# Patient Record
Sex: MALE | Race: WHITE | Employment: OTHER | URBAN - NONMETROPOLITAN AREA
[De-identification: names, ages, dates, MRNs, and addresses within clinical notes are randomized per-mention and may not be internally consistent; named-entity substitution may affect disease eponyms.]

---

## 2017-11-06 ENCOUNTER — OFFICE VISIT (OUTPATIENT)
Dept: PHYSICAL MEDICINE AND REHAB | Age: 77
End: 2017-11-06
Payer: MEDICARE

## 2017-11-06 VITALS — HEART RATE: 76 BPM | SYSTOLIC BLOOD PRESSURE: 112 MMHG | HEIGHT: 66 IN | DIASTOLIC BLOOD PRESSURE: 58 MMHG

## 2017-11-06 DIAGNOSIS — M47.816 SPONDYLOSIS OF LUMBAR REGION WITHOUT MYELOPATHY OR RADICULOPATHY: Primary | ICD-10-CM

## 2017-11-06 DIAGNOSIS — M48.061 SPINAL STENOSIS OF LUMBAR REGION WITHOUT NEUROGENIC CLAUDICATION: ICD-10-CM

## 2017-11-06 PROCEDURE — 99204 OFFICE O/P NEW MOD 45 MIN: CPT | Performed by: NURSE PRACTITIONER

## 2017-11-06 RX ORDER — LANSOPRAZOLE 30 MG/1
30 CAPSULE, DELAYED RELEASE ORAL DAILY
COMMUNITY

## 2017-11-06 RX ORDER — DONEPEZIL HYDROCHLORIDE 10 MG/1
10 TABLET, FILM COATED ORAL NIGHTLY
Status: ON HOLD | COMMUNITY
End: 2020-12-04 | Stop reason: HOSPADM

## 2017-11-06 RX ORDER — ACETAMINOPHEN 325 MG/1
TABLET ORAL DAILY
COMMUNITY
End: 2018-11-30 | Stop reason: DRUGHIGH

## 2017-11-06 RX ORDER — FERROUS SULFATE 325(65) MG
325 TABLET ORAL
COMMUNITY

## 2017-11-06 RX ORDER — TRAMADOL HYDROCHLORIDE 50 MG/1
50 TABLET ORAL EVERY 8 HOURS PRN
Qty: 45 TABLET | Refills: 0 | Status: SHIPPED | OUTPATIENT
Start: 2017-11-06 | End: 2017-11-21

## 2017-11-06 ASSESSMENT — ENCOUNTER SYMPTOMS
SHORTNESS OF BREATH: 0
SINUS PRESSURE: 0
ABDOMINAL PAIN: 0
RHINORRHEA: 0
SORE THROAT: 0
WHEEZING: 0
VOMITING: 0
COUGH: 0
EYE PAIN: 0
DIARRHEA: 0
NAUSEA: 0
COLOR CHANGE: 0
CHEST TIGHTNESS: 0
CONSTIPATION: 0
PHOTOPHOBIA: 0
BACK PAIN: 1

## 2017-11-06 NOTE — LETTER
[address] [place] [facility]  [address] [address] [address]  Phone: [phone]  Fax: Oralieoxb 22, CDY        November 6, 2017     Oscar Hardin DO  [place] [facility] [address] [facility] [address]    Patient: Darrick Todd  MR Number: [id number]  YOB: 1940  Date of Visit: 11/6/2017    Dear Dr. Oscar Hardin: Thank you for the request for consultation for Gretchen Denton to me for the evaluation of low back pain. Below are the relevant portions of my assessment and plan of care. If you have questions, please do not hesitate to call me. I look forward to following Jesu Schwartz along with you.     Sincerely,        Fabiola Inman, CNP

## 2017-11-06 NOTE — PROGRESS NOTES
[facility]  [facility]  [address]  Dept: [phone]  Dept Fax: [phone]  Loc: [phone]    Visit Date: 11/6/2017    Margot Guaman is a 76 y.o. male who is referred for pain management evaluation and treatment per Dr. Candy Valenzuela. CAGE and CAGE-AID Questions   1. In the last three months, have you felt you should cut down or stop drinking or using drugs? Yes []        No [x]     2. In the last three months, has anyone annoyed you or gotten on your nerves by telling you to cut down or stop drinking or using drugs? Yes []        No [x]     3. In the last three months, have you felt guilty or bad about how much you drink or use drugs? Yes []        No [x]     4. In the last three months, have you been waking up wanting to have an alcoholic drink or use drugs? Yes []        No [x]        Opioid Risk Tool:  Clinician Form       1. Family History of Substance Abuse: Female Male    Alcohol   []1   []3    Illegal drugs   []2   []3    Prescription drugs     []4   []4   2. Personal History of Substance Abuse:          Alcohol   []3   []3    Illegal drugs   []4   []4    Prescription drugs     []5   []5   3. Age (masood box if between 12 and 39):     []1   []1   4. History of Preadolescent Sexual Abuse:     []3   []0   5. Psychological Disease:      Attention deficit disorder, obsessive-compulsive disorder, bipolar, schizophrenia   []2   []2      Depression     []1   []1    Scoring Totals  0     Total Score  Low Risk  Moderate Risk  High Risk   Risk Category   0 - 3   4 - 7   8 or Above      Patient states symptoms interfere with:  A.  General Activity:  yes   B. Mood: yes    C. Walking Ability:   yes   D. Normal Work (Includes both work outside the home and housework):   yes    E.  Relations with Other People:  yes   F. Sleep:   yes   G.  Enjoyment of Life:  yes       HPI:     Chief Complaint: low back pain    HPI  Wife present  New patient drugs.    Medications    Current Outpatient Prescriptions:     donepezil (ARICEPT) 10 MG tablet, Take 10 mg by mouth nightly, Disp: , Rfl:     lansoprazole (PREVACID) 30 MG delayed release capsule, Take 30 mg by mouth daily, Disp: , Rfl:     acetaminophen (TYLENOL) 325 MG tablet, Take 650 mg by mouth daily, Disp: , Rfl:     ferrous sulfate 325 (65 Fe) MG tablet, Take 325 mg by mouth daily (with breakfast), Disp: , Rfl:     Cholecalciferol (VITAMIN D3) 5000 units TABS, Take 5,000 Units by mouth daily, Disp: , Rfl:     traMADol (ULTRAM) 50 MG tablet, Take 1 tablet by mouth every 8 hours as needed for Pain, Disp: 45 tablet, Rfl: 0    ramipril (ALTACE) 5 MG capsule, Take 5 mg by mouth daily. , Disp: , Rfl:     metoprolol (TOPROL-XL) 50 MG XL tablet, Take 100 mg by mouth daily , Disp: , Rfl:     fenofibrate (TRICOR) 145 MG tablet, Take 160 mg by mouth daily , Disp: , Rfl:     clopidogrel (PLAVIX) 75 MG tablet, Take 75 mg by mouth daily. , Disp: , Rfl:     isosorbide mononitrate (IMDUR) 30 MG CR tablet, Take 30 mg by mouth daily. , Disp: , Rfl:     aspirin 325 MG tablet, Take 325 mg by mouth daily. , Disp: , Rfl:     DULoxetine (CYMBALTA) 60 MG capsule, Take 60 mg by mouth daily. , Disp: , Rfl:     insulin NPH (HUMULIN N;NOVOLIN N) 100 UNIT/ML injection, Inject  into the skin 2 times daily (before meals). 80 units before breakfast, 66 before supper, Disp: , Rfl:     insulin regular (HUMULIN R;NOVOLIN R) 100 UNIT/ML injection, Inject  into the skin See Admin Instructions. 16 units before breakfast,26 units before supper, Disp: , Rfl:     Levothyroxine Sodium (L-THYROXINE SODIUM), 75 mcg by Does not apply route daily , Disp: , Rfl:     loperamide (IMODIUM) 2 MG capsule, Take 2 mg by mouth as needed. , Disp: , Rfl:     Multiple Vitamins-Minerals (MULTIVITAMIN & MINERAL PO), Take  by mouth daily. , Disp: , Rfl:     polycarbophil (FIBERCON) 625 MG tablet, Take 625 mg by mouth daily. , Disp: , Rfl:     pioglitazone (ACTOS) 15 MG tablet, Take 15 mg by mouth daily. , Disp: , Rfl:     sucralfate (CARAFATE) 1 GM tablet, Take 1 g by mouth 2 times daily , Disp: , Rfl:     Subjective:      Review of Systems   Constitutional: Negative for activity change, appetite change, chills, diaphoresis, fatigue, fever and unexpected weight change. HENT: Negative for congestion, ear pain, hearing loss, mouth sores, nosebleeds, rhinorrhea, sinus pressure and sore throat. Eyes: Negative for photophobia, pain and visual disturbance. Respiratory: Negative for cough, chest tightness, shortness of breath and wheezing. Cardiovascular: Negative for chest pain and palpitations. Gastrointestinal: Negative for abdominal pain, constipation, diarrhea, nausea and vomiting. Endocrine: Negative for cold intolerance, heat intolerance, polydipsia, polyphagia and polyuria. Genitourinary: Negative for decreased urine volume, difficulty urinating, frequency and hematuria. Musculoskeletal: Positive for back pain. Negative for arthralgias, gait problem, joint swelling, myalgias, neck pain and neck stiffness. Skin: Negative for color change and rash. Allergic/Immunologic: Negative for food allergies and immunocompromised state. Neurological: Positive for weakness and numbness. Negative for dizziness, tremors, seizures, syncope, facial asymmetry, speech difficulty, light-headedness and headaches. Hematological: Does not bruise/bleed easily. Psychiatric/Behavioral: Negative for agitation, behavioral problems, confusion, decreased concentration, dysphoric mood, hallucinations, self-injury, sleep disturbance and suicidal ideas. The patient is not nervous/anxious and is not hyperactive. Objective:     Vitals:    11/06/17 1142   BP: (!) 112/58   Site: Left Arm   Position: Sitting   Cuff Size: Large Adult   Height: 5' 6\" (1.676 m)       Physical Exam   Constitutional: He is oriented to person, place, and time.  He appears well-developed and well-nourished. No distress. HENT:   Head: Normocephalic and atraumatic. Right Ear: External ear normal.   Left Ear: External ear normal.   Nose: Nose normal.   Mouth/Throat: Oropharynx is clear and moist. No oropharyngeal exudate. Eyes: Conjunctivae and EOM are normal. Pupils are equal, round, and reactive to light. Right eye exhibits no discharge. Left eye exhibits no discharge. No scleral icterus. Neck: Normal range of motion and full passive range of motion without pain. Neck supple. No muscular tenderness present. No neck rigidity. No edema, no erythema and normal range of motion present. No thyromegaly present. Cardiovascular: Normal rate, regular rhythm, normal heart sounds and intact distal pulses. Exam reveals no gallop and no friction rub. No murmur heard. Pulmonary/Chest: Effort normal and breath sounds normal. No respiratory distress. He has no wheezes. He has no rales. He exhibits no tenderness. Abdominal: Soft. Bowel sounds are normal. He exhibits no distension. There is no tenderness. There is no rebound and no guarding. Musculoskeletal:        Right shoulder: Normal.        Left shoulder: Normal.        Right wrist: Normal.        Left wrist: Normal.        Right hip: Normal.        Left hip: Normal.        Right knee: Normal.        Left knee: Normal.        Right ankle: Normal.        Left ankle: Normal.        Cervical back: Normal.        Thoracic back: He exhibits tenderness. Lumbar back: He exhibits tenderness and pain. Back:    Neurological: He is alert and oriented to person, place, and time. He has normal strength and normal reflexes. He is not disoriented. He displays no atrophy. No cranial nerve deficit or sensory deficit. He exhibits normal muscle tone. He displays a negative Romberg sign. Coordination and gait normal. He displays no Babinski's sign on the right side. SLR-  Motor 5/5 BUE BLE   Skin: Skin is warm. No rash noted. He is not diaphoretic.  No erythema. No pallor. Psychiatric: He has a normal mood and affect. His speech is normal and behavior is normal. Judgment and thought content normal. His mood appears not anxious. His affect is not angry, not blunt, not labile and not inappropriate. He is not agitated, not aggressive, not hyperactive, not slowed, not withdrawn, not actively hallucinating and not combative. Thought content is not paranoid and not delusional. Cognition and memory are normal. Cognition and memory are not impaired. He does not express impulsivity or inappropriate judgment. He does not exhibit a depressed mood. He expresses no homicidal and no suicidal ideation. He expresses no suicidal plans and no homicidal plans. He exhibits normal recent memory and normal remote memory. He is attentive. Nursing note and vitals reviewed. Assessment:     1. Spondylosis of lumbar region without myelopathy or radiculopathy    2. Spinal stenosis of lumbar region without neurogenic claudication            Plan:      · Patient read and signed orientation and opioid agreement. · OARRS reviewed. · Discussed long term side effects of medications. · Discussed tolerance, dependency and addiction. .UDS preformed today. · Patient told can not receive any pain medications from any other source. · Discussed with pt.may not be pain free. · No evidence of abuse, diversion or aberrant behavior. · Medications and/or procedures improve function and quality of life. · Reviewed PCP notes  · Reviewed Lumbar MRI  · Plan Lumbar Facet MBB #1 @ L3-4,4-5,5-S1 Bilateral, risk and procedure reviewed. Patient is on Aspirin and Plavix  · Trial Tramadol 50 mg PRN q 8 hrs, medication reviewed    Previous Treatments tried:  · PT: Yes,  any benefit? No, how many weeks? 6-8, last date done: 2016  · NSAIDs: Yes,  any benefit? no,  how long taken: months GI BLEED history  · Chiropractic: Yes,  any benefit? No and pain worse  · Muscle relaxants: No,  any benefit?

## 2017-11-27 ENCOUNTER — ANESTHESIA (OUTPATIENT)
Dept: OPERATING ROOM | Age: 77
End: 2017-11-27
Payer: MEDICARE

## 2017-11-27 ENCOUNTER — ANESTHESIA EVENT (OUTPATIENT)
Dept: OPERATING ROOM | Age: 77
End: 2017-11-27
Payer: MEDICARE

## 2017-11-27 ENCOUNTER — HOSPITAL ENCOUNTER (OUTPATIENT)
Age: 77
Setting detail: OUTPATIENT SURGERY
Discharge: HOME | End: 2017-11-27
Attending: PAIN MEDICINE | Admitting: PAIN MEDICINE
Payer: MEDICARE

## 2017-11-27 ENCOUNTER — APPOINTMENT (OUTPATIENT)
Dept: GENERAL RADIOLOGY | Age: 77
End: 2017-11-27
Attending: PAIN MEDICINE
Payer: MEDICARE

## 2017-11-27 VITALS
TEMPERATURE: 97.4 F | OXYGEN SATURATION: 92 % | DIASTOLIC BLOOD PRESSURE: 63 MMHG | WEIGHT: 216 LBS | RESPIRATION RATE: 16 BRPM | HEART RATE: 85 BPM | BODY MASS INDEX: 33.9 KG/M2 | SYSTOLIC BLOOD PRESSURE: 108 MMHG | HEIGHT: 67 IN

## 2017-11-27 VITALS — OXYGEN SATURATION: 93 % | SYSTOLIC BLOOD PRESSURE: 96 MMHG | DIASTOLIC BLOOD PRESSURE: 53 MMHG

## 2017-11-27 LAB — GLUCOSE BLD-MCNC: 146 MG/DL (ref 70–108)

## 2017-11-27 PROCEDURE — 2500000003 HC RX 250 WO HCPCS: Performed by: PAIN MEDICINE

## 2017-11-27 PROCEDURE — 6360000002 HC RX W HCPCS: Performed by: NURSE ANESTHETIST, CERTIFIED REGISTERED

## 2017-11-27 PROCEDURE — 3700000001 HC ADD 15 MINUTES (ANESTHESIA): Performed by: PAIN MEDICINE

## 2017-11-27 PROCEDURE — 64493 INJ PARAVERT F JNT L/S 1 LEV: CPT | Performed by: PAIN MEDICINE

## 2017-11-27 PROCEDURE — A6402 STERILE GAUZE <= 16 SQ IN: HCPCS | Performed by: PAIN MEDICINE

## 2017-11-27 PROCEDURE — 6360000002 HC RX W HCPCS: Performed by: PAIN MEDICINE

## 2017-11-27 PROCEDURE — 3600000058 HC PAIN LEVEL 5 BASE: Performed by: PAIN MEDICINE

## 2017-11-27 PROCEDURE — 3600000059 HC PAIN LEVEL 5 ADDL 15 MIN: Performed by: PAIN MEDICINE

## 2017-11-27 PROCEDURE — 64494 INJ PARAVERT F JNT L/S 2 LEV: CPT | Performed by: PAIN MEDICINE

## 2017-11-27 PROCEDURE — 3209999900 FLUORO FOR SURGICAL PROCEDURES

## 2017-11-27 PROCEDURE — 7100000010 HC PHASE II RECOVERY - FIRST 15 MIN: Performed by: PAIN MEDICINE

## 2017-11-27 PROCEDURE — 2500000003 HC RX 250 WO HCPCS: Performed by: NURSE ANESTHETIST, CERTIFIED REGISTERED

## 2017-11-27 PROCEDURE — 3700000000 HC ANESTHESIA ATTENDED CARE: Performed by: PAIN MEDICINE

## 2017-11-27 PROCEDURE — 64495 INJ PARAVERT F JNT L/S 3 LEV: CPT | Performed by: PAIN MEDICINE

## 2017-11-27 PROCEDURE — 82948 REAGENT STRIP/BLOOD GLUCOSE: CPT

## 2017-11-27 PROCEDURE — 7100000011 HC PHASE II RECOVERY - ADDTL 15 MIN: Performed by: PAIN MEDICINE

## 2017-11-27 RX ORDER — LIDOCAINE HYDROCHLORIDE 10 MG/ML
INJECTION, SOLUTION INFILTRATION; PERINEURAL PRN
Status: DISCONTINUED | OUTPATIENT
Start: 2017-11-27 | End: 2017-11-27 | Stop reason: HOSPADM

## 2017-11-27 RX ORDER — LIDOCAINE HYDROCHLORIDE 10 MG/ML
INJECTION, SOLUTION INFILTRATION; PERINEURAL PRN
Status: DISCONTINUED | OUTPATIENT
Start: 2017-11-27 | End: 2017-11-27 | Stop reason: SDUPTHER

## 2017-11-27 RX ORDER — PROPOFOL 10 MG/ML
INJECTION, EMULSION INTRAVENOUS PRN
Status: DISCONTINUED | OUTPATIENT
Start: 2017-11-27 | End: 2017-11-27 | Stop reason: SDUPTHER

## 2017-11-27 RX ORDER — BUPIVACAINE HYDROCHLORIDE 2.5 MG/ML
INJECTION, SOLUTION EPIDURAL; INFILTRATION; INTRACAUDAL PRN
Status: DISCONTINUED | OUTPATIENT
Start: 2017-11-27 | End: 2017-11-27 | Stop reason: HOSPADM

## 2017-11-27 RX ORDER — BETAMETHASONE SODIUM PHOSPHATE AND BETAMETHASONE ACETATE 3; 3 MG/ML; MG/ML
INJECTION, SUSPENSION INTRA-ARTICULAR; INTRALESIONAL; INTRAMUSCULAR; SOFT TISSUE PRN
Status: DISCONTINUED | OUTPATIENT
Start: 2017-11-27 | End: 2017-11-27 | Stop reason: HOSPADM

## 2017-11-27 RX ORDER — TRAMADOL HYDROCHLORIDE 50 MG/1
50 TABLET ORAL EVERY 8 HOURS PRN
Qty: 90 TABLET | Refills: 0 | Status: SHIPPED | OUTPATIENT
Start: 2017-11-27 | End: 2017-12-27

## 2017-11-27 RX ADMIN — LIDOCAINE HYDROCHLORIDE 50 MG: 10 INJECTION, SOLUTION INFILTRATION; PERINEURAL at 07:15

## 2017-11-27 RX ADMIN — PROPOFOL 50 MG: 10 INJECTION, EMULSION INTRAVENOUS at 07:16

## 2017-11-27 ASSESSMENT — PULMONARY FUNCTION TESTS
PIF_VALUE: 0
PIF_VALUE: 1
PIF_VALUE: 0

## 2017-11-27 ASSESSMENT — PAIN - FUNCTIONAL ASSESSMENT: PAIN_FUNCTIONAL_ASSESSMENT: 0-10

## 2017-11-27 ASSESSMENT — PAIN DESCRIPTION - ORIENTATION: ORIENTATION: LOWER

## 2017-11-27 ASSESSMENT — PAIN DESCRIPTION - LOCATION: LOCATION: BACK

## 2017-11-27 ASSESSMENT — PAIN SCALES - GENERAL: PAINLEVEL_OUTOF10: 7

## 2017-11-27 ASSESSMENT — LIFESTYLE VARIABLES: SMOKING_STATUS: 1

## 2017-11-27 ASSESSMENT — PAIN DESCRIPTION - FREQUENCY: FREQUENCY: CONTINUOUS

## 2017-11-27 ASSESSMENT — PAIN DESCRIPTION - PAIN TYPE: TYPE: CHRONIC PAIN

## 2017-11-27 ASSESSMENT — PAIN DESCRIPTION - DESCRIPTORS
DESCRIPTORS: CONSTANT;ACHING
DESCRIPTORS: ACHING;CONSTANT

## 2017-11-27 NOTE — OP NOTE
Pre-Procedure Note    Patient Name: Luke Mathew   YOB: 1940  Room/Bed: [address] [place] [address] [facility]  Medical Record Number: [id number]  Date: 11/21/2017      Indication:  Lower back pain  Consent: On file. Vital Signs:   Vitals:    11/27/17 0628   BP: 125/60   Pulse: 91   Resp: 16   Temp: 97.6 °F (36.4 °C)   SpO2: 93%       Pre-Sedation Documentation and Exam:   Vital signs have been reviewed (see flow sheet for vitals). Sedation/ Anesthesia Plan:   MAC    Patient is an appropriate candidate for plan of sedation: yes    Preoperative Diagnosis:  L-spondylosis  Postoperative Diagnosis:  As above    Procedure Performed:  Diagnostic/Confirmatory median branch blocks at the levels of L3-4,L4-5 anfd L5-S1 bilateral under fluoroscopic guidance #1. Indication for the Procedure: The patient has a history of chronic low back pain that is not responding well to the conservative treatment. The patient's pain is mostly axial in nature. Pain is interfering with the activities of daily living. Physical examination revealed facet tenderness and facet loading is positive. The procedure and risks  were discussed with the patient and an informed consent was obtained. Procedure: Bilateral    Patient is placed in prone position and skin over  the back was prepped and draped in sterile manner. Then using fluoroscopy the junction of the transverse process of the vertebra with the superior process of the facet joint was observed and the view was optimized. The skin and deep tissues posterior were infiltrated with 12 ml of 1% lidocaine. Then a #22-gauge 5-1/2 inch spinal needle was introduced through the skin wheal under fluoroscopy guidance such that the tip of the needle lies at the junction of the transverse process with the superior processes of the facet joint. . Then after negative aspiration a total of 10 ml of 0.25% Marcaine and Celestone (total 12 mg) was injected through the needles.   This was done at the levels of L3-4,L4-5 and L5-S1 bilateral.    For L5 Median branch block the junction of the ala of  the sacrum with the superior articular process of the facet joint was taken as a reference point and L4 median branch the junction of the transverse process the L5 with the superolateral possible facet joint was taken to the point and healthy median branch the junction of the transverse process of L4 with the superior lateral process of the facet joint was taken as a reference point. For S1 median branch the most lateral and superior aspect of S1 foramina was taken as a reference point. Patient's vital signs and neurological status remained stable through  the procedure and post procedural  Period. Patient was instructed to keep track of pain for next 24 hours. every hour and bring it with next visit. Patient tollerated the procedure well and was discharged home in stable condition.

## 2017-11-27 NOTE — H&P
[address] [address]  History and Physical Update    Pt Name: Gerard Gregory  MRN: [id number]  YOB: 1940  Date of evaluation: 11/21/2017    I have examined the patient and reviewed the H&P/Consult and there are no changes to the patient or plans.         Electronically signed by Lesia Lepe MD on 11/27/2017 at 7:36 AM

## 2017-11-27 NOTE — ANESTHESIA PRE PROCEDURE
Department of Anesthesiology  Preprocedure Note       Name:  Mary Anne Gilliland   Age:  76 y.o.  :  1940                                          MRN:  [id number]         Date:  2017      Surgeon: Zohaib Witt):  Blanca White MD    Procedure: Procedure(s):  LUMBAR FACET MBB   L3-4, L4-5, L5-S1 BILATERAL    Medications prior to admission:   Prior to Admission medications    Medication Sig Start Date End Date Taking? Authorizing Provider   donepezil (ARICEPT) 10 MG tablet Take 10 mg by mouth nightly   Yes Historical Provider, MD   lansoprazole (PREVACID) 30 MG delayed release capsule Take 30 mg by mouth daily   Yes Historical Provider, MD   acetaminophen (TYLENOL) 325 MG tablet Take 650 mg by mouth daily   Yes Historical Provider, MD   ferrous sulfate 325 (65 Fe) MG tablet Take 325 mg by mouth daily (with breakfast)   Yes Historical Provider, MD   Cholecalciferol (VITAMIN D3) 5000 units TABS Take 5,000 Units by mouth daily   Yes Historical Provider, MD   ramipril (ALTACE) 5 MG capsule Take 5 mg by mouth daily. Yes Historical Provider, MD   metoprolol (TOPROL-XL) 50 MG XL tablet Take 100 mg by mouth daily    Yes Historical Provider, MD   fenofibrate (TRICOR) 145 MG tablet Take 160 mg by mouth daily    Yes Historical Provider, MD   clopidogrel (PLAVIX) 75 MG tablet Take 75 mg by mouth daily. Yes Historical Provider, MD   isosorbide mononitrate (IMDUR) 30 MG CR tablet Take 30 mg by mouth daily. Yes Historical Provider, MD   aspirin 325 MG tablet Take 325 mg by mouth daily. Yes Historical Provider, MD   DULoxetine (CYMBALTA) 60 MG capsule Take 60 mg by mouth daily. Yes Historical Provider, MD   insulin NPH (HUMULIN N;NOVOLIN N) 100 UNIT/ML injection Inject  into the skin 2 times daily (before meals).  80 units before breakfast, 66 before supper   Yes Historical Provider, MD   insulin regular (HUMULIN R;NOVOLIN R) 100 UNIT/ML injection Inject 10 Units into the skin See Admin Instructions 16 units dentures      Pulmonary:   (+) current smoker          Patient did not smoke on day of surgery. Cardiovascular:    (+) CAD: non-obstructive,     (-) dysrhythmias,  angina and  CHF    ECG reviewed               Beta Blocker:  Dose within 24 Hrs         Neuro/Psych:   Negative Neuro/Psych ROS              GI/Hepatic/Renal:   (+) GERD: well controlled,           Endo/Other:    (+) Type II DM, well controlled, using insulin, . Abdominal:           Vascular:                                        Anesthesia Plan      MAC     ASA 3       Induction: intravenous. Anesthetic plan and risks discussed with patient and spouse. Plan discussed with CRNA. Efren Davies MD   11/27/2017

## 2018-01-04 ENCOUNTER — OFFICE VISIT (OUTPATIENT)
Dept: PHYSICAL MEDICINE AND REHAB | Age: 78
End: 2018-01-04
Payer: MEDICARE

## 2018-01-04 VITALS
WEIGHT: 216.05 LBS | SYSTOLIC BLOOD PRESSURE: 131 MMHG | HEART RATE: 71 BPM | DIASTOLIC BLOOD PRESSURE: 66 MMHG | BODY MASS INDEX: 33.91 KG/M2 | HEIGHT: 67 IN

## 2018-01-04 DIAGNOSIS — M47.816 SPONDYLOSIS OF LUMBAR REGION WITHOUT MYELOPATHY OR RADICULOPATHY: Primary | ICD-10-CM

## 2018-01-04 DIAGNOSIS — G89.4 CHRONIC PAIN SYNDROME: ICD-10-CM

## 2018-01-04 DIAGNOSIS — M48.061 SPINAL STENOSIS OF LUMBAR REGION WITHOUT NEUROGENIC CLAUDICATION: ICD-10-CM

## 2018-01-04 PROCEDURE — 99213 OFFICE O/P EST LOW 20 MIN: CPT | Performed by: NURSE PRACTITIONER

## 2018-01-04 RX ORDER — TRAMADOL HYDROCHLORIDE 50 MG/1
50 TABLET ORAL EVERY 8 HOURS PRN
Qty: 90 TABLET | Refills: 0 | Status: SHIPPED | OUTPATIENT
Start: 2018-01-04 | End: 2018-02-12 | Stop reason: SDUPTHER

## 2018-01-04 ASSESSMENT — ENCOUNTER SYMPTOMS
VOMITING: 0
BACK PAIN: 1
SORE THROAT: 0
NAUSEA: 0
RHINORRHEA: 0
COLOR CHANGE: 0
COUGH: 0
SHORTNESS OF BREATH: 0
CONSTIPATION: 0
CHEST TIGHTNESS: 0
DIARRHEA: 0
WHEEZING: 0
PHOTOPHOBIA: 0
EYE PAIN: 0
ABDOMINAL PAIN: 0
SINUS PRESSURE: 0

## 2018-01-30 RX ORDER — AMLODIPINE BESYLATE 2.5 MG/1
2.5 TABLET ORAL DAILY
Status: ON HOLD | COMMUNITY
End: 2020-12-04 | Stop reason: HOSPADM

## 2018-02-06 ENCOUNTER — HOSPITAL ENCOUNTER (OUTPATIENT)
Age: 78
Setting detail: OUTPATIENT SURGERY
Discharge: HOME | End: 2018-02-06
Attending: PAIN MEDICINE | Admitting: PAIN MEDICINE
Payer: MEDICARE

## 2018-02-06 ENCOUNTER — ANESTHESIA (OUTPATIENT)
Dept: OPERATING ROOM | Age: 78
End: 2018-02-06
Payer: MEDICARE

## 2018-02-06 ENCOUNTER — APPOINTMENT (OUTPATIENT)
Dept: GENERAL RADIOLOGY | Age: 78
End: 2018-02-06
Attending: PAIN MEDICINE
Payer: MEDICARE

## 2018-02-06 ENCOUNTER — ANESTHESIA EVENT (OUTPATIENT)
Dept: OPERATING ROOM | Age: 78
End: 2018-02-06
Payer: MEDICARE

## 2018-02-06 VITALS — OXYGEN SATURATION: 94 % | DIASTOLIC BLOOD PRESSURE: 57 MMHG | SYSTOLIC BLOOD PRESSURE: 95 MMHG

## 2018-02-06 VITALS
OXYGEN SATURATION: 98 % | BODY MASS INDEX: 32.37 KG/M2 | DIASTOLIC BLOOD PRESSURE: 39 MMHG | HEIGHT: 68 IN | TEMPERATURE: 97.9 F | SYSTOLIC BLOOD PRESSURE: 88 MMHG | WEIGHT: 213.6 LBS | HEART RATE: 73 BPM | RESPIRATION RATE: 16 BRPM

## 2018-02-06 LAB — GLUCOSE BLD-MCNC: 175 MG/DL (ref 70–108)

## 2018-02-06 PROCEDURE — 6360000002 HC RX W HCPCS: Performed by: PAIN MEDICINE

## 2018-02-06 PROCEDURE — 82948 REAGENT STRIP/BLOOD GLUCOSE: CPT

## 2018-02-06 PROCEDURE — 3700000001 HC ADD 15 MINUTES (ANESTHESIA): Performed by: PAIN MEDICINE

## 2018-02-06 PROCEDURE — 3700000000 HC ANESTHESIA ATTENDED CARE: Performed by: PAIN MEDICINE

## 2018-02-06 PROCEDURE — 7100000011 HC PHASE II RECOVERY - ADDTL 15 MIN: Performed by: PAIN MEDICINE

## 2018-02-06 PROCEDURE — 3600000058 HC PAIN LEVEL 5 BASE: Performed by: PAIN MEDICINE

## 2018-02-06 PROCEDURE — 6360000002 HC RX W HCPCS: Performed by: NURSE ANESTHETIST, CERTIFIED REGISTERED

## 2018-02-06 PROCEDURE — 64495 INJ PARAVERT F JNT L/S 3 LEV: CPT | Performed by: PAIN MEDICINE

## 2018-02-06 PROCEDURE — 2500000003 HC RX 250 WO HCPCS: Performed by: PAIN MEDICINE

## 2018-02-06 PROCEDURE — 3209999900 FLUORO FOR SURGICAL PROCEDURES

## 2018-02-06 PROCEDURE — 64493 INJ PARAVERT F JNT L/S 1 LEV: CPT | Performed by: PAIN MEDICINE

## 2018-02-06 PROCEDURE — 64494 INJ PARAVERT F JNT L/S 2 LEV: CPT | Performed by: PAIN MEDICINE

## 2018-02-06 PROCEDURE — 7100000010 HC PHASE II RECOVERY - FIRST 15 MIN: Performed by: PAIN MEDICINE

## 2018-02-06 PROCEDURE — 3600000059 HC PAIN LEVEL 5 ADDL 15 MIN: Performed by: PAIN MEDICINE

## 2018-02-06 RX ORDER — BUPIVACAINE HYDROCHLORIDE 2.5 MG/ML
INJECTION, SOLUTION EPIDURAL; INFILTRATION; INTRACAUDAL PRN
Status: DISCONTINUED | OUTPATIENT
Start: 2018-02-06 | End: 2018-02-06 | Stop reason: HOSPADM

## 2018-02-06 RX ORDER — BETAMETHASONE SODIUM PHOSPHATE AND BETAMETHASONE ACETATE 3; 3 MG/ML; MG/ML
INJECTION, SUSPENSION INTRA-ARTICULAR; INTRALESIONAL; INTRAMUSCULAR; SOFT TISSUE PRN
Status: DISCONTINUED | OUTPATIENT
Start: 2018-02-06 | End: 2018-02-06 | Stop reason: HOSPADM

## 2018-02-06 RX ORDER — PROPOFOL 10 MG/ML
INJECTION, EMULSION INTRAVENOUS PRN
Status: DISCONTINUED | OUTPATIENT
Start: 2018-02-06 | End: 2018-02-06 | Stop reason: SDUPTHER

## 2018-02-06 RX ORDER — LIDOCAINE HYDROCHLORIDE 10 MG/ML
INJECTION, SOLUTION INFILTRATION; PERINEURAL PRN
Status: DISCONTINUED | OUTPATIENT
Start: 2018-02-06 | End: 2018-02-06 | Stop reason: HOSPADM

## 2018-02-06 RX ADMIN — PROPOFOL 20 MG: 10 INJECTION, EMULSION INTRAVENOUS at 12:18

## 2018-02-06 RX ADMIN — PROPOFOL 40 MG: 10 INJECTION, EMULSION INTRAVENOUS at 12:23

## 2018-02-06 RX ADMIN — PROPOFOL 50 MG: 10 INJECTION, EMULSION INTRAVENOUS at 12:15

## 2018-02-06 ASSESSMENT — PULMONARY FUNCTION TESTS
PIF_VALUE: 0

## 2018-02-06 ASSESSMENT — PAIN DESCRIPTION - DESCRIPTORS: DESCRIPTORS: SHARP

## 2018-02-06 ASSESSMENT — PAIN - FUNCTIONAL ASSESSMENT: PAIN_FUNCTIONAL_ASSESSMENT: 0-10

## 2018-02-06 ASSESSMENT — PAIN SCALES - GENERAL: PAINLEVEL_OUTOF10: 2

## 2018-02-06 NOTE — OP NOTE
the levels of L3-4,L4-5 and L5-S1 bilateral.    For L5 Median branch block the junction of the ala of  the sacrum with the superior articular process of the facet joint was taken as a reference point and L4 median branch the junction of the transverse process the L5 with the superolateral possible facet joint was taken to the point and healthy median branch the junction of the transverse process of L4 with the superior lateral process of the facet joint was taken as a reference point. For S1 median branch the most lateral and superior aspect of S1 foramina was taken as a reference point. Patient's vital signs and neurological status remained stable through  the procedure and post procedural  Period. Patient was instructed to keep track of pain for next 24 hours. every hour and bring it with next visit. Patient tollerated the procedure well and was discharged home in stable condition.

## 2018-02-12 DIAGNOSIS — G89.4 CHRONIC PAIN SYNDROME: ICD-10-CM

## 2018-02-12 DIAGNOSIS — M48.061 SPINAL STENOSIS OF LUMBAR REGION WITHOUT NEUROGENIC CLAUDICATION: ICD-10-CM

## 2018-02-12 DIAGNOSIS — M47.816 SPONDYLOSIS OF LUMBAR REGION WITHOUT MYELOPATHY OR RADICULOPATHY: ICD-10-CM

## 2018-02-12 RX ORDER — TRAMADOL HYDROCHLORIDE 50 MG/1
50 TABLET ORAL EVERY 8 HOURS PRN
Qty: 90 TABLET | Refills: 0 | Status: SHIPPED | OUTPATIENT
Start: 2018-02-12 | End: 2018-03-14

## 2018-02-20 ENCOUNTER — OFFICE VISIT (OUTPATIENT)
Dept: PHYSICAL MEDICINE AND REHAB | Age: 78
End: 2018-02-20
Payer: MEDICARE

## 2018-02-20 VITALS
SYSTOLIC BLOOD PRESSURE: 103 MMHG | HEART RATE: 72 BPM | WEIGHT: 219 LBS | HEIGHT: 68 IN | DIASTOLIC BLOOD PRESSURE: 51 MMHG | BODY MASS INDEX: 33.19 KG/M2

## 2018-02-20 DIAGNOSIS — M47.816 SPONDYLOSIS OF LUMBAR REGION WITHOUT MYELOPATHY OR RADICULOPATHY: Primary | ICD-10-CM

## 2018-02-20 DIAGNOSIS — G89.4 CHRONIC PAIN SYNDROME: ICD-10-CM

## 2018-02-20 DIAGNOSIS — M48.061 SPINAL STENOSIS OF LUMBAR REGION WITHOUT NEUROGENIC CLAUDICATION: ICD-10-CM

## 2018-02-20 PROCEDURE — 99213 OFFICE O/P EST LOW 20 MIN: CPT | Performed by: NURSE PRACTITIONER

## 2018-02-20 ASSESSMENT — ENCOUNTER SYMPTOMS
CONSTIPATION: 0
SINUS PRESSURE: 0
DIARRHEA: 0
NAUSEA: 0
COUGH: 0
ABDOMINAL PAIN: 0
WHEEZING: 0
CHEST TIGHTNESS: 0
BACK PAIN: 1
VOMITING: 0
SORE THROAT: 0
PHOTOPHOBIA: 0
COLOR CHANGE: 0
SHORTNESS OF BREATH: 0
EYE PAIN: 0
RHINORRHEA: 0

## 2018-02-20 NOTE — PROGRESS NOTES
reflux disease); and Hyperlipidemia. Past Surgical History  The patient  has a past surgical history that includes Cholecystectomy; Carpal tunnel release (Right, 1970); Tooth Extraction (1970); lumbar laminectomy (44/5515); Rotator cuff repair (Left, 1998); Retinopathy surgery (Bilateral, 1997); debridement (Left, 12/2003); Cataract removal (Bilateral, 09/2005); Cardiac catheterization (12/2005); cardiovascular stress test (08/2015); Colonoscopy; Upper gastrointestinal endoscopy (2012); Nerve Block Lumb Facet Level 1 Bilateral (Bilateral, 11/27/2017); Nerve Surgery (Bilateral, 02/06/2018); and inj dx/ther agnt paravert facet joint, lumbar/sac, 1st level (Bilateral, 2/6/2018). Family History  This patient's family history is not on file. Social History  Yuliya Moscoso  reports that he has been smoking Pipe and Cigars. He has never used smokeless tobacco. He reports that he does not drink alcohol or use drugs. Medications    Current Outpatient Prescriptions:     traMADol (ULTRAM) 50 MG tablet, Take 1 tablet by mouth every 8 hours as needed for Pain for up to 30 days. , Disp: 90 tablet, Rfl: 0    amLODIPine (NORVASC) 2.5 MG tablet, Take 2.5 mg by mouth daily, Disp: , Rfl:     donepezil (ARICEPT) 10 MG tablet, Take 10 mg by mouth nightly, Disp: , Rfl:     lansoprazole (PREVACID) 30 MG delayed release capsule, Take 30 mg by mouth daily, Disp: , Rfl:     acetaminophen (TYLENOL) 325 MG tablet, Take 650 mg by mouth daily, Disp: , Rfl:     ferrous sulfate 325 (65 Fe) MG tablet, Take 325 mg by mouth daily (with breakfast), Disp: , Rfl:     Cholecalciferol (VITAMIN D3) 5000 units TABS, Take 5,000 Units by mouth daily, Disp: , Rfl:     ramipril (ALTACE) 5 MG capsule, Take 5 mg by mouth daily. , Disp: , Rfl:     metoprolol (TOPROL-XL) 50 MG XL tablet, Take 100 mg by mouth daily , Disp: , Rfl:     fenofibrate (TRICOR) 145 MG tablet, Take 160 mg by mouth daily , Disp: , Rfl:     clopidogrel (PLAVIX) 75 MG tablet, Take radiculopathy    2. Spinal stenosis of lumbar region without neurogenic claudication    3. Chronic pain syndrome            Plan:      · OARRS reviewed. · Discussed long term side effects of medications. · Discussed tolerance, dependency and addiction. · Previous UDS reviewed. · UDS preformed today for compliance. · Patient told can not receive any pain medications from any other source. · Discussed with pt.may not be pain free. · No evidence of abuse, diversion or aberrant behavior. · Medications and/or procedures improve function and quality of life. .  · Plan Lumbar RFA Bilateral RIGHT SIDE FIRST @ X5-5,2-5,8-Y0, risk and procedure reviewed  · Continue Tramadol and Cymbalta, patient has been stable and compliant    Meds. Prescribed:   No orders of the defined types were placed in this encounter. Return for Lumbar RFA Bilateral RIGHT SIDE FIRST @ K6-1,5-7,1-Q3, Follow up after procedure, Follow up pain med.          Electronically signed by Pradeep Henry CNP on 2/20/2018 at 2:47 PM

## 2018-03-05 ENCOUNTER — ANESTHESIA EVENT (OUTPATIENT)
Dept: OPERATING ROOM | Age: 78
End: 2018-03-05
Payer: MEDICARE

## 2018-03-05 ENCOUNTER — APPOINTMENT (OUTPATIENT)
Dept: GENERAL RADIOLOGY | Age: 78
End: 2018-03-05
Attending: PAIN MEDICINE
Payer: MEDICARE

## 2018-03-05 ENCOUNTER — ANESTHESIA (OUTPATIENT)
Dept: OPERATING ROOM | Age: 78
End: 2018-03-05
Payer: MEDICARE

## 2018-03-05 ENCOUNTER — HOSPITAL ENCOUNTER (OUTPATIENT)
Age: 78
Setting detail: OUTPATIENT SURGERY
Discharge: HOME | End: 2018-03-05
Attending: PAIN MEDICINE | Admitting: PAIN MEDICINE
Payer: MEDICARE

## 2018-03-05 VITALS
SYSTOLIC BLOOD PRESSURE: 89 MMHG | BODY MASS INDEX: 32.16 KG/M2 | WEIGHT: 212.2 LBS | HEART RATE: 68 BPM | HEIGHT: 68 IN | TEMPERATURE: 97 F | RESPIRATION RATE: 14 BRPM | DIASTOLIC BLOOD PRESSURE: 51 MMHG | OXYGEN SATURATION: 94 %

## 2018-03-05 VITALS — SYSTOLIC BLOOD PRESSURE: 119 MMHG | OXYGEN SATURATION: 95 % | DIASTOLIC BLOOD PRESSURE: 61 MMHG

## 2018-03-05 LAB — GLUCOSE BLD-MCNC: 150 MG/DL (ref 70–108)

## 2018-03-05 PROCEDURE — 3209999900 FLUORO FOR SURGICAL PROCEDURES

## 2018-03-05 PROCEDURE — 2500000003 HC RX 250 WO HCPCS: Performed by: PAIN MEDICINE

## 2018-03-05 PROCEDURE — 64636 DESTROY L/S FACET JNT ADDL: CPT | Performed by: PAIN MEDICINE

## 2018-03-05 PROCEDURE — 6360000002 HC RX W HCPCS: Performed by: PAIN MEDICINE

## 2018-03-05 PROCEDURE — 64635 DESTROY LUMB/SAC FACET JNT: CPT | Performed by: PAIN MEDICINE

## 2018-03-05 PROCEDURE — 6360000002 HC RX W HCPCS: Performed by: NURSE ANESTHETIST, CERTIFIED REGISTERED

## 2018-03-05 PROCEDURE — 3700000000 HC ANESTHESIA ATTENDED CARE: Performed by: PAIN MEDICINE

## 2018-03-05 PROCEDURE — 3700000001 HC ADD 15 MINUTES (ANESTHESIA): Performed by: PAIN MEDICINE

## 2018-03-05 PROCEDURE — 3600000057 HC PAIN LEVEL 4 ADDL 15 MIN: Performed by: PAIN MEDICINE

## 2018-03-05 PROCEDURE — 3600000056 HC PAIN LEVEL 4 BASE: Performed by: PAIN MEDICINE

## 2018-03-05 PROCEDURE — 7100000011 HC PHASE II RECOVERY - ADDTL 15 MIN: Performed by: PAIN MEDICINE

## 2018-03-05 PROCEDURE — 82948 REAGENT STRIP/BLOOD GLUCOSE: CPT

## 2018-03-05 PROCEDURE — 7100000010 HC PHASE II RECOVERY - FIRST 15 MIN: Performed by: PAIN MEDICINE

## 2018-03-05 RX ORDER — FENTANYL CITRATE 50 UG/ML
INJECTION, SOLUTION INTRAMUSCULAR; INTRAVENOUS PRN
Status: DISCONTINUED | OUTPATIENT
Start: 2018-03-05 | End: 2018-03-05 | Stop reason: SDUPTHER

## 2018-03-05 RX ORDER — LIDOCAINE HYDROCHLORIDE 10 MG/ML
INJECTION, SOLUTION EPIDURAL; INFILTRATION; INTRACAUDAL; PERINEURAL PRN
Status: DISCONTINUED | OUTPATIENT
Start: 2018-03-05 | End: 2018-03-05 | Stop reason: HOSPADM

## 2018-03-05 RX ORDER — BETAMETHASONE SODIUM PHOSPHATE AND BETAMETHASONE ACETATE 3; 3 MG/ML; MG/ML
INJECTION, SUSPENSION INTRA-ARTICULAR; INTRALESIONAL; INTRAMUSCULAR; SOFT TISSUE PRN
Status: DISCONTINUED | OUTPATIENT
Start: 2018-03-05 | End: 2018-03-05 | Stop reason: HOSPADM

## 2018-03-05 RX ORDER — BUPIVACAINE HYDROCHLORIDE 2.5 MG/ML
INJECTION, SOLUTION EPIDURAL; INFILTRATION; INTRACAUDAL PRN
Status: DISCONTINUED | OUTPATIENT
Start: 2018-03-05 | End: 2018-03-05 | Stop reason: HOSPADM

## 2018-03-05 RX ORDER — PROPOFOL 10 MG/ML
INJECTION, EMULSION INTRAVENOUS PRN
Status: DISCONTINUED | OUTPATIENT
Start: 2018-03-05 | End: 2018-03-05 | Stop reason: SDUPTHER

## 2018-03-05 RX ADMIN — FENTANYL CITRATE 50 MCG: 50 INJECTION INTRAMUSCULAR; INTRAVENOUS at 12:24

## 2018-03-05 RX ADMIN — PROPOFOL 35 MG: 10 INJECTION, EMULSION INTRAVENOUS at 12:32

## 2018-03-05 RX ADMIN — PROPOFOL 15 MG: 10 INJECTION, EMULSION INTRAVENOUS at 12:34

## 2018-03-05 RX ADMIN — FENTANYL CITRATE 50 MCG: 50 INJECTION INTRAMUSCULAR; INTRAVENOUS at 12:23

## 2018-03-05 ASSESSMENT — PULMONARY FUNCTION TESTS
PIF_VALUE: 0
PIF_VALUE: 1
PIF_VALUE: 0

## 2018-03-05 ASSESSMENT — PAIN - FUNCTIONAL ASSESSMENT: PAIN_FUNCTIONAL_ASSESSMENT: 0-10

## 2018-03-05 ASSESSMENT — PAIN DESCRIPTION - DESCRIPTORS: DESCRIPTORS: CONSTANT

## 2018-03-05 ASSESSMENT — PAIN SCALES - GENERAL: PAINLEVEL_OUTOF10: 4

## 2018-03-05 NOTE — H&P
[address] [address]  History and Physical Update    Pt Name: Angie Syed  MRN: [id number]  YOB: 1940  Date of evaluation: 2/26/2018    I have examined the patient and reviewed the H&P/Consult and there are no changes to the patient or plans.         Electronically signed by Cornell Benton MD on 3/5/2018 at 10:02 AM

## 2018-03-05 NOTE — OP NOTE
Pre-Procedure Note    Patient Name: Jeremy Brown   YOB: 1940  Medical Record Number: [id number]  Date: 2/26/2018    Indication:  Lower back pain    Consent: On file. Vital Signs:   Vitals:    03/05/18 1049   BP: (!) 116/54   Pulse: 75   Resp: 16   Temp: 98.2 °F (36.8 °C)   SpO2: 93%       Past Medical History:   has a past medical history of Arthritis; CAD (coronary artery disease); Depression; Diabetes mellitus ([address] GERD (gastroesophageal reflux disease); Hyperlipidemia; and Thyroid disease. Past Surgical History:   has a past surgical history that includes Cholecystectomy; Carpal tunnel release (Right, 1970); Tooth Extraction (1970); lumbar laminectomy (01/4863); Rotator cuff repair (Left, 1998); Retinopathy surgery (Bilateral, 1997); debridement (Left, 12/2003); Cataract removal (Bilateral, 09/2005); Cardiac catheterization (12/2005); cardiovascular stress test (08/2015); Colonoscopy; Upper gastrointestinal endoscopy (2012); Nerve Block Lumb Facet Level 1 Bilateral (Bilateral, 11/27/2017); Nerve Surgery (Bilateral, 02/06/2018); and pr inj dx/ther agnt paravert facet joint, lumbar/sac, 1st level (Bilateral, 2/6/2018). Pre-Sedation Documentation and Exam:   Vital signs have been reviewed (see flow sheet for vitals). Sedation/ Anesthesia Plan:   MAC    Patient is an appropriate candidate for plan of sedation: yes    Preoperative Diagnosis:  l-spondylosis    Post-Op Dx: as above    Procedure Performed:  :Radiofrequency ablation of median branches at the levels of L2-3,L3-4,L4-5 and L5-S1 right under fluoroscopic guidance     Indication for the Procedure: The patient has ahistory of chronic low back pain that is not responding well to the conservative treatment. Patient's pain is mostly axial in nature. Pain is interfering with the activities of daily living. Physical examination revealed facet tenderness and facet loading is positive.   Patient had undergone lumbar facet joint

## 2018-03-05 NOTE — ANESTHESIA PRE PROCEDURE
meals)     Historical Provider, MD   insulin regular (HUMULIN R;NOVOLIN R) 100 UNIT/ML injection Inject 10 Units into the skin See Admin Instructions Between 10-12 bid    Historical Provider, MD   Levothyroxine Sodium (L-THYROXINE SODIUM) 75 mcg by Does not apply route daily     Historical Provider, MD   loperamide (IMODIUM) 2 MG capsule Take 2 mg by mouth as needed. Historical Provider, MD   Multiple Vitamins-Minerals (MULTIVITAMIN & MINERAL PO) Take  by mouth daily. Historical Provider, MD   polycarbophil (FIBERCON) 625 MG tablet Take 625 mg by mouth daily. Historical Provider, MD   pioglitazone (ACTOS) 15 MG tablet Take 15 mg by mouth daily. Historical Provider, MD   sucralfate (CARAFATE) 1 GM tablet Take 1 g by mouth 2 times daily     Historical Provider, MD       Current medications:    No current outpatient prescriptions on file. No current facility-administered medications for this visit. Allergies:     Allergies   Allergen Reactions    Nsaids Other (See Comments)     GI bleed with large doses; does take small doses regularly        Problem List:    Patient Active Problem List   Diagnosis Code    Lumbar spondylosis M47.816    Spondylosis, lumbar, with myelopathy M47.16       Past Medical History:        Diagnosis Date    Arthritis     CAD (coronary artery disease)     Depression     Diabetes mellitus ([facility] [address]     GERD (gastroesophageal reflux disease)     Hyperlipidemia     Thyroid disease        Past Surgical History:        Procedure Laterality Date    CARDIAC CATHETERIZATION  12/2005    CARDIOVASCULAR STRESS TEST  08/2015    CARPAL TUNNEL RELEASE Right 1970    CATARACT REMOVAL Bilateral 09/2005    CHOLECYSTECTOMY      COLONOSCOPY      2004, 2007, 2012,  2017    DEBRIDEMENT Left 12/2003    Ear    LUMBAR LAMINECTOMY  04/1991    NERVE BLOCK LUMB FACET LEVEL 1 BILATERAL Bilateral 11/27/2017    LUMBAR FACET MBB   L3-4, L4-5, L5-S1 BILATERAL performed by Tim Patterson MD [place] [facility] Bilateral 02/06/2018    LUMBAR FACET MBB L3-4, L4-5, L5-S1    MS INJ DX/THER AGNT PARAVERT FACET JOINT, LUMBAR/SAC, 1ST LEVEL Bilateral 2/6/2018    LUMBAR FACET MBB #2  L3-4, L4-5, L5-S1 BILATERAL performed by Jordan Cobb MD at [address] [facility] Bilateral 1997   Areta  Left 1998    [address] [facility]  1970    UPPER GASTROINTESTINAL ENDOSCOPY  2012       Social History:    Social History   Substance Use Topics    Smoking status: Current Every Day Smoker     Types: Pipe, Cigars    Smokeless tobacco: Never Used    Alcohol use No                                Ready to quit: Not Answered  Counseling given: Not Answered      Vital Signs (Current): There were no vitals filed for this visit.                                            BP Readings from Last 3 Encounters:   03/05/18 (!) 116/54   02/20/18 (!) 103/51   02/06/18 (!) 88/39       NPO Status:                                                                                 BMI:   Wt Readings from Last 3 Encounters:   03/05/18 212 lb 3.2 oz (96.3 kg)   02/20/18 219 lb (99.3 kg)   02/06/18 213 lb 9.6 oz (96.9 kg)     There is no height or weight on file to calculate BMI.    CBC:   Lab Results   Component Value Date    WBC 2-4 10/01/2011    RBC 0-2 10/01/2011    HCT 36.6 10/01/2011    MCV 87.5 10/01/2011    RDW 14.7 10/01/2011     10/01/2011       CMP:   Lab Results   Component Value Date     05/12/2012    K 4.7 05/12/2012     05/12/2012    CO2 26 05/12/2012    BUN 28 05/12/2012    CREATININE 1.4 05/12/2012    LABGLOM 50 05/12/2012    GLUCOSE 112 11/07/2013    GLUCOSE 192 05/12/2012    PROT 6.7 05/12/2012    CALCIUM 9.5 05/12/2012    BILITOT 0.4 05/12/2012    ALKPHOS 47 05/12/2012    AST 43 05/12/2012    ALT 37 05/12/2012       POC Tests:   Recent Labs      03/05/18   1057   POCGLU  150*       Coags: No results found for: PROTIME, INR, APTT    HCG (If Applicable): No results found for: PREGTESTUR, PREGSERUM, HCG, HCGQUANT     ABGs: No results found for: PHART, PO2ART, YYW0TUD, VGR5LVX, BEART, U5YTETJC     Type & Screen (If Applicable):  Lab Results   Component Value Date    LABRH NEG 10/01/2011       Anesthesia Evaluation  Patient summary reviewed and Nursing notes reviewed  Airway: Mallampati: III  TM distance: >3 FB   Neck ROM: full  Mouth opening: > = 3 FB Dental:          Pulmonary: breath sounds clear to auscultation                             Cardiovascular:  Exercise tolerance: good (>4 METS),   (+) CAD:,         Rhythm: regular  Rate: normal                    Neuro/Psych:   (+) psychiatric history:            GI/Hepatic/Renal:   (+) GERD:,           Endo/Other:    (+) blood dyscrasia::., .                  ROS comment: Off plavix for 5 days Abdominal:       Abdomen: soft. Vascular:                                          Anesthesia Plan      MAC     ASA 3       Induction: intravenous. Anesthetic plan and risks discussed with patient and spouse.       Plan discussed with CRNA, attending and surgical team.                  Keshawn Martinez MD   3/5/2018

## 2018-03-19 ENCOUNTER — OFFICE VISIT (OUTPATIENT)
Dept: PHYSICAL MEDICINE AND REHAB | Age: 78
End: 2018-03-19
Payer: MEDICARE

## 2018-03-19 VITALS
SYSTOLIC BLOOD PRESSURE: 125 MMHG | WEIGHT: 217 LBS | DIASTOLIC BLOOD PRESSURE: 54 MMHG | BODY MASS INDEX: 34.06 KG/M2 | HEART RATE: 71 BPM | HEIGHT: 67 IN

## 2018-03-19 DIAGNOSIS — G89.4 CHRONIC PAIN SYNDROME: ICD-10-CM

## 2018-03-19 DIAGNOSIS — M48.061 SPINAL STENOSIS OF LUMBAR REGION WITHOUT NEUROGENIC CLAUDICATION: ICD-10-CM

## 2018-03-19 DIAGNOSIS — M47.816 SPONDYLOSIS OF LUMBAR REGION WITHOUT MYELOPATHY OR RADICULOPATHY: Primary | ICD-10-CM

## 2018-03-19 PROCEDURE — 99213 OFFICE O/P EST LOW 20 MIN: CPT | Performed by: NURSE PRACTITIONER

## 2018-03-19 RX ORDER — TRAMADOL HYDROCHLORIDE 50 MG/1
50 TABLET ORAL EVERY 8 HOURS PRN
Qty: 90 TABLET | Refills: 0 | Status: SHIPPED | OUTPATIENT
Start: 2018-03-19 | End: 2018-04-18

## 2018-03-19 RX ORDER — ASPIRIN 81 MG/1
81 TABLET, CHEWABLE ORAL DAILY
Status: ON HOLD | COMMUNITY
End: 2018-10-12 | Stop reason: HOSPADM

## 2018-03-19 ASSESSMENT — ENCOUNTER SYMPTOMS
EYE PAIN: 0
SHORTNESS OF BREATH: 0
NAUSEA: 0
ABDOMINAL PAIN: 0
CHEST TIGHTNESS: 0
PHOTOPHOBIA: 0
SORE THROAT: 0
RHINORRHEA: 0
BACK PAIN: 1
COLOR CHANGE: 0
WHEEZING: 0
SINUS PRESSURE: 0
DIARRHEA: 0
COUGH: 0
VOMITING: 0
CONSTIPATION: 0

## 2018-03-19 NOTE — PROGRESS NOTES
[facility]ESSIONAL [facility]S  [facility]  [address]  Dept: [phone]  Dept Fax: [phone]  Loc: [phone]    Visit Date: 3/19/2018    Functionality Assessment/Goals Worksheet     On a scale of 0 (Does not Interfere) to 10 (Completely Interferes)     1. Which number describes how during the past week pain has interfered with       the following:  A. General Activity:  6  B. Mood: 5  C. Walking Ability:  5  D. Normal Work (Includes both work outside the home and housework):  5  E. Relations with Other People:   6  F. Sleep:   5  G. Enjoyment of Life:   5    2. Patient Prefers to Take their Pain Medications:     [x]  On a regular basis   []  Only when necessary    []  Does not take pain medications    3. What are the Patient's Goals/Expectations for Visiting Pain Management? []  Learn about my pain    []  Receive Medication   []  Physical Therapy     []  Treat Depression   []  Receive Injections    []  Treat Sleep   []  Deal with Anxiety and Stress   []  Treat Opoid Dependence/Addiction   [x]  Other:\"Post-op checkup\"    HPI:   Sherie Gaming is a [de-identified] y.o. male is here today for    Chief Complaint:  Low back pain    HPI   F/U Lumbar RFA Right Side L3-4,4-5,5-S1 on 3/5/2018 states he is receiving about 80% pain relief on the right side. He has increased his activity and is able to walk and stand longer. He continues to take Tramadol and Cymbalta. He continues to have low back pain on the left side. Medications reviewed. Patient denies  side effects with medications. Patient states he is  taking medications as prescribed. He denies receiving pain medications from other sources. He denies any ER visits since last visit. Pain scale with out pain medications is 6/10. Pain scale with pain medications is  3/10. Last dose of Tramadol  Was on today  Drug screen reviewed from 1/4/2018     The patient is allergic to nsaids.     Past Medical History  Marta Arcos  has a normal heart sounds and intact distal pulses. Exam reveals no gallop and no friction rub. No murmur heard. Pulmonary/Chest: Effort normal and breath sounds normal. No respiratory distress. He has no wheezes. He has no rales. He exhibits no tenderness. Abdominal: Soft. Bowel sounds are normal. He exhibits no distension. There is no tenderness. There is no rebound and no guarding. Musculoskeletal: He exhibits tenderness. Right shoulder: Normal.        Left shoulder: Normal.        Right wrist: Normal.        Left wrist: Normal.        Right hip: Normal.        Left hip: Normal.        Right knee: Normal.        Left knee: Normal.        Right ankle: Normal.        Left ankle: Normal.        Cervical back: Normal.        Thoracic back: He exhibits tenderness. Lumbar back: He exhibits tenderness and pain. Back:    Neurological: He is alert and oriented to person, place, and time. He has normal strength and normal reflexes. He is not disoriented. He displays no atrophy. No cranial nerve deficit or sensory deficit. He exhibits normal muscle tone. He displays a negative Romberg sign. Coordination and gait normal. He displays no Babinski's sign on the right side. SLR-  Motor 5/5 BUE BLE   Skin: Skin is warm. No rash noted. He is not diaphoretic. No erythema. No pallor. Psychiatric: He has a normal mood and affect. His speech is normal and behavior is normal. Judgment and thought content normal. His mood appears not anxious. His affect is not angry, not blunt, not labile and not inappropriate. He is not agitated, not aggressive, not hyperactive, not slowed, not withdrawn, not actively hallucinating and not combative. Thought content is not paranoid and not delusional. Cognition and memory are normal. Cognition and memory are not impaired. He does not express impulsivity or inappropriate judgment. He does not exhibit a depressed mood. He expresses no homicidal and no suicidal ideation. He expresses no suicidal plans and no homicidal plans. He exhibits normal recent memory and normal remote memory. He is attentive. Nursing note and vitals reviewed. RONDA test: negative  Yeomans test: negative  Gaenslen test: negative     Assessment:     1. Spondylosis of lumbar region without myelopathy or radiculopathy    2. Spinal stenosis of lumbar region without neurogenic claudication    3. Chronic pain syndrome            Plan:      · OARRS reviewed. · Discussed long term side effects of medications. · Discussed tolerance, dependency and addiction. · Previous UDS reviewed. · UDS preformed today for compliance. · Patient told can not receive any pain medications from any other source. · Discussed with pt.may not be pain free. · No evidence of abuse, diversion or aberrant behavior. · Medications and/or procedures improve function and quality of life. .  · Plan Lumbar RFA Left Side @ L3-4,4-5,5-S1 risk and procedure reviewed  · Continue Tramadol patient has been stable and compliant. Meds. Prescribed:   Orders Placed This Encounter   Medications    traMADol (ULTRAM) 50 MG tablet     Sig: Take 1 tablet by mouth every 8 hours as needed for Pain for up to 30 days. Dispense:  90 tablet     Refill:  0       Return for Lumbar RFA Left Side @ L3-4,4-5,5-S1, Follow up after procedure, Follow up pain medications.          Electronically signed by Harjit Rodriguez CNP on 3/19/2018 at 1:12 PM

## 2018-03-27 ENCOUNTER — ANESTHESIA EVENT (OUTPATIENT)
Dept: OPERATING ROOM | Age: 78
End: 2018-03-27
Payer: MEDICARE

## 2018-03-27 ENCOUNTER — ANESTHESIA (OUTPATIENT)
Dept: OPERATING ROOM | Age: 78
End: 2018-03-27
Payer: MEDICARE

## 2018-03-27 ENCOUNTER — APPOINTMENT (OUTPATIENT)
Dept: GENERAL RADIOLOGY | Age: 78
End: 2018-03-27
Attending: PAIN MEDICINE
Payer: MEDICARE

## 2018-03-27 ENCOUNTER — HOSPITAL ENCOUNTER (OUTPATIENT)
Age: 78
Setting detail: OUTPATIENT SURGERY
Discharge: HOME | End: 2018-03-27
Attending: PAIN MEDICINE | Admitting: PAIN MEDICINE
Payer: MEDICARE

## 2018-03-27 VITALS
RESPIRATION RATE: 16 BRPM | DIASTOLIC BLOOD PRESSURE: 53 MMHG | WEIGHT: 211.8 LBS | BODY MASS INDEX: 32.1 KG/M2 | HEIGHT: 68 IN | TEMPERATURE: 97.3 F | HEART RATE: 68 BPM | OXYGEN SATURATION: 95 % | SYSTOLIC BLOOD PRESSURE: 109 MMHG

## 2018-03-27 VITALS
SYSTOLIC BLOOD PRESSURE: 96 MMHG | OXYGEN SATURATION: 94 % | RESPIRATION RATE: 18 BRPM | DIASTOLIC BLOOD PRESSURE: 52 MMHG

## 2018-03-27 LAB — GLUCOSE BLD-MCNC: 108 MG/DL (ref 70–108)

## 2018-03-27 PROCEDURE — 82948 REAGENT STRIP/BLOOD GLUCOSE: CPT

## 2018-03-27 PROCEDURE — 7100000011 HC PHASE II RECOVERY - ADDTL 15 MIN: Performed by: PAIN MEDICINE

## 2018-03-27 PROCEDURE — 3700000000 HC ANESTHESIA ATTENDED CARE: Performed by: PAIN MEDICINE

## 2018-03-27 PROCEDURE — 3209999900 FLUORO FOR SURGICAL PROCEDURES

## 2018-03-27 PROCEDURE — 2500000003 HC RX 250 WO HCPCS: Performed by: PAIN MEDICINE

## 2018-03-27 PROCEDURE — 3600000057 HC PAIN LEVEL 4 ADDL 15 MIN: Performed by: PAIN MEDICINE

## 2018-03-27 PROCEDURE — 7100000010 HC PHASE II RECOVERY - FIRST 15 MIN: Performed by: PAIN MEDICINE

## 2018-03-27 PROCEDURE — 6360000002 HC RX W HCPCS: Performed by: PAIN MEDICINE

## 2018-03-27 PROCEDURE — 3600000056 HC PAIN LEVEL 4 BASE: Performed by: PAIN MEDICINE

## 2018-03-27 PROCEDURE — 3700000001 HC ADD 15 MINUTES (ANESTHESIA): Performed by: PAIN MEDICINE

## 2018-03-27 PROCEDURE — 64635 DESTROY LUMB/SAC FACET JNT: CPT | Performed by: PAIN MEDICINE

## 2018-03-27 PROCEDURE — 6360000002 HC RX W HCPCS: Performed by: NURSE ANESTHETIST, CERTIFIED REGISTERED

## 2018-03-27 PROCEDURE — 64636 DESTROY L/S FACET JNT ADDL: CPT | Performed by: PAIN MEDICINE

## 2018-03-27 RX ORDER — LIDOCAINE HYDROCHLORIDE 10 MG/ML
INJECTION, SOLUTION EPIDURAL; INFILTRATION; INTRACAUDAL; PERINEURAL PRN
Status: DISCONTINUED | OUTPATIENT
Start: 2018-03-27 | End: 2018-03-27 | Stop reason: HOSPADM

## 2018-03-27 RX ORDER — PROPOFOL 10 MG/ML
INJECTION, EMULSION INTRAVENOUS PRN
Status: DISCONTINUED | OUTPATIENT
Start: 2018-03-27 | End: 2018-03-27 | Stop reason: SDUPTHER

## 2018-03-27 RX ORDER — FENTANYL CITRATE 50 UG/ML
INJECTION, SOLUTION INTRAMUSCULAR; INTRAVENOUS PRN
Status: DISCONTINUED | OUTPATIENT
Start: 2018-03-27 | End: 2018-03-27 | Stop reason: SDUPTHER

## 2018-03-27 RX ORDER — BUPIVACAINE HYDROCHLORIDE 2.5 MG/ML
INJECTION, SOLUTION EPIDURAL; INFILTRATION; INTRACAUDAL PRN
Status: DISCONTINUED | OUTPATIENT
Start: 2018-03-27 | End: 2018-03-27 | Stop reason: HOSPADM

## 2018-03-27 RX ORDER — BETAMETHASONE SODIUM PHOSPHATE AND BETAMETHASONE ACETATE 3; 3 MG/ML; MG/ML
INJECTION, SUSPENSION INTRA-ARTICULAR; INTRALESIONAL; INTRAMUSCULAR; SOFT TISSUE PRN
Status: DISCONTINUED | OUTPATIENT
Start: 2018-03-27 | End: 2018-03-27 | Stop reason: HOSPADM

## 2018-03-27 RX ADMIN — PROPOFOL 40 MG: 10 INJECTION, EMULSION INTRAVENOUS at 09:25

## 2018-03-27 RX ADMIN — FENTANYL CITRATE 50 MCG: 50 INJECTION INTRAMUSCULAR; INTRAVENOUS at 09:13

## 2018-03-27 ASSESSMENT — PAIN SCALES - GENERAL: PAINLEVEL_OUTOF10: 4

## 2018-03-27 ASSESSMENT — PAIN DESCRIPTION - DESCRIPTORS: DESCRIPTORS: ACHING

## 2018-03-27 ASSESSMENT — PAIN - FUNCTIONAL ASSESSMENT: PAIN_FUNCTIONAL_ASSESSMENT: 0-10

## 2018-03-27 NOTE — ANESTHESIA PRE PROCEDURE
10/01/2011     10/01/2011       CMP:   Lab Results   Component Value Date     05/12/2012    K 4.7 05/12/2012     05/12/2012    CO2 26 05/12/2012    BUN 28 05/12/2012    CREATININE 1.4 05/12/2012    LABGLOM 50 05/12/2012    GLUCOSE 112 11/07/2013    GLUCOSE 192 05/12/2012    PROT 6.7 05/12/2012    CALCIUM 9.5 05/12/2012    BILITOT 0.4 05/12/2012    ALKPHOS 47 05/12/2012    AST 43 05/12/2012    ALT 37 05/12/2012       POC Tests:   Recent Labs      03/27/18   0812   POCGLU  108       Coags: No results found for: PROTIME, INR, APTT    HCG (If Applicable): No results found for: PREGTESTUR, PREGSERUM, HCG, HCGQUANT     ABGs: No results found for: PHART, PO2ART, BCI4RPO, ZIG5XHG, BEART, Z7XTSJIH     Type & Screen (If Applicable):  Lab Results   Component Value Date    LABRH NEG 10/01/2011       Anesthesia Evaluation  Patient summary reviewed  Airway: Mallampati: III  TM distance: >3 FB   Neck ROM: full  Mouth opening: > = 3 FB Dental:    (+) upper dentures and lower dentures      Pulmonary:                              Cardiovascular:    (+) CAD:,                 Cleared by cardiology              Neuro/Psych:               GI/Hepatic/Renal:   (+) GERD:,           Endo/Other:    (+) Diabetes, . Abdominal:           Vascular:                                      Anesthesia Plan      MAC     ASA 3       Induction: intravenous. Anesthetic plan and risks discussed with patient and spouse. Plan discussed with KRISH. Bijan Lemon.  [facility]   3/27/2018

## 2018-03-27 NOTE — ANESTHESIA POSTPROCEDURE EVALUATION
Department of Anesthesiology  Postprocedure Note    Patient: Mireya Flynn  MRN: [id number]  YOB: 1940  Date of evaluation: 3/27/2018  Time:  10:28 AM     Procedure Summary     Date:  03/27/18 Room / Location:  [facility] [address]    Anesthesia Start:  0913 Anesthesia Stop:  0933    Procedure:  LUMBAR RFA LEFT SIDE FIRST @L2-3, L3-4,4-5,5-S1 (Left ) Diagnosis:  (LUMBAR SPONDYLOSIS)    Surgeon:  Kylie Gamboa MD Responsible Provider:  Seda Zee DO    Anesthesia Type:  MAC ASA Status:  3          Anesthesia Type: MAC    Pino Phase I:      Pino Phase II: Pino Score: 10    Last vitals: Reviewed and per EMR flowsheets. Anesthesia Post Evaluation    Comments: Mendy Ca [address]  POST-ANESTHESIA NOTE       Name:  Mireya Flynn                                         Age:  [de-identified] y.o. MRN:  [id number]      Last Vitals:  BP (!) 109/53   Pulse 68   Temp 97.3 °F (36.3 °C) (Temporal)   Resp 16   Ht 5' 8\" (1.727 m)   Wt 211 lb 12.8 oz (96.1 kg)   SpO2 95%   BMI 32.20 kg/m²   Patient Vitals in the past 4 hrs:  03/27/18 0955, BP:(!) 109/53  03/27/18 0933, BP:(!) 89/53, Temp:97.3 °F (36.3 °C), Temp src:Temporal, Pulse:68, Resp:16, SpO2:95 %  03/27/18 0806, BP:125/61, Temp:97.1 °F (36.2 °C), Temp src:Temporal, Pulse:69, Resp:16, SpO2:96 %, Height:5' 8\" (1.727 m), Weight:211 lb 12.8 oz (96.1 kg)    Level of Consciousness:  Awake    Respiratory:  Stable    Oxygen Saturation:  Stable    Cardiovascular:  Stable    Hydration:  Adequate    PONV:  Stable    Post-op Pain:  Adequate analgesia    Post-op Assessment:  No apparent anesthetic complications    Additional Follow-Up / Treatment / Comment:  None    Kiley Wahl DO  March 27, 2018   10:28 AM

## 2018-03-27 NOTE — PROGRESS NOTES
3816-  Patient arrived to phase II via cart. Spontaneous respirations even and unlabored. Placed on monitor--VSS. Report received from [facility].    1238-  Assessment completed. Patient is alert and oriented x4. IV capped off-- no complications. Patient states pain is tolerable. Injection sites clean and dry. 4889-  Snack and drink given to patient. Family brought to room. 46-  Belongings brought to patient. 0955-  BP recheck 109-53.    1000-  Patient dressing with spouse's assistance. 1015-  Discharge instructions given. Understanding verbalized. 1020-  Patient discharged in stable condition with all belongings. This RN walked patient to car.

## 2018-05-02 ENCOUNTER — OFFICE VISIT (OUTPATIENT)
Dept: PHYSICAL MEDICINE AND REHAB | Age: 78
End: 2018-05-02
Payer: MEDICARE

## 2018-05-02 VITALS
HEART RATE: 73 BPM | BODY MASS INDEX: 33.74 KG/M2 | DIASTOLIC BLOOD PRESSURE: 59 MMHG | SYSTOLIC BLOOD PRESSURE: 94 MMHG | WEIGHT: 215 LBS | HEIGHT: 67 IN

## 2018-05-02 DIAGNOSIS — G89.4 CHRONIC PAIN SYNDROME: ICD-10-CM

## 2018-05-02 DIAGNOSIS — M47.816 SPONDYLOSIS OF LUMBAR REGION WITHOUT MYELOPATHY OR RADICULOPATHY: ICD-10-CM

## 2018-05-02 DIAGNOSIS — M48.061 SPINAL STENOSIS OF LUMBAR REGION WITHOUT NEUROGENIC CLAUDICATION: Primary | ICD-10-CM

## 2018-05-02 PROCEDURE — 99214 OFFICE O/P EST MOD 30 MIN: CPT | Performed by: NURSE PRACTITIONER

## 2018-05-02 RX ORDER — TRAMADOL HYDROCHLORIDE 50 MG/1
50 TABLET ORAL EVERY 8 HOURS PRN
Qty: 90 TABLET | Refills: 0 | Status: SHIPPED | OUTPATIENT
Start: 2018-05-02 | End: 2018-06-01

## 2018-05-02 RX ORDER — TRAMADOL HYDROCHLORIDE 50 MG/1
50 TABLET ORAL EVERY 8 HOURS PRN
COMMUNITY
End: 2018-05-02 | Stop reason: SDUPTHER

## 2018-05-02 ASSESSMENT — ENCOUNTER SYMPTOMS
COLOR CHANGE: 0
PHOTOPHOBIA: 0
DIARRHEA: 0
COUGH: 0
NAUSEA: 0
BACK PAIN: 1
RHINORRHEA: 0
CONSTIPATION: 0
CHEST TIGHTNESS: 0
ABDOMINAL PAIN: 0
WHEEZING: 0
EYE PAIN: 0
SHORTNESS OF BREATH: 0
VOMITING: 0
SINUS PRESSURE: 0
SORE THROAT: 0

## 2018-05-14 ENCOUNTER — TELEPHONE (OUTPATIENT)
Dept: PHYSICAL MEDICINE AND REHAB | Age: 78
End: 2018-05-14

## 2018-06-04 ENCOUNTER — HOSPITAL ENCOUNTER (OUTPATIENT)
Age: 78
Discharge: HOME | End: 2018-06-04
Payer: MEDICARE

## 2018-06-04 LAB
T4 FREE: 1.49 NG/DL (ref 0.93–1.76)
TSH SERPL DL<=0.05 MIU/L-ACNC: 1.09 UIU/ML (ref 0.4–4.2)

## 2018-06-04 PROCEDURE — 84443 ASSAY THYROID STIM HORMONE: CPT

## 2018-06-04 PROCEDURE — 36415 COLL VENOUS BLD VENIPUNCTURE: CPT

## 2018-06-04 PROCEDURE — 84439 ASSAY OF FREE THYROXINE: CPT

## 2018-06-12 DIAGNOSIS — G89.4 CHRONIC PAIN SYNDROME: Primary | ICD-10-CM

## 2018-06-12 RX ORDER — TRAMADOL HYDROCHLORIDE 50 MG/1
50 TABLET ORAL EVERY 8 HOURS PRN
Qty: 90 TABLET | Refills: 0 | Status: SHIPPED | OUTPATIENT
Start: 2018-06-12 | End: 2018-07-12

## 2018-07-11 ENCOUNTER — APPOINTMENT (OUTPATIENT)
Dept: CARDIAC CATH/INVASIVE PROCEDURES | Age: 78
End: 2018-07-11
Attending: INTERNAL MEDICINE
Payer: MEDICARE

## 2018-07-11 ENCOUNTER — TELEPHONE (OUTPATIENT)
Dept: PHYSICAL MEDICINE AND REHAB | Age: 78
End: 2018-07-11

## 2018-07-19 ENCOUNTER — ANESTHESIA EVENT (OUTPATIENT)
Dept: OPERATING ROOM | Age: 78
End: 2018-07-19
Payer: MEDICARE

## 2018-07-19 ENCOUNTER — HOSPITAL ENCOUNTER (OUTPATIENT)
Age: 78
Setting detail: OUTPATIENT SURGERY
Discharge: HOME | End: 2018-07-19
Attending: PAIN MEDICINE | Admitting: PAIN MEDICINE
Payer: MEDICARE

## 2018-07-19 ENCOUNTER — ANESTHESIA (OUTPATIENT)
Dept: OPERATING ROOM | Age: 78
End: 2018-07-19
Payer: MEDICARE

## 2018-07-19 ENCOUNTER — APPOINTMENT (OUTPATIENT)
Dept: GENERAL RADIOLOGY | Age: 78
End: 2018-07-19
Attending: PAIN MEDICINE
Payer: MEDICARE

## 2018-07-19 VITALS
OXYGEN SATURATION: 95 % | HEART RATE: 68 BPM | RESPIRATION RATE: 18 BRPM | SYSTOLIC BLOOD PRESSURE: 112 MMHG | WEIGHT: 201 LBS | DIASTOLIC BLOOD PRESSURE: 54 MMHG | TEMPERATURE: 97.7 F | HEIGHT: 68 IN | BODY MASS INDEX: 30.46 KG/M2

## 2018-07-19 VITALS
OXYGEN SATURATION: 100 % | DIASTOLIC BLOOD PRESSURE: 55 MMHG | RESPIRATION RATE: 7 BRPM | SYSTOLIC BLOOD PRESSURE: 114 MMHG

## 2018-07-19 LAB — GLUCOSE BLD-MCNC: 213 MG/DL (ref 70–108)

## 2018-07-19 PROCEDURE — 7100000010 HC PHASE II RECOVERY - FIRST 15 MIN: Performed by: PAIN MEDICINE

## 2018-07-19 PROCEDURE — 3209999900 FLUORO FOR SURGICAL PROCEDURES

## 2018-07-19 PROCEDURE — 3600000054 HC PAIN LEVEL 3 BASE: Performed by: PAIN MEDICINE

## 2018-07-19 PROCEDURE — 2500000003 HC RX 250 WO HCPCS: Performed by: PAIN MEDICINE

## 2018-07-19 PROCEDURE — 2580000003 HC RX 258: Performed by: PAIN MEDICINE

## 2018-07-19 PROCEDURE — 3700000000 HC ANESTHESIA ATTENDED CARE: Performed by: PAIN MEDICINE

## 2018-07-19 PROCEDURE — 6360000002 HC RX W HCPCS: Performed by: PAIN MEDICINE

## 2018-07-19 PROCEDURE — 82948 REAGENT STRIP/BLOOD GLUCOSE: CPT

## 2018-07-19 PROCEDURE — 62323 NJX INTERLAMINAR LMBR/SAC: CPT | Performed by: PAIN MEDICINE

## 2018-07-19 PROCEDURE — 2709999900 HC NON-CHARGEABLE SUPPLY: Performed by: PAIN MEDICINE

## 2018-07-19 PROCEDURE — 6360000004 HC RX CONTRAST MEDICATION: Performed by: PAIN MEDICINE

## 2018-07-19 PROCEDURE — 6360000002 HC RX W HCPCS: Performed by: NURSE ANESTHETIST, CERTIFIED REGISTERED

## 2018-07-19 PROCEDURE — 7100000011 HC PHASE II RECOVERY - ADDTL 15 MIN: Performed by: PAIN MEDICINE

## 2018-07-19 RX ORDER — DEXAMETHASONE SODIUM PHOSPHATE 4 MG/ML
INJECTION, SOLUTION INTRA-ARTICULAR; INTRALESIONAL; INTRAMUSCULAR; INTRAVENOUS; SOFT TISSUE PRN
Status: DISCONTINUED | OUTPATIENT
Start: 2018-07-19 | End: 2018-07-19 | Stop reason: HOSPADM

## 2018-07-19 RX ORDER — 0.9 % SODIUM CHLORIDE 0.9 %
VIAL (ML) INJECTION PRN
Status: DISCONTINUED | OUTPATIENT
Start: 2018-07-19 | End: 2018-07-19 | Stop reason: HOSPADM

## 2018-07-19 RX ORDER — FENTANYL CITRATE 50 UG/ML
INJECTION, SOLUTION INTRAMUSCULAR; INTRAVENOUS PRN
Status: DISCONTINUED | OUTPATIENT
Start: 2018-07-19 | End: 2018-07-19 | Stop reason: SDUPTHER

## 2018-07-19 RX ORDER — LIDOCAINE HYDROCHLORIDE 10 MG/ML
INJECTION, SOLUTION INFILTRATION; PERINEURAL PRN
Status: DISCONTINUED | OUTPATIENT
Start: 2018-07-19 | End: 2018-07-19 | Stop reason: HOSPADM

## 2018-07-19 RX ORDER — PROPOFOL 10 MG/ML
INJECTION, EMULSION INTRAVENOUS PRN
Status: DISCONTINUED | OUTPATIENT
Start: 2018-07-19 | End: 2018-07-19 | Stop reason: SDUPTHER

## 2018-07-19 RX ADMIN — PROPOFOL 20 MG: 10 INJECTION, EMULSION INTRAVENOUS at 12:01

## 2018-07-19 RX ADMIN — FENTANYL CITRATE 100 MCG: 50 INJECTION INTRAMUSCULAR; INTRAVENOUS at 12:01

## 2018-07-19 ASSESSMENT — PAIN SCALES - GENERAL: PAINLEVEL_OUTOF10: 0

## 2018-07-19 ASSESSMENT — PAIN DESCRIPTION - DESCRIPTORS: DESCRIPTORS: CONSTANT;SHARP

## 2018-07-19 ASSESSMENT — PAIN - FUNCTIONAL ASSESSMENT: PAIN_FUNCTIONAL_ASSESSMENT: 0-10

## 2018-07-19 NOTE — ANESTHESIA PRE PROCEDURE
Department of Anesthesiology  Preprocedure Note       Name:  Katiuska Jones   Age:  [de-identified] y.o.  :  1940                                          MRN:  [id number]         Date:  2018      Surgeon: Kendall Richmond):  Zach Miguel MD    Procedure: Procedure(s):  [email or web address]    Medications prior to admission:   Prior to Admission medications    Medication Sig Start Date End Date Taking? Authorizing Provider   levothyroxine (SYNTHROID) 100 MCG tablet Take 100 mcg by mouth Daily    Historical Provider, MD   metoprolol succinate (TOPROL XL) 100 MG extended release tablet Take 100 mg by mouth daily    Historical Provider, MD   pioglitazone (ACTOS) 30 MG tablet Take 30 mg by mouth daily    Historical Provider, MD   rosuvastatin (CRESTOR) 10 MG tablet Take 10 mg by mouth daily    Historical Provider, MD   aspirin 81 MG chewable tablet Take 81 mg by mouth daily    Historical Provider, MD   amLODIPine (NORVASC) 2.5 MG tablet Take 2.5 mg by mouth daily    Historical Provider, MD   donepezil (ARICEPT) 10 MG tablet Take 10 mg by mouth nightly    Historical Provider, MD   lansoprazole (PREVACID) 30 MG delayed release capsule Take 30 mg by mouth daily    Historical Provider, MD   acetaminophen (TYLENOL) 325 MG tablet Take 650 mg by mouth daily    Historical Provider, MD   ferrous sulfate 325 (65 Fe) MG tablet Take 325 mg by mouth daily (with breakfast)    Historical Provider, MD   Cholecalciferol (VITAMIN D3) 5000 units TABS Take 5,000 Units by mouth daily    Historical Provider, MD   ramipril (ALTACE) 5 MG capsule Take 5 mg by mouth daily. Historical Provider, MD   fenofibrate (TRICOR) 145 MG tablet Take 160 mg by mouth daily     Historical Provider, MD   clopidogrel (PLAVIX) 75 MG tablet Take 75 mg by mouth daily. Historical Provider, MD   isosorbide mononitrate (IMDUR) 30 MG CR tablet Take 30 mg by mouth daily. Historical Provider, MD   DULoxetine (CYMBALTA) 60 MG capsule Take 60 mg by mouth daily.     Historical Provider, MD   insulin NPH (HUMULIN N;NOVOLIN N) 100 UNIT/ML injection Inject 20 Units into the skin 2 times daily (before meals)     Historical Provider, MD   insulin regular (HUMULIN R;NOVOLIN R) 100 UNIT/ML injection Inject 10 Units into the skin 2 times daily (before meals) Between 10-12 bid    Historical Provider, MD   loperamide (IMODIUM) 2 MG capsule Take 2 mg by mouth as needed. Historical Provider, MD   Multiple Vitamins-Minerals (MULTIVITAMIN & MINERAL PO) Take  by mouth daily. Historical Provider, MD   polycarbophil (FIBERCON) 625 MG tablet Take 625 mg by mouth daily. Historical Provider, MD   sucralfate (CARAFATE) 1 GM tablet Take 1 g by mouth 2 times daily as needed     Historical Provider, MD       Current medications:    No current facility-administered medications for this encounter. Allergies:     Allergies   Allergen Reactions    Nsaids Other (See Comments)     GI bleed with large doses; does take small doses regularly        Problem List:    Patient Active Problem List   Diagnosis Code    Lumbar spondylosis M47.816    Spondylosis, lumbar, with myelopathy M47.16       Past Medical History:        Diagnosis Date    Arthritis     CAD (coronary artery disease)     Depression     Diabetes mellitus ([facility] [address]     GERD (gastroesophageal reflux disease)     Hyperlipidemia     Thyroid disease        Past Surgical History:        Procedure Laterality Date    BACK SURGERY  04/1991    lumbar laminectomy    CARDIAC CATHETERIZATION  12/2005    CARDIOVASCULAR STRESS TEST  08/2015    CARPAL TUNNEL RELEASE Right 1970    CHOLECYSTECTOMY      COLONOSCOPY      2004, 2007, 2012,  2017    DEBRIDEMENT Left 12/2003    Ear    EYE SURGERY  2005    bilateral cataracts    NERVE BLOCK LUMB FACET LEVEL 1 BILATERAL Bilateral 11/27/2017    LUMBAR FACET MBB   L3-4, L4-5, L5-S1 BILATERAL performed by Bhumi Guevara MD at [address] [place] [address] Bilateral 02/06/2018    LUMBAR FACET MBB L3-4, L4-5, L5-S1    OTHER SURGICAL HISTORY Right 03/05/2018    Lumbar RFA at L3-4, L4-5, L5-S1    OTHER SURGICAL HISTORY Left 03/27/2018    Lumbar RFA at L3-4, L4-5, L5-S1    VT INJ DX/THER AGNT PARAVERT FACET JOINT, LUMBAR/SAC, 1ST LEVEL Bilateral 2/6/2018    LUMBAR FACET MBB #2  L3-4, L4-5, L5-S1 BILATERAL performed by Surjit Graves MD at [facility] Right 3/5/2018    LUMBAR RFA RIGHT SIDE FIRST @ Q5-3,6-6,6-V4 performed by Surjit Graves MD at [facility] UNLISTED Left 3/27/2018    LUMBAR RFA LEFT SIDE FIRST @L2-3, L3-4,4-5,5-S1 performed by Surjit Graves MD at [address] [place] [address] Bilateral [address] Left 1998    TOOTH EXTRACTION  1970    UPPER GASTROINTESTINAL ENDOSCOPY  2012       Social History:    Social History   Substance Use Topics    Smoking status: Current Every Day Smoker     Types: Pipe, Cigars    Smokeless tobacco: Never Used    Alcohol use No                                Ready to quit: Not Answered  Counseling given: Not Answered      Vital Signs (Current): There were no vitals filed for this visit.                                            BP Readings from Last 3 Encounters:   07/11/18 (!) 104/49   05/02/18 (!) 94/59   03/27/18 (!) 109/53       NPO Status:                                                                                 BMI:   Wt Readings from Last 3 Encounters:   07/11/18 210 lb (95.3 kg)   05/02/18 215 lb (97.5 kg)   03/27/18 211 lb 12.8 oz (96.1 kg)     There is no height or weight on file to calculate BMI.    CBC:   Lab Results   Component Value Date    WBC 5.5 07/11/2018    RBC 3.66 07/11/2018    RBC 0-2 10/01/2011    HGB 11.3 07/11/2018    HCT 34.7 07/11/2018    MCV 94.8 07/11/2018    RDW 14.7 10/01/2011     07/11/2018       CMP:   Lab Results   Component Value Date     07/11/2018    K 4.8 07/11/2018     07/11/2018    CO2 23 07/11/2018    BUN 31 07/11/2018    CREATININE 1.3 07/11/2018    LABGLOM 53 07/11/2018    GLUCOSE 155 07/11/2018    GLUCOSE 192 05/12/2012    PROT 5.8 07/11/2018    CALCIUM 9.2 07/11/2018    BILITOT 0.3 07/11/2018    ALKPHOS 48 07/11/2018    AST 15 07/11/2018    ALT 10 07/11/2018       POC Tests: No results for input(s): POCGLU, POCNA, POCK, POCCL, POCBUN, POCHEMO, POCHCT in the last 72 hours. Coags: No results found for: PROTIME, INR, APTT    HCG (If Applicable): No results found for: PREGTESTUR, PREGSERUM, HCG, HCGQUANT     ABGs: No results found for: PHART, PO2ART, GHN7JSE, JJI6YPB, BEART, B7EGAKUE     Type & Screen (If Applicable):  Lab Results   Component Value Date    LABRH NEG 07/11/2018       Anesthesia Evaluation    Airway: Mallampati: II  TM distance: >3 FB   Neck ROM: full  Mouth opening: > = 3 FB Dental:          Pulmonary: breath sounds clear to auscultation                             Cardiovascular:    (+) CAD:,         Rhythm: regular                      Neuro/Psych:   (+) psychiatric history:            GI/Hepatic/Renal:   (+) GERD:,           Endo/Other:    (+) Diabetes, . Abdominal:   (+) obese,         Vascular:                                        Anesthesia Plan      MAC     ASA 3     (Reviewed  recent cath report  Severe CAD  NOT AMENABLE FOR INTERVENTION)  Induction: intravenous. Anesthetic plan and risks discussed with patient and spouse. Plan discussed with CRNA.                   Amrita Kim MD   7/19/2018

## 2018-07-19 NOTE — OP NOTE
Pre-Procedure Note    Patient Name: Nell Hodgkins   YOB: 1940  Medical Record Number: [id number]  Date: 7/12/2018       Indication: Lower back and leg pain    Consent: On file. Vital Signs:   Vitals:    07/19/18 1113   BP: (!) 126/59   Pulse: 67   Resp: 16   Temp: 97 °F (36.1 °C)   SpO2: 97%       Past Medical History:   has a past medical history of Arthritis; CAD (coronary artery disease); Depression; Diabetes mellitus ([address] GERD (gastroesophageal reflux disease); Hyperlipidemia; and Thyroid disease. Past Surgical History:   has a past surgical history that includes Cholecystectomy; Carpal tunnel release (Right, 1970); Tooth Extraction (1970); Rotator cuff repair (Left, 1998); Retinopathy surgery (Bilateral, 1997); debridement (Left, 12/2003); cardiovascular stress test (08/2015); Colonoscopy; Upper gastrointestinal endoscopy (2012); Nerve Block Lumb Facet Level 1 Bilateral (Bilateral, 11/27/2017); Nerve Surgery (Bilateral, 02/06/2018); pr inj dx/ther agnt paravert facet joint, lumbar/sac, 1st level (Bilateral, 2/6/2018); other surgical history (Right, 03/05/2018); pr nervous system surgery unlisted (Right, 3/5/2018); other surgical history (Left, 03/27/2018); pr nervous system surgery unlisted (Left, 3/27/2018); eye surgery (2005); back surgery (04/1991); Cardiac catheterization (12/2005); and Cardiac catheterization (07/11/2018). Pre-Sedation Documentation and Exam:   Vital signs have been reviewed (see flow sheet for vitals). Sedation/Anesthesia Plan:   MAC    Medications Planned:   Per Anesthesia. Patient is an appropriate candidate for plan of sedation: yes    Preoperative Diagnosis:  L-spinal stenosis    Post-Op Dx: as above    Procedure Performed:  Lumbar epidural steroid injection under fluoroscopy guidance # 1. Indication for the Procedure: The patient failed conservative management  for pain in the low back radiating to lower extremities.   The patient is undergoing lumbar epidural steroid injection. As the patient is not responding to conservative management and it is interfering with activities of daily living we decided to proceed with lumbar epidural steroid injection. The procedure and risks were discussed with the patient and an informed consent was obtained    Procedure: The patient is placed in prone position. Skin over the back was prepped and draped in sterile manner. Then using fluoroscopy the L5 interspace was observed and the skin and deep tissues in the  paramedian area were infiltrated with 5 ml of 1% lidocaine. The #20-gauge, 3-1/2 inch Tuohy needle was inserted through the skin wheal and the epidural space was identified using loss of resistance technique . This was confirmed with AP and lateral views using fluoroscopy after injecting about 2 ml of Omnipaque-180 and observing the spread of the contrast in the epidural space. Then after negative aspiration a total of 80 mg of depomedrol with 3 ml of normal saline was injected into the epidural space. The needle is removed and a Band-Aid was placed over the needle insertion site. No paresthesia. Patient's vital signs remained stable and the patient tolerated the procedure well. The patient will be discharged home in stable condition and will be followed in the office in the next few weeks for further planning.     Electronically signed by Nini Ramos MD on 7/19/2018 at 12:08 PM

## 2018-07-19 NOTE — PROGRESS NOTES
Resting quietly in recliner with feet elevated and call light in reach. Dr. Eileen Joshi and Dr. Mary Stephens updated on cardiac catheretization report-ok to continue with procedure.

## 2018-07-24 NOTE — H&P
Chief Complaint:  Low back pain   HPI   F/U Lumbar RFA Bilateral Right 3/5/2018 Left 3/27/2018 L2-3,3-4,4-5,5-S1. States the right has worn off and he did not receive any relief form the left RFA. He continues to have low back pain and a hard time standing and walking and has to use his walker more. He continues to take Tramadol and Cymbalta. He has bilateral foot numbness tingling burning but has always shad that and has diabetic neuropathy also. He has bilateral leg weakness. He is able to stand a little longer in the shower. Medications reviewed. Patient denies  side effects with medications. Patient states he is  taking medications as prescribed. He denies receiving pain medications from other sources. He denies any ER visits since last visit. Pain scale with out pain medications is 7/10. Pain scale with pain medications is  4/10. Last dose of Tramadol Was on today  Drug screen reviewed from 3/19/2018 OK     The patient is allergic to nsaids.     Past Medical History  Indu Howell  has a past medical history of Arthritis; CAD (coronary artery disease); Depression; Diabetes mellitus ([facility] [address] GERD (gastroesophageal reflux disease); Hyperlipidemia; and Thyroid disease.     Past Surgical History  The patient  has a past surgical history that includes Cholecystectomy; Carpal tunnel release (Right, 1970); Tooth Extraction (1970); lumbar laminectomy (19/9679); Rotator cuff repair (Left, 1998); Retinopathy surgery (Bilateral, 1997); debridement (Left, 12/2003); Cataract removal (Bilateral, 09/2005); Cardiac catheterization (12/2005); cardiovascular stress test (08/2015); Colonoscopy; Upper gastrointestinal endoscopy (2012); Nerve Block Lumb Facet Level 1 Bilateral (Bilateral, 11/27/2017);  Nerve Surgery (Bilateral, 02/06/2018); pr inj dx/ther agnt paravert facet joint, lumbar/sac, 1st level (Bilateral, 2/6/2018); other surgical history (Right, 03/05/2018); pr nervous system surgery unlisted (Right, 3/5/2018); other surgical history (Left, 03/27/2018); and pr nervous system surgery unlisted (Left, 3/27/2018).    Family History  This patient's family history is not on file.     Social History  Paul Khalil  reports that he has been smoking Pipe and Cigars. He has never used smokeless tobacco. He reports that he does not drink alcohol or use drugs.     Medications    Current Medication      Current Outpatient Prescriptions:     traMADol (ULTRAM) 50 MG tablet, Take 1 tablet by mouth every 8 hours as needed for Pain for up to 30 days. ., Disp: 90 tablet, Rfl: 0    aspirin 81 MG chewable tablet, Take 81 mg by mouth daily, Disp: , Rfl:     amLODIPine (NORVASC) 2.5 MG tablet, Take 2.5 mg by mouth daily, Disp: , Rfl:     donepezil (ARICEPT) 10 MG tablet, Take 10 mg by mouth nightly, Disp: , Rfl:     lansoprazole (PREVACID) 30 MG delayed release capsule, Take 30 mg by mouth daily, Disp: , Rfl:     acetaminophen (TYLENOL) 325 MG tablet, Take 650 mg by mouth daily, Disp: , Rfl:     ferrous sulfate 325 (65 Fe) MG tablet, Take 325 mg by mouth daily (with breakfast), Disp: , Rfl:     Cholecalciferol (VITAMIN D3) 5000 units TABS, Take 5,000 Units by mouth daily, Disp: , Rfl:     ramipril (ALTACE) 5 MG capsule, Take 5 mg by mouth daily. , Disp: , Rfl:     metoprolol (TOPROL-XL) 50 MG XL tablet, Take 100 mg by mouth daily , Disp: , Rfl:     fenofibrate (TRICOR) 145 MG tablet, Take 160 mg by mouth daily , Disp: , Rfl:     clopidogrel (PLAVIX) 75 MG tablet, Take 75 mg by mouth daily. , Disp: , Rfl:     isosorbide mononitrate (IMDUR) 30 MG CR tablet, Take 30 mg by mouth daily. , Disp: , Rfl:     DULoxetine (CYMBALTA) 60 MG capsule, Take 60 mg by mouth daily. , Disp: , Rfl:     insulin NPH (HUMULIN N;NOVOLIN N) 100 UNIT/ML injection, Inject 20 Units into the skin 2 times daily (before meals) , Disp: , Rfl:     insulin regular (HUMULIN R;NOVOLIN R) 100 UNIT/ML injection, Inject 10 Units into the skin 2 times daily (before meals) Between 10-12 bid, Disp: , Rfl:     Levothyroxine Sodium (L-THYROXINE SODIUM), 75 mcg by Does not apply route daily , Disp: , Rfl:     loperamide (IMODIUM) 2 MG capsule, Take 2 mg by mouth as needed. , Disp: , Rfl:     Multiple Vitamins-Minerals (MULTIVITAMIN & MINERAL PO), Take  by mouth daily. , Disp: , Rfl:     polycarbophil (FIBERCON) 625 MG tablet, Take 625 mg by mouth daily. , Disp: , Rfl:     pioglitazone (ACTOS) 15 MG tablet, Take 15 mg by mouth daily. , Disp: , Rfl:     sucralfate (CARAFATE) 1 GM tablet, Take 1 g by mouth 2 times daily as needed , Disp: , Rfl:         Subjective:      Review of Systems   Constitutional: Negative for activity change, appetite change, chills, diaphoresis, fatigue, fever and unexpected weight change. HENT: Negative for congestion, ear pain, hearing loss, mouth sores, nosebleeds, rhinorrhea, sinus pressure and sore throat. Eyes: Negative for photophobia, pain and visual disturbance. Respiratory: Negative for cough, chest tightness, shortness of breath and wheezing. Cardiovascular: Negative for chest pain and palpitations. Gastrointestinal: Negative for abdominal pain, constipation, diarrhea, nausea and vomiting. Endocrine: Negative for cold intolerance, heat intolerance, polydipsia, polyphagia and polyuria. Genitourinary: Negative for decreased urine volume, difficulty urinating, frequency and hematuria. Musculoskeletal: Positive for back pain. Negative for arthralgias, gait problem, joint swelling, myalgias, neck pain and neck stiffness. Skin: Negative for color change and rash. Allergic/Immunologic: Negative for food allergies and immunocompromised state. Neurological: Positive for weakness and numbness. Negative for dizziness, tremors, seizures, syncope, facial asymmetry, speech difficulty, light-headedness and headaches. Hematological: Does not bruise/bleed easily.    Psychiatric/Behavioral: Negative for agitation, behavioral problems, confusion, decreased concentration, dysphoric mood, hallucinations, self-injury, sleep disturbance and suicidal ideas. The patient is not nervous/anxious and is not hyperactive.          Objective:      Vitals       Vitals:     05/02/18 1518   BP: (!) 94/59   Pulse: 73   Weight: 215 lb (97.5 kg)   Height: 5' 7\" (1.702 m)            Physical Exam   Constitutional: He is oriented to person, place, and time. He appears well-developed and well-nourished. No distress. HENT:   Head: Normocephalic and atraumatic. Right Ear: External ear normal.   Left Ear: External ear normal.   Nose: Nose normal.   Mouth/Throat: Oropharynx is clear and moist. No oropharyngeal exudate. Eyes: Conjunctivae and EOM are normal. Pupils are equal, round, and reactive to light. Right eye exhibits no discharge. Left eye exhibits no discharge. No scleral icterus. Neck: Normal range of motion and full passive range of motion without pain. Neck supple. No muscular tenderness present. No neck rigidity. No edema, no erythema and normal range of motion present. No thyromegaly present. Cardiovascular: Normal rate, regular rhythm, normal heart sounds and intact distal pulses. Exam reveals no gallop and no friction rub. No murmur heard. Pulmonary/Chest: Effort normal and breath sounds normal. No respiratory distress. He has no wheezes. He has no rales. He exhibits no tenderness. Abdominal: Soft. Bowel sounds are normal. He exhibits no distension. There is no tenderness. There is no rebound and no guarding. Musculoskeletal: He exhibits tenderness. Right shoulder: Normal.        Left shoulder: Normal.        Right wrist: Normal.        Left wrist: Normal.        Right hip: Normal.        Left hip: Normal.        Right knee: Normal.        Left knee: Normal.        Right ankle: Normal.        Left ankle: Normal.        Cervical back: Normal.        Thoracic back: He exhibits tenderness.         Lumbar back: He exhibits decreased range of motion, tenderness, pain and spasm. Back:         Right upper leg: He exhibits tenderness. Left upper leg: He exhibits tenderness. Neurological: He is alert and oriented to person, place, and time. He has normal strength and normal reflexes. He is not disoriented. He displays no atrophy. No cranial nerve deficit or sensory deficit. He exhibits normal muscle tone. He displays a negative Romberg sign. Coordination and gait normal. He displays no Babinski's sign on the right side. SLR-  Motor 5/5 BUE BLE   Skin: Skin is warm. No rash noted. He is not diaphoretic. No erythema. No pallor. Psychiatric: He has a normal mood and affect. His speech is normal and behavior is normal. Judgment and thought content normal. His mood appears not anxious. His affect is not angry, not blunt, not labile and not inappropriate. He is not agitated, not aggressive, not hyperactive, not slowed, not withdrawn, not actively hallucinating and not combative. Thought content is not paranoid and not delusional. Cognition and memory are normal. Cognition and memory are not impaired. He does not express impulsivity or inappropriate judgment. He does not exhibit a depressed mood. He expresses no homicidal and no suicidal ideation. He expresses no suicidal plans and no homicidal plans. He exhibits normal recent memory and normal remote memory. He is attentive. Nursing note and vitals reviewed.     RONDA test: negative  Yeomans test: negative  Ga[place]len test: negative  Assessment:      1. Spinal stenosis of lumbar region without neurogenic claudication    2. Spondylosis of lumbar region without myelopathy or radiculopathy    3. Chronic pain syndrome       Plan:     Risks and procedure reviewed for OSMANI

## 2018-07-26 DIAGNOSIS — G89.4 CHRONIC PAIN SYNDROME: Primary | ICD-10-CM

## 2018-07-26 RX ORDER — TRAMADOL HYDROCHLORIDE 50 MG/1
50 TABLET ORAL EVERY 8 HOURS PRN
Qty: 90 TABLET | Refills: 0 | Status: SHIPPED | OUTPATIENT
Start: 2018-07-26 | End: 2018-08-22 | Stop reason: SDUPTHER

## 2018-08-22 ENCOUNTER — OFFICE VISIT (OUTPATIENT)
Dept: PHYSICAL MEDICINE AND REHAB | Age: 78
End: 2018-08-22
Payer: MEDICARE

## 2018-08-22 VITALS
HEIGHT: 67 IN | BODY MASS INDEX: 31.71 KG/M2 | WEIGHT: 202 LBS | SYSTOLIC BLOOD PRESSURE: 98 MMHG | HEART RATE: 63 BPM | DIASTOLIC BLOOD PRESSURE: 57 MMHG

## 2018-08-22 DIAGNOSIS — G89.4 CHRONIC PAIN SYNDROME: ICD-10-CM

## 2018-08-22 DIAGNOSIS — M47.816 SPONDYLOSIS OF LUMBAR REGION WITHOUT MYELOPATHY OR RADICULOPATHY: ICD-10-CM

## 2018-08-22 DIAGNOSIS — M48.061 SPINAL STENOSIS OF LUMBAR REGION WITHOUT NEUROGENIC CLAUDICATION: ICD-10-CM

## 2018-08-22 DIAGNOSIS — M96.1 FAILED BACK SYNDROME OF LUMBAR SPINE: Primary | ICD-10-CM

## 2018-08-22 PROCEDURE — 99213 OFFICE O/P EST LOW 20 MIN: CPT | Performed by: NURSE PRACTITIONER

## 2018-08-22 RX ORDER — TRAMADOL HYDROCHLORIDE 50 MG/1
50 TABLET ORAL EVERY 8 HOURS PRN
Qty: 90 TABLET | Refills: 0 | Status: SHIPPED | OUTPATIENT
Start: 2018-08-25 | End: 2018-10-01 | Stop reason: SDUPTHER

## 2018-08-22 ASSESSMENT — ENCOUNTER SYMPTOMS
COLOR CHANGE: 0
EYE PAIN: 0
CONSTIPATION: 0
BACK PAIN: 1
PHOTOPHOBIA: 0
RHINORRHEA: 0
SHORTNESS OF BREATH: 0
SORE THROAT: 0
CHEST TIGHTNESS: 0
ABDOMINAL PAIN: 0
DIARRHEA: 0
SINUS PRESSURE: 0
VOMITING: 0
NAUSEA: 0
COUGH: 0
WHEEZING: 0

## 2018-08-22 NOTE — PROGRESS NOTES
scale with out pain medications is 7/10. Pain scale with pain medications is  5/10. Last dose of Tramadol was today  Drug screen reviewed from 5/2/2018 and was appropriate  Pill count was completed today and was appropriate    The patient is allergic to nsaids. Past Medical History  Errol Cowden  has a past medical history of Arthritis; CAD (coronary artery disease); Depression; Diabetes mellitus ([address] GERD (gastroesophageal reflux disease); Hyperlipidemia; and Thyroid disease. Past Surgical History  The patient  has a past surgical history that includes Cholecystectomy; Carpal tunnel release (Right, 1970); Tooth Extraction (1970); Rotator cuff repair (Left, 1998); Retinopathy surgery (Bilateral, 1997); debridement (Left, 12/2003); cardiovascular stress test (08/2015); Colonoscopy; Upper gastrointestinal endoscopy (2012); Nerve Block Lumb Facet Level 1 Bilateral (Bilateral, 11/27/2017); Nerve Surgery (Bilateral, 02/06/2018); pr inj dx/ther agnt paravert facet joint, lumbar/sac, 1st level (Bilateral, 2/6/2018); other surgical history (Right, 03/05/2018); pr nervous system surgery unlisted (Right, 3/5/2018); other surgical history (Left, 03/27/2018); pr nervous system surgery unlisted (Left, 3/27/2018); eye surgery (2005); back surgery (04/1991); Cardiac catheterization (12/2005); Cardiac catheterization (07/11/2018); and pr njx dx/ther sbst intrlmnr lmbr/sac w/img gdn (N/A, 7/19/2018). Family History  This patient's family history is not on file. Social History  Errol Cowden  reports that he has been smoking Pipe and Cigars. He has never used smokeless tobacco. He reports that he does not drink alcohol or use drugs. Medications    Current Outpatient Prescriptions:     [START ON 8/25/2018] traMADol (ULTRAM) 50 MG tablet, Take 1 tablet by mouth every 8 hours as needed for Pain for up to 30 days. ., Disp: 90 tablet, Rfl: 0    levothyroxine (SYNTHROID) 100 MCG tablet, Take 100 mcg by mouth Daily, Disp: , Rfl:    metoprolol succinate (TOPROL XL) 100 MG extended release tablet, Take 100 mg by mouth daily, Disp: , Rfl:     pioglitazone (ACTOS) 30 MG tablet, Take 30 mg by mouth daily, Disp: , Rfl:     rosuvastatin (CRESTOR) 10 MG tablet, Take 10 mg by mouth daily, Disp: , Rfl:     aspirin 81 MG chewable tablet, Take 81 mg by mouth daily, Disp: , Rfl:     amLODIPine (NORVASC) 2.5 MG tablet, Take 2.5 mg by mouth daily, Disp: , Rfl:     donepezil (ARICEPT) 10 MG tablet, Take 10 mg by mouth nightly, Disp: , Rfl:     lansoprazole (PREVACID) 30 MG delayed release capsule, Take 30 mg by mouth daily, Disp: , Rfl:     acetaminophen (TYLENOL) 325 MG tablet, Take 650 mg by mouth daily, Disp: , Rfl:     ferrous sulfate 325 (65 Fe) MG tablet, Take 325 mg by mouth daily (with breakfast), Disp: , Rfl:     Cholecalciferol (VITAMIN D3) 5000 units TABS, Take 5,000 Units by mouth daily, Disp: , Rfl:     ramipril (ALTACE) 5 MG capsule, Take 5 mg by mouth daily. , Disp: , Rfl:     fenofibrate (TRICOR) 145 MG tablet, Take 160 mg by mouth daily , Disp: , Rfl:     clopidogrel (PLAVIX) 75 MG tablet, Take 75 mg by mouth daily. , Disp: , Rfl:     isosorbide mononitrate (IMDUR) 30 MG CR tablet, Take 30 mg by mouth daily. , Disp: , Rfl:     DULoxetine (CYMBALTA) 60 MG capsule, Take 60 mg by mouth daily. , Disp: , Rfl:     insulin NPH (HUMULIN N;NOVOLIN N) 100 UNIT/ML injection, Inject 20 Units into the skin 2 times daily (before meals) , Disp: , Rfl:     insulin regular (HUMULIN R;NOVOLIN R) 100 UNIT/ML injection, Inject 10 Units into the skin 2 times daily (before meals) Between 10-12 bid, Disp: , Rfl:     loperamide (IMODIUM) 2 MG capsule, Take 2 mg by mouth as needed. , Disp: , Rfl:     Multiple Vitamins-Minerals (MULTIVITAMIN & MINERAL PO), Take  by mouth daily. , Disp: , Rfl:     polycarbophil (FIBERCON) 625 MG tablet, Take 625 mg by mouth daily. , Disp: , Rfl:     sucralfate (CARAFATE) 1 GM tablet, Take 1 g by mouth 2 times daily as mouth every 8 hours as needed for Pain for up to 30 days. .     Dispense:  90 tablet     Refill:  0       Return in about 6 weeks (around 10/3/2018), or after Psych eval, for Follow up pain medications.          Electronically signed by DIOGO Wiggins CNP on 8/22/2018 at 1:59 PM

## 2018-09-07 ENCOUNTER — OFFICE VISIT (OUTPATIENT)
Dept: PSYCHIATRY | Age: 78
End: 2018-09-07
Payer: MEDICARE

## 2018-09-07 DIAGNOSIS — G31.84 MCI (MILD COGNITIVE IMPAIRMENT) WITH MEMORY LOSS: ICD-10-CM

## 2018-09-07 DIAGNOSIS — F43.23 ADJUSTMENT DISORDER WITH MIXED ANXIETY AND DEPRESSED MOOD: ICD-10-CM

## 2018-09-07 PROCEDURE — 90792 PSYCH DIAG EVAL W/MED SRVCS: CPT | Performed by: PSYCHIATRY & NEUROLOGY

## 2018-09-13 ENCOUNTER — TELEPHONE (OUTPATIENT)
Dept: PHYSICAL MEDICINE AND REHAB | Age: 78
End: 2018-09-13

## 2018-09-13 DIAGNOSIS — M96.1 FAILED BACK SYNDROME OF LUMBAR SPINE: Primary | ICD-10-CM

## 2018-09-20 ENCOUNTER — TELEPHONE (OUTPATIENT)
Dept: PHYSICAL MEDICINE AND REHAB | Age: 78
End: 2018-09-20

## 2018-10-01 DIAGNOSIS — M47.816 SPONDYLOSIS OF LUMBAR REGION WITHOUT MYELOPATHY OR RADICULOPATHY: ICD-10-CM

## 2018-10-01 DIAGNOSIS — M96.1 FAILED BACK SYNDROME OF LUMBAR SPINE: ICD-10-CM

## 2018-10-01 DIAGNOSIS — M48.061 SPINAL STENOSIS OF LUMBAR REGION WITHOUT NEUROGENIC CLAUDICATION: ICD-10-CM

## 2018-10-01 DIAGNOSIS — G89.4 CHRONIC PAIN SYNDROME: ICD-10-CM

## 2018-10-01 RX ORDER — TRAMADOL HYDROCHLORIDE 50 MG/1
50 TABLET ORAL EVERY 8 HOURS PRN
Qty: 90 TABLET | Refills: 0 | Status: SHIPPED | OUTPATIENT
Start: 2018-10-01 | End: 2018-10-31

## 2018-10-01 NOTE — TELEPHONE ENCOUNTER
OARRS reviewed. UDS: + for tramadol and THC, spoke to patient's wife and she stated \"he took 3 CBD pills but stop taking them after talking to Billie at the appt because she told them his UDS would be positive . Last seen: 08.22.18. Follow-up: awaiting SCS stimulator.

## 2018-10-12 ENCOUNTER — APPOINTMENT (OUTPATIENT)
Dept: GENERAL RADIOLOGY | Age: 78
End: 2018-10-12
Attending: PAIN MEDICINE
Payer: MEDICARE

## 2018-10-12 ENCOUNTER — HOSPITAL ENCOUNTER (OUTPATIENT)
Age: 78
Setting detail: OUTPATIENT SURGERY
Discharge: HOME | End: 2018-10-12
Attending: PAIN MEDICINE | Admitting: PAIN MEDICINE
Payer: MEDICARE

## 2018-10-12 ENCOUNTER — ANESTHESIA (OUTPATIENT)
Dept: OPERATING ROOM | Age: 78
End: 2018-10-12
Payer: MEDICARE

## 2018-10-12 ENCOUNTER — ANESTHESIA EVENT (OUTPATIENT)
Dept: OPERATING ROOM | Age: 78
End: 2018-10-12
Payer: MEDICARE

## 2018-10-12 VITALS
WEIGHT: 194 LBS | HEART RATE: 83 BPM | HEIGHT: 67 IN | RESPIRATION RATE: 16 BRPM | BODY MASS INDEX: 30.45 KG/M2 | SYSTOLIC BLOOD PRESSURE: 102 MMHG | TEMPERATURE: 97.6 F | OXYGEN SATURATION: 97 % | DIASTOLIC BLOOD PRESSURE: 56 MMHG

## 2018-10-12 VITALS
OXYGEN SATURATION: 93 % | DIASTOLIC BLOOD PRESSURE: 57 MMHG | RESPIRATION RATE: 12 BRPM | SYSTOLIC BLOOD PRESSURE: 101 MMHG

## 2018-10-12 LAB — GLUCOSE BLD-MCNC: 210 MG/DL (ref 70–108)

## 2018-10-12 PROCEDURE — 7100000010 HC PHASE II RECOVERY - FIRST 15 MIN: Performed by: PAIN MEDICINE

## 2018-10-12 PROCEDURE — 3600000004 HC SURGERY LEVEL 4 BASE: Performed by: PAIN MEDICINE

## 2018-10-12 PROCEDURE — C1778 LEAD, NEUROSTIMULATOR: HCPCS | Performed by: PAIN MEDICINE

## 2018-10-12 PROCEDURE — 7100000011 HC PHASE II RECOVERY - ADDTL 15 MIN: Performed by: PAIN MEDICINE

## 2018-10-12 PROCEDURE — 2580000003 HC RX 258: Performed by: NURSE ANESTHETIST, CERTIFIED REGISTERED

## 2018-10-12 PROCEDURE — 3700000001 HC ADD 15 MINUTES (ANESTHESIA): Performed by: PAIN MEDICINE

## 2018-10-12 PROCEDURE — 3700000000 HC ANESTHESIA ATTENDED CARE: Performed by: PAIN MEDICINE

## 2018-10-12 PROCEDURE — 2709999900 HC NON-CHARGEABLE SUPPLY: Performed by: PAIN MEDICINE

## 2018-10-12 PROCEDURE — 6360000002 HC RX W HCPCS: Performed by: NURSE ANESTHETIST, CERTIFIED REGISTERED

## 2018-10-12 PROCEDURE — 3600000014 HC SURGERY LEVEL 4 ADDTL 15MIN: Performed by: PAIN MEDICINE

## 2018-10-12 PROCEDURE — 6360000004 HC RX CONTRAST MEDICATION: Performed by: PAIN MEDICINE

## 2018-10-12 PROCEDURE — 2500000003 HC RX 250 WO HCPCS: Performed by: PAIN MEDICINE

## 2018-10-12 PROCEDURE — 82948 REAGENT STRIP/BLOOD GLUCOSE: CPT

## 2018-10-12 PROCEDURE — 2500000003 HC RX 250 WO HCPCS: Performed by: NURSE ANESTHETIST, CERTIFIED REGISTERED

## 2018-10-12 PROCEDURE — 95972 ALYS CPLX SP/PN NPGT W/PRGRM: CPT | Performed by: PAIN MEDICINE

## 2018-10-12 PROCEDURE — 3209999900 FLUORO FOR SURGICAL PROCEDURES

## 2018-10-12 PROCEDURE — 63650 IMPLANT NEUROELECTRODES: CPT | Performed by: PAIN MEDICINE

## 2018-10-12 DEVICE — 50CM 16 CONTACT TRIAL LEAD KIT
Type: IMPLANTABLE DEVICE | Status: FUNCTIONAL
Brand: INFINION™  16

## 2018-10-12 RX ORDER — ONDANSETRON 2 MG/ML
INJECTION INTRAMUSCULAR; INTRAVENOUS PRN
Status: DISCONTINUED | OUTPATIENT
Start: 2018-10-12 | End: 2018-10-12 | Stop reason: SDUPTHER

## 2018-10-12 RX ORDER — FENTANYL CITRATE 50 UG/ML
INJECTION, SOLUTION INTRAMUSCULAR; INTRAVENOUS PRN
Status: DISCONTINUED | OUTPATIENT
Start: 2018-10-12 | End: 2018-10-12 | Stop reason: SDUPTHER

## 2018-10-12 RX ORDER — LIDOCAINE HYDROCHLORIDE 20 MG/ML
INJECTION, SOLUTION INFILTRATION; PERINEURAL PRN
Status: DISCONTINUED | OUTPATIENT
Start: 2018-10-12 | End: 2018-10-12 | Stop reason: HOSPADM

## 2018-10-12 RX ORDER — SODIUM CHLORIDE 9 MG/ML
INJECTION, SOLUTION INTRAVENOUS CONTINUOUS PRN
Status: DISCONTINUED | OUTPATIENT
Start: 2018-10-12 | End: 2018-10-12 | Stop reason: SDUPTHER

## 2018-10-12 RX ORDER — LIDOCAINE HYDROCHLORIDE 20 MG/ML
INJECTION, SOLUTION INFILTRATION; PERINEURAL PRN
Status: DISCONTINUED | OUTPATIENT
Start: 2018-10-12 | End: 2018-10-12 | Stop reason: SDUPTHER

## 2018-10-12 RX ORDER — CEPHALEXIN 500 MG/1
500 CAPSULE ORAL 3 TIMES DAILY
Qty: 21 CAPSULE | Refills: 0 | Status: SHIPPED | OUTPATIENT
Start: 2018-10-12 | End: 2018-10-19

## 2018-10-12 RX ADMIN — LIDOCAINE HYDROCHLORIDE 40 MG: 20 INJECTION, SOLUTION INFILTRATION; PERINEURAL at 10:28

## 2018-10-12 RX ADMIN — SODIUM CHLORIDE: 9 INJECTION, SOLUTION INTRAVENOUS at 10:20

## 2018-10-12 RX ADMIN — ONDANSETRON HYDROCHLORIDE 4 MG: 4 INJECTION, SOLUTION INTRAMUSCULAR; INTRAVENOUS at 10:50

## 2018-10-12 RX ADMIN — PROPOFOL 60 MG: 10 INJECTION, EMULSION INTRAVENOUS at 10:28

## 2018-10-12 RX ADMIN — FENTANYL CITRATE 50 MCG: 50 INJECTION INTRAMUSCULAR; INTRAVENOUS at 10:20

## 2018-10-12 ASSESSMENT — PULMONARY FUNCTION TESTS
PIF_VALUE: 0

## 2018-10-12 ASSESSMENT — PAIN SCALES - GENERAL: PAINLEVEL_OUTOF10: 0

## 2018-10-12 ASSESSMENT — PAIN - FUNCTIONAL ASSESSMENT: PAIN_FUNCTIONAL_ASSESSMENT: 0-10

## 2018-10-12 ASSESSMENT — LIFESTYLE VARIABLES: SMOKING_STATUS: 1

## 2018-10-12 ASSESSMENT — PAIN DESCRIPTION - DESCRIPTORS: DESCRIPTORS: ACHING;CONSTANT

## 2018-10-12 NOTE — ANESTHESIA PRE PROCEDURE
Department of Anesthesiology  Preprocedure Note       Name:  Warner Duverney   Age:  [de-identified] y.o.  :  1940                                          MRN:  [id number]         Date:  10/12/2018      Surgeon: Kurt Ortega):  Loree Tsai MD    Procedure: Procedure(s):  SPINAL CORD STIMULATOR IMPLANT TRIAL    Medications prior to admission:   Prior to Admission medications    Medication Sig Start Date End Date Taking? Authorizing Provider   traMADol (ULTRAM) 50 MG tablet Take 1 tablet by mouth every 8 hours as needed for Pain for up to 30 days. . 10/1/18 10/31/18  Loree Tasi MD   levothyroxine (SYNTHROID) 100 MCG tablet Take 100 mcg by mouth Daily    Historical Provider, MD   metoprolol succinate (TOPROL XL) 100 MG extended release tablet Take 100 mg by mouth daily    Historical Provider, MD   pioglitazone (ACTOS) 30 MG tablet Take 30 mg by mouth daily    Historical Provider, MD   rosuvastatin (CRESTOR) 10 MG tablet Take 10 mg by mouth daily    Historical Provider, MD   aspirin 81 MG chewable tablet Take 81 mg by mouth daily    Historical Provider, MD   amLODIPine (NORVASC) 2.5 MG tablet Take 2.5 mg by mouth daily    Historical Provider, MD   donepezil (ARICEPT) 10 MG tablet Take 10 mg by mouth nightly    Historical Provider, MD   lansoprazole (PREVACID) 30 MG delayed release capsule Take 30 mg by mouth daily    Historical Provider, MD   acetaminophen (TYLENOL) 325 MG tablet Take 650 mg by mouth daily    Historical Provider, MD   ferrous sulfate 325 (65 Fe) MG tablet Take 325 mg by mouth daily (with breakfast)    Historical Provider, MD   Cholecalciferol (VITAMIN D3) 5000 units TABS Take 5,000 Units by mouth daily    Historical Provider, MD   ramipril (ALTACE) 5 MG capsule Take 5 mg by mouth daily. Historical Provider, MD   fenofibrate (TRICOR) 145 MG tablet Take 160 mg by mouth daily     Historical Provider, MD   clopidogrel (PLAVIX) 75 MG tablet Take 75 mg by mouth daily.     Historical Provider, MD

## 2018-10-12 NOTE — OP NOTE
further planning. Patient is asked to call us if he develops any signs of infection are any excessive drainage. SURGEON:  Gilbert Wynne MD  ANESTHESIA:   MAC  COMPLICATIONS:  None. SPECIMENS REMOVED:  None. ANTIBIOTICS:  ONE gram of Ancef. ESTIMATED BLOOD LOSS:  5 mL.      Electronically signed by Jeff Morales MD on 10/12/2018 at 10:58 AM

## 2018-10-12 NOTE — ANESTHESIA POSTPROCEDURE EVALUATION
Department of Anesthesiology  Postprocedure Note    Patient: Mookie Page  MRN: [id number]  YOB: 1940  Date of evaluation: 10/12/2018  Time:  12:15 PM     Procedure Summary     Date:  10/12/18 Room / Location:  [facility] 02 / [address] [facility]    Anesthesia Start:  1020 Anesthesia Stop:  1101    Procedure:  RIGHT SPINAL CORD STIMULATOR IMPLANT TRIAL  ENTRY L1  BASE @T6 (N/A ) Diagnosis:  (LUMBAR SPONDYLOSIS)    Surgeon:  Tierney Yeh MD Responsible Provider:  Serafin Shannon DO    Anesthesia Type:  MAC ASA Status:  3          Anesthesia Type: MAC    Pino Phase I: Pino Score: 10    Pino Phase II: Pino Score: 10    Last vitals: Reviewed and per EMR flowsheets.        Anesthesia Post Evaluation    Patient location during evaluation: bedside  Patient participation: complete - patient participated  Level of consciousness: awake and alert  Pain score: 0  Airway patency: patent  Nausea & Vomiting: no nausea and no vomiting  Complications: no  Cardiovascular status: hemodynamically stable  Respiratory status: spontaneous ventilation, room air and acceptable  Hydration status: stable

## 2018-10-12 NOTE — PROGRESS NOTES
1101  Pt received into Phase 2 recovery via cot, awake, skin warm, pink and dry, connected to monitor, nbp and pulse ox, no c/o pain, or nausea, able to wiggle toes no c/o numbness or tingling  [facility]     1106  Pt requested diet pepsi and pb crackers, wife Shara Armstrong brought to the bedside to sit with pt during recovery phase     1115  [employer] rep in teaching the pt and wife about his new device implanted     [id number]  Rep just finishing with pt, RN removes IV psi held, band aid applied, asst pt onto bedside, pt wants the stimulator turned down, wife attempting to make the adjustment, not able to do so, rep asked to stop back in to help with adjustment, rep turning device down, Dr Tawanda Hamilton in to check on the pt, pt feeling better since adjustment was made, wife helping pt get dressed     1  RN reviews discharge instructions with pt and wife,  Send wife to go get the car, she takes the stimulator box and remote with her     1200  RN escorts pt to private car, discharge criteria met

## 2018-10-18 ENCOUNTER — OFFICE VISIT (OUTPATIENT)
Dept: PHYSICAL MEDICINE AND REHAB | Age: 78
End: 2018-10-18

## 2018-10-18 VITALS
BODY MASS INDEX: 30.45 KG/M2 | DIASTOLIC BLOOD PRESSURE: 56 MMHG | HEART RATE: 72 BPM | SYSTOLIC BLOOD PRESSURE: 109 MMHG | HEIGHT: 67 IN | WEIGHT: 194 LBS

## 2018-10-18 DIAGNOSIS — G89.4 CHRONIC PAIN SYNDROME: ICD-10-CM

## 2018-10-18 DIAGNOSIS — M47.816 SPONDYLOSIS OF LUMBAR REGION WITHOUT MYELOPATHY OR RADICULOPATHY: ICD-10-CM

## 2018-10-18 DIAGNOSIS — M48.061 SPINAL STENOSIS OF LUMBAR REGION WITHOUT NEUROGENIC CLAUDICATION: ICD-10-CM

## 2018-10-18 DIAGNOSIS — M96.1 FAILED BACK SYNDROME OF LUMBAR SPINE: Primary | ICD-10-CM

## 2018-10-18 PROCEDURE — 99024 POSTOP FOLLOW-UP VISIT: CPT | Performed by: NURSE PRACTITIONER

## 2018-10-18 ASSESSMENT — ENCOUNTER SYMPTOMS
SINUS PRESSURE: 0
WHEEZING: 0
COLOR CHANGE: 0
CHEST TIGHTNESS: 0
VOMITING: 0
NAUSEA: 0
SHORTNESS OF BREATH: 0
BACK PAIN: 1
ABDOMINAL PAIN: 0
DIARRHEA: 0
COUGH: 0
PHOTOPHOBIA: 0
EYE PAIN: 0
RHINORRHEA: 0
SORE THROAT: 0
CONSTIPATION: 0

## 2018-10-29 ENCOUNTER — OFFICE VISIT (OUTPATIENT)
Dept: NEUROSURGERY | Age: 78
End: 2018-10-29
Payer: MEDICARE

## 2018-10-29 VITALS
BODY MASS INDEX: 30.92 KG/M2 | SYSTOLIC BLOOD PRESSURE: 102 MMHG | HEIGHT: 67 IN | DIASTOLIC BLOOD PRESSURE: 59 MMHG | WEIGHT: 197 LBS | HEART RATE: 72 BPM

## 2018-10-29 DIAGNOSIS — M96.1 POST LAMINECTOMY SYNDROME: ICD-10-CM

## 2018-10-29 DIAGNOSIS — Q76.49 SPINE DEFORMITY: ICD-10-CM

## 2018-10-29 DIAGNOSIS — M79.2 NEUROPATHIC PAIN OF FOOT, UNSPECIFIED LATERALITY: ICD-10-CM

## 2018-10-29 DIAGNOSIS — G89.4 CHRONIC PAIN DISORDER: ICD-10-CM

## 2018-10-29 DIAGNOSIS — M96.1 FAILED BACK SYNDROME: Primary | ICD-10-CM

## 2018-10-29 PROCEDURE — 99204 OFFICE O/P NEW MOD 45 MIN: CPT | Performed by: NEUROLOGICAL SURGERY

## 2018-10-29 ASSESSMENT — ENCOUNTER SYMPTOMS
TROUBLE SWALLOWING: 0
ABDOMINAL PAIN: 0
CHEST TIGHTNESS: 0
BACK PAIN: 1

## 2018-11-09 ENCOUNTER — TELEPHONE (OUTPATIENT)
Dept: NEUROSURGERY | Age: 78
End: 2018-11-09

## 2018-11-14 ENCOUNTER — TELEPHONE (OUTPATIENT)
Dept: NEUROSURGERY | Age: 78
End: 2018-11-14

## 2018-11-14 DIAGNOSIS — M96.1 FAILED BACK SYNDROME: Primary | ICD-10-CM

## 2018-11-14 DIAGNOSIS — G89.4 CHRONIC PAIN SYNDROME: ICD-10-CM

## 2018-11-14 DIAGNOSIS — Z01.818 PRE-OP TESTING: ICD-10-CM

## 2018-11-14 DIAGNOSIS — M96.1 POST LAMINECTOMY SYNDROME: ICD-10-CM

## 2018-11-26 DIAGNOSIS — G89.4 CHRONIC PAIN SYNDROME: Primary | ICD-10-CM

## 2018-11-27 RX ORDER — TRAMADOL HYDROCHLORIDE 50 MG/1
50 TABLET ORAL EVERY 8 HOURS PRN
Qty: 90 TABLET | Refills: 0 | Status: SHIPPED | OUTPATIENT
Start: 2018-11-27 | End: 2018-12-27

## 2018-11-30 ENCOUNTER — HOSPITAL ENCOUNTER (OUTPATIENT)
Age: 78
Discharge: HOME | End: 2018-11-30
Payer: MEDICARE

## 2018-11-30 ENCOUNTER — HOSPITAL ENCOUNTER (OUTPATIENT)
Dept: GENERAL RADIOLOGY | Age: 78
Discharge: HOME | End: 2018-11-30
Payer: MEDICARE

## 2018-11-30 DIAGNOSIS — G89.4 CHRONIC PAIN SYNDROME: ICD-10-CM

## 2018-11-30 DIAGNOSIS — Z01.818 PRE-OP TESTING: ICD-10-CM

## 2018-11-30 DIAGNOSIS — M96.1 FAILED BACK SYNDROME: ICD-10-CM

## 2018-11-30 DIAGNOSIS — M96.1 POST LAMINECTOMY SYNDROME: ICD-10-CM

## 2018-11-30 LAB
ALBUMIN SERPL-MCNC: 3.9 G/DL (ref 3.5–5.1)
ALP BLD-CCNC: 55 U/L (ref 38–126)
ALT SERPL-CCNC: 10 U/L (ref 11–66)
ANION GAP SERPL CALCULATED.3IONS-SCNC: 12 MEQ/L (ref 8–16)
APTT: 29.6 SECONDS (ref 22–38)
AST SERPL-CCNC: 15 U/L (ref 5–40)
BASOPHILS # BLD: 0.7 %
BASOPHILS ABSOLUTE: 0 THOU/MM3 (ref 0–0.1)
BILIRUB SERPL-MCNC: 0.4 MG/DL (ref 0.3–1.2)
BUN BLDV-MCNC: 29 MG/DL (ref 7–22)
CALCIUM SERPL-MCNC: 9.5 MG/DL (ref 8.5–10.5)
CHLORIDE BLD-SCNC: 103 MEQ/L (ref 98–111)
CO2: 25 MEQ/L (ref 23–33)
CREAT SERPL-MCNC: 1 MG/DL (ref 0.4–1.2)
EOSINOPHIL # BLD: 4.6 %
EOSINOPHILS ABSOLUTE: 0.3 THOU/MM3 (ref 0–0.4)
ERYTHROCYTE [DISTWIDTH] IN BLOOD BY AUTOMATED COUNT: 13.9 % (ref 11.5–14.5)
ERYTHROCYTE [DISTWIDTH] IN BLOOD BY AUTOMATED COUNT: 48 FL (ref 35–45)
GFR SERPL CREATININE-BSD FRML MDRD: 72 ML/MIN/1.73M2
GLUCOSE BLD-MCNC: 156 MG/DL (ref 70–108)
HCT VFR BLD CALC: 37.7 % (ref 42–52)
HEMOGLOBIN: 12.3 GM/DL (ref 14–18)
IMMATURE GRANS (ABS): 0.03 THOU/MM3 (ref 0–0.07)
IMMATURE GRANULOCYTES: 0.5 %
INR BLD: 0.87 (ref 0.85–1.13)
LYMPHOCYTES # BLD: 36.7 %
LYMPHOCYTES ABSOLUTE: 2.2 THOU/MM3 (ref 1–4.8)
MCH RBC QN AUTO: 30.8 PG (ref 26–33)
MCHC RBC AUTO-ENTMCNC: 32.6 GM/DL (ref 32.2–35.5)
MCV RBC AUTO: 94.3 FL (ref 80–94)
MONOCYTES # BLD: 10 %
MONOCYTES ABSOLUTE: 0.6 THOU/MM3 (ref 0.4–1.3)
NUCLEATED RED BLOOD CELLS: 0 /100 WBC
PLATELET # BLD: 217 THOU/MM3 (ref 130–400)
PMV BLD AUTO: 11.4 FL (ref 9.4–12.4)
POTASSIUM SERPL-SCNC: 4.9 MEQ/L (ref 3.5–5.2)
RBC # BLD: 4 MILL/MM3 (ref 4.7–6.1)
SEG NEUTROPHILS: 47.5 %
SEGMENTED NEUTROPHILS ABSOLUTE COUNT: 2.8 THOU/MM3 (ref 1.8–7.7)
SODIUM BLD-SCNC: 140 MEQ/L (ref 135–145)
TOTAL PROTEIN: 5.9 G/DL (ref 6.1–8)
WBC # BLD: 5.9 THOU/MM3 (ref 4.8–10.8)

## 2018-11-30 PROCEDURE — 80053 COMPREHEN METABOLIC PANEL: CPT

## 2018-11-30 PROCEDURE — 71046 X-RAY EXAM CHEST 2 VIEWS: CPT

## 2018-11-30 PROCEDURE — 85025 COMPLETE CBC W/AUTO DIFF WBC: CPT

## 2018-11-30 PROCEDURE — 36415 COLL VENOUS BLD VENIPUNCTURE: CPT

## 2018-11-30 PROCEDURE — 85730 THROMBOPLASTIN TIME PARTIAL: CPT

## 2018-11-30 PROCEDURE — 85610 PROTHROMBIN TIME: CPT

## 2018-11-30 RX ORDER — PIOGLITAZONEHYDROCHLORIDE 15 MG/1
15 TABLET ORAL DAILY
COMMUNITY

## 2018-11-30 RX ORDER — ACETAMINOPHEN 500 MG
500 TABLET ORAL DAILY
COMMUNITY

## 2018-11-30 RX ORDER — CLOPIDOGREL BISULFATE 75 MG/1
75 TABLET ORAL DAILY
Status: ON HOLD | COMMUNITY
End: 2018-12-08 | Stop reason: HOSPADM

## 2018-11-30 RX ORDER — FENOFIBRATE 160 MG/1
160 TABLET ORAL DAILY
Status: ON HOLD | COMMUNITY
End: 2020-12-04 | Stop reason: HOSPADM

## 2018-11-30 RX ORDER — LEVOTHYROXINE SODIUM 0.07 MG/1
75 TABLET ORAL DAILY
COMMUNITY

## 2018-12-07 ENCOUNTER — ANESTHESIA EVENT (OUTPATIENT)
Dept: OPERATING ROOM | Age: 78
End: 2018-12-07
Payer: MEDICARE

## 2018-12-07 ENCOUNTER — ANESTHESIA (OUTPATIENT)
Dept: OPERATING ROOM | Age: 78
End: 2018-12-07
Payer: MEDICARE

## 2018-12-07 ENCOUNTER — PREP FOR PROCEDURE (OUTPATIENT)
Dept: NEUROSURGERY | Age: 78
End: 2018-12-07

## 2018-12-07 ENCOUNTER — APPOINTMENT (OUTPATIENT)
Dept: GENERAL RADIOLOGY | Age: 78
End: 2018-12-07
Attending: NEUROLOGICAL SURGERY
Payer: MEDICARE

## 2018-12-07 ENCOUNTER — HOSPITAL ENCOUNTER (OUTPATIENT)
Age: 78
Discharge: HOME | End: 2018-12-08
Attending: NEUROLOGICAL SURGERY | Admitting: NEUROLOGICAL SURGERY
Payer: MEDICARE

## 2018-12-07 VITALS
SYSTOLIC BLOOD PRESSURE: 112 MMHG | TEMPERATURE: 97.7 F | RESPIRATION RATE: 2 BRPM | DIASTOLIC BLOOD PRESSURE: 57 MMHG | OXYGEN SATURATION: 100 %

## 2018-12-07 PROBLEM — M96.1 POST LAMINECTOMY SYNDROME: Status: ACTIVE | Noted: 2018-12-07

## 2018-12-07 LAB
ABO: NORMAL
ANTIBODY SCREEN: NORMAL
GLUCOSE BLD-MCNC: 207 MG/DL (ref 70–108)
GLUCOSE BLD-MCNC: 216 MG/DL (ref 70–108)
GLUCOSE BLD-MCNC: 220 MG/DL (ref 70–108)
GLUCOSE BLD-MCNC: 252 MG/DL (ref 70–108)
RH FACTOR: NORMAL

## 2018-12-07 PROCEDURE — 2500000003 HC RX 250 WO HCPCS: Performed by: NEUROLOGICAL SURGERY

## 2018-12-07 PROCEDURE — 3209999900 FLUORO FOR SURGICAL PROCEDURES

## 2018-12-07 PROCEDURE — 63655 IMPLANT NEUROELECTRODES: CPT | Performed by: NEUROLOGICAL SURGERY

## 2018-12-07 PROCEDURE — 2700000000 HC OXYGEN THERAPY PER DAY

## 2018-12-07 PROCEDURE — C1778 LEAD, NEUROSTIMULATOR: HCPCS | Performed by: NEUROLOGICAL SURGERY

## 2018-12-07 PROCEDURE — 2720000010 HC SURG SUPPLY STERILE: Performed by: NEUROLOGICAL SURGERY

## 2018-12-07 PROCEDURE — 6360000002 HC RX W HCPCS: Performed by: REGISTERED NURSE

## 2018-12-07 PROCEDURE — 63685 INS/RPLC SPI NPG/RCVR POCKET: CPT | Performed by: NEUROLOGICAL SURGERY

## 2018-12-07 PROCEDURE — 3700000000 HC ANESTHESIA ATTENDED CARE: Performed by: NEUROLOGICAL SURGERY

## 2018-12-07 PROCEDURE — 82948 REAGENT STRIP/BLOOD GLUCOSE: CPT

## 2018-12-07 PROCEDURE — 86900 BLOOD TYPING SEROLOGIC ABO: CPT

## 2018-12-07 PROCEDURE — 7100000000 HC PACU RECOVERY - FIRST 15 MIN: Performed by: NEUROLOGICAL SURGERY

## 2018-12-07 PROCEDURE — 6360000002 HC RX W HCPCS: Performed by: PHYSICIAN ASSISTANT

## 2018-12-07 PROCEDURE — 2500000003 HC RX 250 WO HCPCS: Performed by: REGISTERED NURSE

## 2018-12-07 PROCEDURE — 86850 RBC ANTIBODY SCREEN: CPT

## 2018-12-07 PROCEDURE — C1820 GENERATOR NEURO RECHG BAT SY: HCPCS | Performed by: NEUROLOGICAL SURGERY

## 2018-12-07 PROCEDURE — 3600000014 HC SURGERY LEVEL 4 ADDTL 15MIN: Performed by: NEUROLOGICAL SURGERY

## 2018-12-07 PROCEDURE — 3600000004 HC SURGERY LEVEL 4 BASE: Performed by: NEUROLOGICAL SURGERY

## 2018-12-07 PROCEDURE — 2580000003 HC RX 258: Performed by: NEUROLOGICAL SURGERY

## 2018-12-07 PROCEDURE — 72020 X-RAY EXAM OF SPINE 1 VIEW: CPT

## 2018-12-07 PROCEDURE — 7100000001 HC PACU RECOVERY - ADDTL 15 MIN: Performed by: NEUROLOGICAL SURGERY

## 2018-12-07 PROCEDURE — C1713 ANCHOR/SCREW BN/BN,TIS/BN: HCPCS | Performed by: NEUROLOGICAL SURGERY

## 2018-12-07 PROCEDURE — 2709999900 HC NON-CHARGEABLE SUPPLY

## 2018-12-07 PROCEDURE — 2709999900 HC NON-CHARGEABLE SUPPLY: Performed by: NEUROLOGICAL SURGERY

## 2018-12-07 PROCEDURE — 6370000000 HC RX 637 (ALT 250 FOR IP): Performed by: PHYSICIAN ASSISTANT

## 2018-12-07 PROCEDURE — 3700000001 HC ADD 15 MINUTES (ANESTHESIA): Performed by: NEUROLOGICAL SURGERY

## 2018-12-07 PROCEDURE — C1787 PATIENT PROGR, NEUROSTIM: HCPCS | Performed by: NEUROLOGICAL SURGERY

## 2018-12-07 PROCEDURE — 2780000010 HC IMPLANT OTHER: Performed by: NEUROLOGICAL SURGERY

## 2018-12-07 PROCEDURE — 86901 BLOOD TYPING SEROLOGIC RH(D): CPT

## 2018-12-07 PROCEDURE — 2580000003 HC RX 258: Performed by: PHYSICIAN ASSISTANT

## 2018-12-07 PROCEDURE — 6360000002 HC RX W HCPCS: Performed by: NEUROLOGICAL SURGERY

## 2018-12-07 PROCEDURE — 36415 COLL VENOUS BLD VENIPUNCTURE: CPT

## 2018-12-07 PROCEDURE — 2580000003 HC RX 258: Performed by: REGISTERED NURSE

## 2018-12-07 DEVICE — 50CM 4X8 SURGICAL LEAD KIT
Type: IMPLANTABLE DEVICE | Status: FUNCTIONAL
Brand: COVEREDGE™ 32

## 2018-12-07 DEVICE — Z DUP USE 2138772 BAND ANCHR FIX TISS ANULEX 201: Type: IMPLANTABLE DEVICE | Status: FUNCTIONAL

## 2018-12-07 DEVICE — ANCHOR
Type: IMPLANTABLE DEVICE | Status: FUNCTIONAL
Brand: CLICK™

## 2018-12-07 DEVICE — PADDLE BLANK FOR COVEREDGE ™ 32 SURGICAL LEAD: Type: IMPLANTABLE DEVICE | Status: FUNCTIONAL

## 2018-12-07 DEVICE — IMPLANTABLE PULSE GENERATOR KIT
Type: IMPLANTABLE DEVICE | Status: FUNCTIONAL
Brand: SPECTRA WAVEWRITER™

## 2018-12-07 DEVICE — FLOSEAL HEMOSTATIC MATRIX, 5 ML
Type: IMPLANTABLE DEVICE | Status: FUNCTIONAL
Brand: FLOSEAL

## 2018-12-07 RX ORDER — ROSUVASTATIN CALCIUM 10 MG/1
10 TABLET, COATED ORAL DAILY
Status: DISCONTINUED | OUTPATIENT
Start: 2018-12-07 | End: 2018-12-08 | Stop reason: HOSPADM

## 2018-12-07 RX ORDER — METOCLOPRAMIDE HYDROCHLORIDE 5 MG/ML
INJECTION INTRAMUSCULAR; INTRAVENOUS PRN
Status: DISCONTINUED | OUTPATIENT
Start: 2018-12-07 | End: 2018-12-07 | Stop reason: SDUPTHER

## 2018-12-07 RX ORDER — LEVOTHYROXINE SODIUM 0.07 MG/1
75 TABLET ORAL DAILY
Status: DISCONTINUED | OUTPATIENT
Start: 2018-12-07 | End: 2018-12-08 | Stop reason: HOSPADM

## 2018-12-07 RX ORDER — VASOPRESSIN 20 U/ML
INJECTION PARENTERAL PRN
Status: DISCONTINUED | OUTPATIENT
Start: 2018-12-07 | End: 2018-12-07 | Stop reason: SDUPTHER

## 2018-12-07 RX ORDER — HYDRALAZINE HYDROCHLORIDE 20 MG/ML
5 INJECTION INTRAMUSCULAR; INTRAVENOUS EVERY 10 MIN PRN
Status: DISCONTINUED | OUTPATIENT
Start: 2018-12-07 | End: 2018-12-07 | Stop reason: HOSPADM

## 2018-12-07 RX ORDER — FENTANYL CITRATE 50 UG/ML
INJECTION, SOLUTION INTRAMUSCULAR; INTRAVENOUS PRN
Status: DISCONTINUED | OUTPATIENT
Start: 2018-12-07 | End: 2018-12-07 | Stop reason: SDUPTHER

## 2018-12-07 RX ORDER — DEXTROSE MONOHYDRATE 50 MG/ML
100 INJECTION, SOLUTION INTRAVENOUS PRN
Status: DISCONTINUED | OUTPATIENT
Start: 2018-12-07 | End: 2018-12-07 | Stop reason: SDUPTHER

## 2018-12-07 RX ORDER — CYCLOBENZAPRINE HCL 10 MG
10 TABLET ORAL 3 TIMES DAILY PRN
Status: DISCONTINUED | OUTPATIENT
Start: 2018-12-07 | End: 2018-12-08 | Stop reason: HOSPADM

## 2018-12-07 RX ORDER — DIPHENHYDRAMINE HYDROCHLORIDE 50 MG/ML
12.5 INJECTION INTRAMUSCULAR; INTRAVENOUS
Status: DISCONTINUED | OUTPATIENT
Start: 2018-12-07 | End: 2018-12-07 | Stop reason: HOSPADM

## 2018-12-07 RX ORDER — DONEPEZIL HYDROCHLORIDE 10 MG/1
10 TABLET, FILM COATED ORAL NIGHTLY
Status: DISCONTINUED | OUTPATIENT
Start: 2018-12-07 | End: 2018-12-08 | Stop reason: HOSPADM

## 2018-12-07 RX ORDER — SODIUM CHLORIDE, SODIUM LACTATE, POTASSIUM CHLORIDE, CALCIUM CHLORIDE 600; 310; 30; 20 MG/100ML; MG/100ML; MG/100ML; MG/100ML
INJECTION, SOLUTION INTRAVENOUS CONTINUOUS PRN
Status: DISCONTINUED | OUTPATIENT
Start: 2018-12-07 | End: 2018-12-07 | Stop reason: SDUPTHER

## 2018-12-07 RX ORDER — BACITRACIN 50000 [USP'U]/1
INJECTION, POWDER, LYOPHILIZED, FOR SOLUTION INTRAMUSCULAR PRN
Status: DISCONTINUED | OUTPATIENT
Start: 2018-12-07 | End: 2018-12-07 | Stop reason: HOSPADM

## 2018-12-07 RX ORDER — FENTANYL CITRATE 50 UG/ML
50 INJECTION, SOLUTION INTRAMUSCULAR; INTRAVENOUS EVERY 5 MIN PRN
Status: DISCONTINUED | OUTPATIENT
Start: 2018-12-07 | End: 2018-12-07 | Stop reason: HOSPADM

## 2018-12-07 RX ORDER — GLYCOPYRROLATE 1 MG/5 ML
SYRINGE (ML) INTRAVENOUS PRN
Status: DISCONTINUED | OUTPATIENT
Start: 2018-12-07 | End: 2018-12-07 | Stop reason: SDUPTHER

## 2018-12-07 RX ORDER — SODIUM CHLORIDE 9 MG/ML
INJECTION, SOLUTION INTRAVENOUS CONTINUOUS
Status: DISCONTINUED | OUTPATIENT
Start: 2018-12-07 | End: 2018-12-08 | Stop reason: HOSPADM

## 2018-12-07 RX ORDER — DEXTROSE MONOHYDRATE 50 MG/ML
100 INJECTION, SOLUTION INTRAVENOUS PRN
Status: DISCONTINUED | OUTPATIENT
Start: 2018-12-07 | End: 2018-12-08 | Stop reason: HOSPADM

## 2018-12-07 RX ORDER — PIOGLITAZONEHYDROCHLORIDE 15 MG/1
15 TABLET ORAL DAILY
Status: DISCONTINUED | OUTPATIENT
Start: 2018-12-07 | End: 2018-12-08 | Stop reason: HOSPADM

## 2018-12-07 RX ORDER — DOCUSATE SODIUM 100 MG/1
100 CAPSULE, LIQUID FILLED ORAL 2 TIMES DAILY
Status: DISCONTINUED | OUTPATIENT
Start: 2018-12-07 | End: 2018-12-08 | Stop reason: HOSPADM

## 2018-12-07 RX ORDER — ONDANSETRON 2 MG/ML
4 INJECTION INTRAMUSCULAR; INTRAVENOUS EVERY 6 HOURS PRN
Status: DISCONTINUED | OUTPATIENT
Start: 2018-12-07 | End: 2018-12-08 | Stop reason: HOSPADM

## 2018-12-07 RX ORDER — HYDROCODONE BITARTRATE AND ACETAMINOPHEN 5; 325 MG/1; MG/1
1 TABLET ORAL EVERY 6 HOURS PRN
Status: DISCONTINUED | OUTPATIENT
Start: 2018-12-07 | End: 2018-12-08 | Stop reason: HOSPADM

## 2018-12-07 RX ORDER — MORPHINE SULFATE 2 MG/ML
2 INJECTION, SOLUTION INTRAMUSCULAR; INTRAVENOUS EVERY 5 MIN PRN
Status: DISCONTINUED | OUTPATIENT
Start: 2018-12-07 | End: 2018-12-07 | Stop reason: HOSPADM

## 2018-12-07 RX ORDER — METOPROLOL SUCCINATE 100 MG/1
100 TABLET, EXTENDED RELEASE ORAL DAILY
Status: DISCONTINUED | OUTPATIENT
Start: 2018-12-08 | End: 2018-12-08 | Stop reason: HOSPADM

## 2018-12-07 RX ORDER — SODIUM CHLORIDE 0.9 % (FLUSH) 0.9 %
10 SYRINGE (ML) INJECTION PRN
Status: DISCONTINUED | OUTPATIENT
Start: 2018-12-07 | End: 2018-12-08 | Stop reason: HOSPADM

## 2018-12-07 RX ORDER — RAMIPRIL 5 MG/1
5 CAPSULE ORAL DAILY
Status: DISCONTINUED | OUTPATIENT
Start: 2018-12-08 | End: 2018-12-08 | Stop reason: HOSPADM

## 2018-12-07 RX ORDER — DULOXETIN HYDROCHLORIDE 60 MG/1
60 CAPSULE, DELAYED RELEASE ORAL DAILY
Status: DISCONTINUED | OUTPATIENT
Start: 2018-12-07 | End: 2018-12-08 | Stop reason: HOSPADM

## 2018-12-07 RX ORDER — NICOTINE POLACRILEX 4 MG
15 LOZENGE BUCCAL PRN
Status: DISCONTINUED | OUTPATIENT
Start: 2018-12-07 | End: 2018-12-07 | Stop reason: SDUPTHER

## 2018-12-07 RX ORDER — SUCCINYLCHOLINE CHLORIDE 20 MG/ML
INJECTION INTRAMUSCULAR; INTRAVENOUS PRN
Status: DISCONTINUED | OUTPATIENT
Start: 2018-12-07 | End: 2018-12-07 | Stop reason: SDUPTHER

## 2018-12-07 RX ORDER — FENOFIBRATE 160 MG/1
160 TABLET ORAL DAILY
Status: DISCONTINUED | OUTPATIENT
Start: 2018-12-07 | End: 2018-12-08 | Stop reason: HOSPADM

## 2018-12-07 RX ORDER — SODIUM CHLORIDE 9 MG/ML
INJECTION, SOLUTION INTRAVENOUS CONTINUOUS
Status: DISCONTINUED | OUTPATIENT
Start: 2018-12-07 | End: 2018-12-07

## 2018-12-07 RX ORDER — DEXTROSE MONOHYDRATE 25 G/50ML
12.5 INJECTION, SOLUTION INTRAVENOUS PRN
Status: DISCONTINUED | OUTPATIENT
Start: 2018-12-07 | End: 2018-12-08 | Stop reason: HOSPADM

## 2018-12-07 RX ORDER — NEOSTIGMINE METHYLSULFATE 1 MG/ML
INJECTION, SOLUTION INTRAVENOUS PRN
Status: DISCONTINUED | OUTPATIENT
Start: 2018-12-07 | End: 2018-12-07 | Stop reason: SDUPTHER

## 2018-12-07 RX ORDER — EPHEDRINE SULFATE 50 MG/ML
INJECTION INTRAVENOUS PRN
Status: DISCONTINUED | OUTPATIENT
Start: 2018-12-07 | End: 2018-12-07 | Stop reason: SDUPTHER

## 2018-12-07 RX ORDER — ONDANSETRON 2 MG/ML
4 INJECTION INTRAMUSCULAR; INTRAVENOUS
Status: DISCONTINUED | OUTPATIENT
Start: 2018-12-07 | End: 2018-12-07 | Stop reason: HOSPADM

## 2018-12-07 RX ORDER — MORPHINE SULFATE 2 MG/ML
2 INJECTION, SOLUTION INTRAMUSCULAR; INTRAVENOUS
Status: DISCONTINUED | OUTPATIENT
Start: 2018-12-07 | End: 2018-12-08 | Stop reason: HOSPADM

## 2018-12-07 RX ORDER — NICOTINE POLACRILEX 4 MG
15 LOZENGE BUCCAL PRN
Status: DISCONTINUED | OUTPATIENT
Start: 2018-12-07 | End: 2018-12-08 | Stop reason: HOSPADM

## 2018-12-07 RX ORDER — LIDOCAINE HYDROCHLORIDE 20 MG/ML
INJECTION, SOLUTION INFILTRATION; PERINEURAL PRN
Status: DISCONTINUED | OUTPATIENT
Start: 2018-12-07 | End: 2018-12-07 | Stop reason: SDUPTHER

## 2018-12-07 RX ORDER — DEXTROSE MONOHYDRATE 25 G/50ML
12.5 INJECTION, SOLUTION INTRAVENOUS PRN
Status: DISCONTINUED | OUTPATIENT
Start: 2018-12-07 | End: 2018-12-07 | Stop reason: SDUPTHER

## 2018-12-07 RX ORDER — MEPERIDINE HYDROCHLORIDE 25 MG/ML
12.5 INJECTION INTRAMUSCULAR; INTRAVENOUS; SUBCUTANEOUS EVERY 5 MIN PRN
Status: DISCONTINUED | OUTPATIENT
Start: 2018-12-07 | End: 2018-12-07 | Stop reason: HOSPADM

## 2018-12-07 RX ORDER — ISOSORBIDE MONONITRATE 30 MG/1
30 TABLET, EXTENDED RELEASE ORAL DAILY
Status: DISCONTINUED | OUTPATIENT
Start: 2018-12-08 | End: 2018-12-08 | Stop reason: HOSPADM

## 2018-12-07 RX ORDER — ACETAMINOPHEN 325 MG/1
650 TABLET ORAL EVERY 4 HOURS PRN
Status: DISCONTINUED | OUTPATIENT
Start: 2018-12-07 | End: 2018-12-08 | Stop reason: HOSPADM

## 2018-12-07 RX ORDER — AMLODIPINE BESYLATE 2.5 MG/1
2.5 TABLET ORAL DAILY
Status: DISCONTINUED | OUTPATIENT
Start: 2018-12-08 | End: 2018-12-08 | Stop reason: HOSPADM

## 2018-12-07 RX ORDER — ROCURONIUM BROMIDE 10 MG/ML
INJECTION, SOLUTION INTRAVENOUS PRN
Status: DISCONTINUED | OUTPATIENT
Start: 2018-12-07 | End: 2018-12-07 | Stop reason: SDUPTHER

## 2018-12-07 RX ORDER — HYDROMORPHONE HCL 110MG/55ML
PATIENT CONTROLLED ANALGESIA SYRINGE INTRAVENOUS PRN
Status: DISCONTINUED | OUTPATIENT
Start: 2018-12-07 | End: 2018-12-07 | Stop reason: SDUPTHER

## 2018-12-07 RX ORDER — ETOMIDATE 2 MG/ML
INJECTION INTRAVENOUS PRN
Status: DISCONTINUED | OUTPATIENT
Start: 2018-12-07 | End: 2018-12-07 | Stop reason: SDUPTHER

## 2018-12-07 RX ORDER — KETAMINE HYDROCHLORIDE 50 MG/ML
INJECTION, SOLUTION, CONCENTRATE INTRAMUSCULAR; INTRAVENOUS PRN
Status: DISCONTINUED | OUTPATIENT
Start: 2018-12-07 | End: 2018-12-07 | Stop reason: SDUPTHER

## 2018-12-07 RX ORDER — LABETALOL HYDROCHLORIDE 5 MG/ML
5 INJECTION, SOLUTION INTRAVENOUS EVERY 5 MIN PRN
Status: DISCONTINUED | OUTPATIENT
Start: 2018-12-07 | End: 2018-12-07 | Stop reason: HOSPADM

## 2018-12-07 RX ORDER — ONDANSETRON 2 MG/ML
INJECTION INTRAMUSCULAR; INTRAVENOUS PRN
Status: DISCONTINUED | OUTPATIENT
Start: 2018-12-07 | End: 2018-12-07 | Stop reason: SDUPTHER

## 2018-12-07 RX ORDER — SODIUM CHLORIDE 0.9 % (FLUSH) 0.9 %
10 SYRINGE (ML) INJECTION EVERY 12 HOURS SCHEDULED
Status: DISCONTINUED | OUTPATIENT
Start: 2018-12-07 | End: 2018-12-08 | Stop reason: HOSPADM

## 2018-12-07 RX ORDER — MORPHINE SULFATE 4 MG/ML
4 INJECTION, SOLUTION INTRAMUSCULAR; INTRAVENOUS
Status: DISCONTINUED | OUTPATIENT
Start: 2018-12-07 | End: 2018-12-08 | Stop reason: HOSPADM

## 2018-12-07 RX ADMIN — Medication 5 UNITS: at 18:12

## 2018-12-07 RX ADMIN — EPHEDRINE SULFATE 10 MG: 50 INJECTION, SOLUTION INTRAVENOUS at 12:03

## 2018-12-07 RX ADMIN — DOCUSATE SODIUM 100 MG: 100 CAPSULE, LIQUID FILLED ORAL at 20:57

## 2018-12-07 RX ADMIN — DONEPEZIL HYDROCHLORIDE 10 MG: 10 TABLET, FILM COATED ORAL at 20:56

## 2018-12-07 RX ADMIN — DULOXETINE HYDROCHLORIDE 60 MG: 60 CAPSULE, DELAYED RELEASE ORAL at 16:53

## 2018-12-07 RX ADMIN — EPHEDRINE SULFATE 10 MG: 50 INJECTION, SOLUTION INTRAVENOUS at 11:26

## 2018-12-07 RX ADMIN — HYDROCODONE BITARTRATE AND ACETAMINOPHEN 1 TABLET: 5; 325 TABLET ORAL at 15:50

## 2018-12-07 RX ADMIN — NEOSTIGMINE METHYLSULFATE 4 MG: 1 INJECTION, SOLUTION INTRAVENOUS at 12:40

## 2018-12-07 RX ADMIN — VASOPRESSIN 2 UNITS: 20 INJECTION INTRAVENOUS at 13:16

## 2018-12-07 RX ADMIN — PHENYLEPHRINE HYDROCHLORIDE 100 MCG: 10 INJECTION INTRAVENOUS at 12:12

## 2018-12-07 RX ADMIN — MORPHINE SULFATE 2 MG: 2 INJECTION, SOLUTION INTRAMUSCULAR; INTRAVENOUS at 23:26

## 2018-12-07 RX ADMIN — HYDROMORPHONE HYDROCHLORIDE 0.8 MG: 2 INJECTION INTRAMUSCULAR; INTRAVENOUS; SUBCUTANEOUS at 11:18

## 2018-12-07 RX ADMIN — PHENYLEPHRINE HYDROCHLORIDE 100 MCG: 10 INJECTION INTRAVENOUS at 11:36

## 2018-12-07 RX ADMIN — KETAMINE HYDROCHLORIDE 50 MG: 50 INJECTION, SOLUTION INTRAMUSCULAR; INTRAVENOUS at 11:47

## 2018-12-07 RX ADMIN — Medication 0.6 MG: at 12:40

## 2018-12-07 RX ADMIN — HYDROMORPHONE HYDROCHLORIDE 0.2 MG: 2 INJECTION INTRAMUSCULAR; INTRAVENOUS; SUBCUTANEOUS at 11:46

## 2018-12-07 RX ADMIN — ROSUVASTATIN CALCIUM 10 MG: 10 TABLET, COATED ORAL at 20:58

## 2018-12-07 RX ADMIN — Medication 2 G: at 23:24

## 2018-12-07 RX ADMIN — LIDOCAINE HYDROCHLORIDE 100 MG: 20 INJECTION, SOLUTION INFILTRATION; PERINEURAL at 11:04

## 2018-12-07 RX ADMIN — LEVOTHYROXINE SODIUM 75 MCG: 75 TABLET ORAL at 16:55

## 2018-12-07 RX ADMIN — SODIUM CHLORIDE, POTASSIUM CHLORIDE, SODIUM LACTATE AND CALCIUM CHLORIDE: 600; 310; 30; 20 INJECTION, SOLUTION INTRAVENOUS at 11:27

## 2018-12-07 RX ADMIN — FENTANYL CITRATE 50 MCG: 50 INJECTION INTRAMUSCULAR; INTRAVENOUS at 11:01

## 2018-12-07 RX ADMIN — HYDROMORPHONE HYDROCHLORIDE 1 MG: 2 INJECTION INTRAMUSCULAR; INTRAVENOUS; SUBCUTANEOUS at 13:16

## 2018-12-07 RX ADMIN — EPHEDRINE SULFATE 10 MG: 50 INJECTION, SOLUTION INTRAVENOUS at 12:28

## 2018-12-07 RX ADMIN — SUCCINYLCHOLINE CHLORIDE 200 MG: 20 INJECTION, SOLUTION INTRAMUSCULAR; INTRAVENOUS at 11:04

## 2018-12-07 RX ADMIN — PHENYLEPHRINE HYDROCHLORIDE 100 MCG: 10 INJECTION INTRAVENOUS at 12:30

## 2018-12-07 RX ADMIN — ETOMIDATE 20 MG: 2 INJECTION INTRAVENOUS at 11:04

## 2018-12-07 RX ADMIN — PIOGLITAZONE 15 MG: 15 TABLET ORAL at 16:56

## 2018-12-07 RX ADMIN — FENOFIBRATE 160 MG: 160 TABLET, FILM COATED ORAL at 16:54

## 2018-12-07 RX ADMIN — EPHEDRINE SULFATE 20 MG: 50 INJECTION, SOLUTION INTRAVENOUS at 11:30

## 2018-12-07 RX ADMIN — SODIUM CHLORIDE: 9 INJECTION, SOLUTION INTRAVENOUS at 10:09

## 2018-12-07 RX ADMIN — CYCLOBENZAPRINE HYDROCHLORIDE 10 MG: 10 TABLET, FILM COATED ORAL at 20:55

## 2018-12-07 RX ADMIN — CEFAZOLIN 2 G: 1 INJECTION, POWDER, FOR SOLUTION INTRAMUSCULAR; INTRAVENOUS; PARENTERAL at 11:20

## 2018-12-07 RX ADMIN — FENTANYL CITRATE 50 MCG: 50 INJECTION INTRAMUSCULAR; INTRAVENOUS at 11:11

## 2018-12-07 RX ADMIN — Medication 0.2 MG: at 11:00

## 2018-12-07 RX ADMIN — SODIUM CHLORIDE, POTASSIUM CHLORIDE, SODIUM LACTATE AND CALCIUM CHLORIDE: 600; 310; 30; 20 INJECTION, SOLUTION INTRAVENOUS at 11:55

## 2018-12-07 RX ADMIN — METOCLOPRAMIDE 10 MG: 5 INJECTION, SOLUTION INTRAMUSCULAR; INTRAVENOUS at 11:12

## 2018-12-07 RX ADMIN — ONDANSETRON HYDROCHLORIDE 4 MG: 4 INJECTION, SOLUTION INTRAMUSCULAR; INTRAVENOUS at 12:40

## 2018-12-07 RX ADMIN — HYDROCODONE BITARTRATE AND ACETAMINOPHEN 1 TABLET: 5; 325 TABLET ORAL at 22:00

## 2018-12-07 RX ADMIN — ROCURONIUM BROMIDE 20 MG: 10 INJECTION INTRAVENOUS at 11:15

## 2018-12-07 ASSESSMENT — PAIN SCALES - GENERAL
PAINLEVEL_OUTOF10: 8
PAINLEVEL_OUTOF10: 9
PAINLEVEL_OUTOF10: 0
PAINLEVEL_OUTOF10: 6
PAINLEVEL_OUTOF10: 6
PAINLEVEL_OUTOF10: 9

## 2018-12-07 ASSESSMENT — PULMONARY FUNCTION TESTS
PIF_VALUE: 16
PIF_VALUE: 17
PIF_VALUE: 16
PIF_VALUE: 19
PIF_VALUE: 15
PIF_VALUE: 16
PIF_VALUE: 17
PIF_VALUE: 16
PIF_VALUE: 16
PIF_VALUE: 17
PIF_VALUE: 15
PIF_VALUE: 15
PIF_VALUE: 17
PIF_VALUE: 16
PIF_VALUE: 17
PIF_VALUE: 17
PIF_VALUE: 16
PIF_VALUE: 17
PIF_VALUE: 16
PIF_VALUE: 15
PIF_VALUE: 3
PIF_VALUE: 17
PIF_VALUE: 16
PIF_VALUE: 17
PIF_VALUE: 2
PIF_VALUE: 17
PIF_VALUE: 18
PIF_VALUE: 1
PIF_VALUE: 17
PIF_VALUE: 17
PIF_VALUE: 15
PIF_VALUE: 16
PIF_VALUE: 17
PIF_VALUE: 17
PIF_VALUE: 16
PIF_VALUE: 16
PIF_VALUE: 21
PIF_VALUE: 17
PIF_VALUE: 16
PIF_VALUE: 16
PIF_VALUE: 17
PIF_VALUE: 17
PIF_VALUE: 2
PIF_VALUE: 17
PIF_VALUE: 15
PIF_VALUE: 16
PIF_VALUE: 15
PIF_VALUE: 1
PIF_VALUE: 16
PIF_VALUE: 4
PIF_VALUE: 3
PIF_VALUE: 17
PIF_VALUE: 1
PIF_VALUE: 16
PIF_VALUE: 17
PIF_VALUE: 16
PIF_VALUE: 16
PIF_VALUE: 14
PIF_VALUE: 14
PIF_VALUE: 15
PIF_VALUE: 17
PIF_VALUE: 16
PIF_VALUE: 16
PIF_VALUE: 1
PIF_VALUE: 17
PIF_VALUE: 17
PIF_VALUE: 16
PIF_VALUE: 17
PIF_VALUE: 2
PIF_VALUE: 16
PIF_VALUE: 19
PIF_VALUE: 15
PIF_VALUE: 15
PIF_VALUE: 4
PIF_VALUE: 16
PIF_VALUE: 17
PIF_VALUE: 3
PIF_VALUE: 16
PIF_VALUE: 17
PIF_VALUE: 18
PIF_VALUE: 16
PIF_VALUE: 21
PIF_VALUE: 17
PIF_VALUE: 3
PIF_VALUE: 17
PIF_VALUE: 14
PIF_VALUE: 16
PIF_VALUE: 17
PIF_VALUE: 16
PIF_VALUE: 17
PIF_VALUE: 2
PIF_VALUE: 16
PIF_VALUE: 17
PIF_VALUE: 16
PIF_VALUE: 2
PIF_VALUE: 17
PIF_VALUE: 15
PIF_VALUE: 17
PIF_VALUE: 15
PIF_VALUE: 15
PIF_VALUE: 17
PIF_VALUE: 5
PIF_VALUE: 17
PIF_VALUE: 15
PIF_VALUE: 17
PIF_VALUE: 16
PIF_VALUE: 2
PIF_VALUE: 17

## 2018-12-07 ASSESSMENT — PAIN - FUNCTIONAL ASSESSMENT: PAIN_FUNCTIONAL_ASSESSMENT: 0-10

## 2018-12-08 VITALS
OXYGEN SATURATION: 98 % | TEMPERATURE: 98 F | WEIGHT: 199.6 LBS | HEART RATE: 79 BPM | SYSTOLIC BLOOD PRESSURE: 118 MMHG | DIASTOLIC BLOOD PRESSURE: 55 MMHG | RESPIRATION RATE: 18 BRPM | BODY MASS INDEX: 34.08 KG/M2 | HEIGHT: 64 IN

## 2018-12-08 LAB — GLUCOSE BLD-MCNC: 260 MG/DL (ref 70–108)

## 2018-12-08 PROCEDURE — 2709999900 HC NON-CHARGEABLE SUPPLY

## 2018-12-08 PROCEDURE — 99024 POSTOP FOLLOW-UP VISIT: CPT | Performed by: PHYSICIAN ASSISTANT

## 2018-12-08 PROCEDURE — 96366 THER/PROPH/DIAG IV INF ADDON: CPT

## 2018-12-08 PROCEDURE — 97116 GAIT TRAINING THERAPY: CPT

## 2018-12-08 PROCEDURE — 96367 TX/PROPH/DG ADDL SEQ IV INF: CPT

## 2018-12-08 PROCEDURE — 6370000000 HC RX 637 (ALT 250 FOR IP): Performed by: PHYSICIAN ASSISTANT

## 2018-12-08 PROCEDURE — 2580000003 HC RX 258: Performed by: PHYSICIAN ASSISTANT

## 2018-12-08 PROCEDURE — 6360000002 HC RX W HCPCS: Performed by: PHYSICIAN ASSISTANT

## 2018-12-08 PROCEDURE — 82948 REAGENT STRIP/BLOOD GLUCOSE: CPT

## 2018-12-08 PROCEDURE — G8978 MOBILITY CURRENT STATUS: HCPCS

## 2018-12-08 PROCEDURE — 96361 HYDRATE IV INFUSION ADD-ON: CPT

## 2018-12-08 PROCEDURE — 97161 PT EVAL LOW COMPLEX 20 MIN: CPT

## 2018-12-08 PROCEDURE — 51798 US URINE CAPACITY MEASURE: CPT

## 2018-12-08 PROCEDURE — G8979 MOBILITY GOAL STATUS: HCPCS

## 2018-12-08 RX ORDER — CEPHALEXIN 500 MG/1
500 CAPSULE ORAL 2 TIMES DAILY
Qty: 14 CAPSULE | Refills: 0 | Status: SHIPPED | OUTPATIENT
Start: 2018-12-08 | End: 2018-12-15

## 2018-12-08 RX ADMIN — DULOXETINE HYDROCHLORIDE 60 MG: 60 CAPSULE, DELAYED RELEASE ORAL at 09:52

## 2018-12-08 RX ADMIN — Medication 5 UNITS: at 09:56

## 2018-12-08 RX ADMIN — PIOGLITAZONE 15 MG: 15 TABLET ORAL at 09:55

## 2018-12-08 RX ADMIN — Medication 2 G: at 06:36

## 2018-12-08 RX ADMIN — HYDROCODONE BITARTRATE AND ACETAMINOPHEN 1 TABLET: 5; 325 TABLET ORAL at 04:12

## 2018-12-08 RX ADMIN — SODIUM CHLORIDE: 9 INJECTION, SOLUTION INTRAVENOUS at 06:36

## 2018-12-08 RX ADMIN — RAMIPRIL 5 MG: 5 CAPSULE ORAL at 09:55

## 2018-12-08 RX ADMIN — LEVOTHYROXINE SODIUM 75 MCG: 75 TABLET ORAL at 05:50

## 2018-12-08 RX ADMIN — FENOFIBRATE 160 MG: 160 TABLET, FILM COATED ORAL at 09:53

## 2018-12-08 RX ADMIN — DOCUSATE SODIUM 100 MG: 100 CAPSULE, LIQUID FILLED ORAL at 09:52

## 2018-12-08 RX ADMIN — METOPROLOL SUCCINATE 100 MG: 100 TABLET, EXTENDED RELEASE ORAL at 09:54

## 2018-12-08 RX ADMIN — AMLODIPINE BESYLATE 2.5 MG: 2.5 TABLET ORAL at 09:51

## 2018-12-08 RX ADMIN — ISOSORBIDE MONONITRATE 30 MG: 30 TABLET, EXTENDED RELEASE ORAL at 09:54

## 2018-12-08 ASSESSMENT — PAIN SCALES - GENERAL
PAINLEVEL_OUTOF10: 8
PAINLEVEL_OUTOF10: 9
PAINLEVEL_OUTOF10: 6

## 2018-12-08 ASSESSMENT — PAIN DESCRIPTION - LOCATION: LOCATION: OTHER (COMMENT)

## 2018-12-08 ASSESSMENT — PAIN DESCRIPTION - PAIN TYPE: TYPE: ACUTE PAIN

## 2018-12-08 NOTE — PROGRESS NOTES
NPO after midnight  [employer] and drivers license  Wear comfortable clean clothing  Do not bring jewelry   Shower night before and morning of surgery with a liquid antibacterial soap  Bring medications in original bottles  Follow all instructions given by your physician   needed at discharge  Call [phone] for any questions
Wayne catheter removed by this student due to patient complaining of bladder pain. Bladder palpated and was tender to touch. Stated pain is easing up now that wayne has been removed.
Writer messaged Elvira and asked if he wanted a bladder scan, he replied with \"yes, thanks\". Bladder scan was performed at 0233 with 721 ML urine out. [address] [facility] [place] notified. Straight cath ordered. Straight cath performed, 700 out. Will continue to monitor urine output.
Additional Comments: Pt was using cane and RW PTA. Cane at home, RW for community       Objective:       RLE AROM: WNL         LLE AROM : WNL                                  Strength RLE: WFL    Strength LLE: WFL                                   Balance  Sitting - Static: Good  Standing - Static: Good    Rolling to Left: Stand by assistance (Cues to log roll, Sidelying to sit SBA x 1 )  Sit to Supine: Stand by assistance (HOB elevated )    Transfers  Sit to Stand: Contact guard assistance  Stand to sit: Contact guard assistance       Ambulation 1  Surface: level tile  Device: Rolling Walker  Assistance: Contact guard assistance  Quality of Gait: shuffling steps, forward flexed postue, cues for upright posture. Cues to increase step length bilaterlly. Distance: 15'x 1 (pt amb additional distance below)             Exercises:  Comments: B LE ther ex 1 x 10 ankle pumps, long arc quads and hip flexion. Ther ex performed to increase strength for functional mobility. Activity Tolerance:  Activity Tolerance: Patient Tolerated treatment well    Treatment Initiated: See there ex above. Pt amb with 'x1 with CGA x 1. See gait deviations above. Assessment: Body structures, Functions, Activity limitations: Decreased functional mobility , Decreased endurance  Assessment: Pt tolerated session well. Pt to benefit from continued therapy to increase strength for functional mobility. Pt safe for discharge home with wife   Prognosis: Excellent    Clinical Presentation: Low - Stable and Uncomplicated:  Pt tolerated session well. Pt to benefit from continued therapy to increase strength for functional mobility.  Pt safe for discharge home with wife     Decision Making: Low Complexitybased on patient assessment and decision making process of determining plan of care and establishing reasonable expectations for measurable functional outcomes    REQUIRES PT FOLLOW UP:

## 2018-12-09 ASSESSMENT — ENCOUNTER SYMPTOMS
BACK PAIN: 1
ABDOMINAL PAIN: 0
COLOR CHANGE: 0
CHEST TIGHTNESS: 0

## 2018-12-20 ENCOUNTER — OFFICE VISIT (OUTPATIENT)
Dept: NEUROSURGERY | Age: 78
End: 2018-12-20

## 2018-12-20 VITALS
BODY MASS INDEX: 32.61 KG/M2 | HEART RATE: 65 BPM | SYSTOLIC BLOOD PRESSURE: 96 MMHG | DIASTOLIC BLOOD PRESSURE: 50 MMHG | HEIGHT: 64 IN | WEIGHT: 191 LBS

## 2018-12-20 DIAGNOSIS — G89.4 CHRONIC PAIN SYNDROME: Primary | ICD-10-CM

## 2018-12-20 PROCEDURE — 99024 POSTOP FOLLOW-UP VISIT: CPT | Performed by: PHYSICIAN ASSISTANT

## 2019-01-03 ENCOUNTER — OFFICE VISIT (OUTPATIENT)
Dept: PHYSICAL MEDICINE AND REHAB | Age: 79
End: 2019-01-03

## 2019-01-03 VITALS
SYSTOLIC BLOOD PRESSURE: 98 MMHG | HEIGHT: 64 IN | WEIGHT: 191 LBS | BODY MASS INDEX: 32.61 KG/M2 | DIASTOLIC BLOOD PRESSURE: 50 MMHG | HEART RATE: 62 BPM

## 2019-01-03 DIAGNOSIS — M96.1 FAILED BACK SYNDROME OF LUMBAR SPINE: Primary | ICD-10-CM

## 2019-01-03 DIAGNOSIS — G89.4 CHRONIC PAIN SYNDROME: ICD-10-CM

## 2019-01-03 DIAGNOSIS — M47.816 SPONDYLOSIS OF LUMBAR REGION WITHOUT MYELOPATHY OR RADICULOPATHY: ICD-10-CM

## 2019-01-03 DIAGNOSIS — M48.061 SPINAL STENOSIS OF LUMBAR REGION WITHOUT NEUROGENIC CLAUDICATION: ICD-10-CM

## 2019-01-03 PROCEDURE — 99024 POSTOP FOLLOW-UP VISIT: CPT | Performed by: NURSE PRACTITIONER

## 2019-01-03 RX ORDER — TRAMADOL HYDROCHLORIDE 50 MG/1
50 TABLET ORAL EVERY 8 HOURS PRN
Qty: 90 TABLET | Refills: 0 | Status: SHIPPED | OUTPATIENT
Start: 2019-01-03 | End: 2019-02-20 | Stop reason: SDUPTHER

## 2019-01-03 ASSESSMENT — ENCOUNTER SYMPTOMS
SORE THROAT: 0
VOMITING: 0
RHINORRHEA: 0
COUGH: 0
SHORTNESS OF BREATH: 0
DIARRHEA: 0
COLOR CHANGE: 0
WHEEZING: 0
PHOTOPHOBIA: 0
CONSTIPATION: 0
SINUS PRESSURE: 0
ABDOMINAL PAIN: 0
NAUSEA: 0
EYE PAIN: 0
BACK PAIN: 1
CHEST TIGHTNESS: 0

## 2019-01-07 ENCOUNTER — TELEPHONE (OUTPATIENT)
Dept: PHYSICAL MEDICINE AND REHAB | Age: 79
End: 2019-01-07

## 2019-01-07 DIAGNOSIS — M54.17 LUMBOSACRAL RADICULITIS: ICD-10-CM

## 2019-01-07 DIAGNOSIS — M48.061 SPINAL STENOSIS OF LUMBAR REGION WITHOUT NEUROGENIC CLAUDICATION: Primary | ICD-10-CM

## 2019-01-07 DIAGNOSIS — M17.0 PRIMARY OSTEOARTHRITIS OF BOTH KNEES: ICD-10-CM

## 2019-01-07 DIAGNOSIS — M16.0 PRIMARY OSTEOARTHRITIS OF BOTH HIPS: ICD-10-CM

## 2019-01-11 ENCOUNTER — OFFICE VISIT (OUTPATIENT)
Dept: NEUROSURGERY | Age: 79
End: 2019-01-11

## 2019-01-11 VITALS
DIASTOLIC BLOOD PRESSURE: 59 MMHG | HEART RATE: 68 BPM | SYSTOLIC BLOOD PRESSURE: 102 MMHG | HEIGHT: 64 IN | BODY MASS INDEX: 32.17 KG/M2 | WEIGHT: 188.4 LBS

## 2019-01-11 DIAGNOSIS — G89.4 CHRONIC PAIN SYNDROME: Primary | ICD-10-CM

## 2019-01-11 PROCEDURE — 99024 POSTOP FOLLOW-UP VISIT: CPT | Performed by: PHYSICIAN ASSISTANT

## 2019-02-20 DIAGNOSIS — M48.061 SPINAL STENOSIS OF LUMBAR REGION WITHOUT NEUROGENIC CLAUDICATION: ICD-10-CM

## 2019-02-20 DIAGNOSIS — M96.1 FAILED BACK SYNDROME OF LUMBAR SPINE: ICD-10-CM

## 2019-02-20 DIAGNOSIS — G89.4 CHRONIC PAIN SYNDROME: ICD-10-CM

## 2019-02-20 DIAGNOSIS — M47.816 SPONDYLOSIS OF LUMBAR REGION WITHOUT MYELOPATHY OR RADICULOPATHY: ICD-10-CM

## 2019-02-20 RX ORDER — TRAMADOL HYDROCHLORIDE 50 MG/1
50 TABLET ORAL EVERY 8 HOURS PRN
Qty: 90 TABLET | Refills: 0 | Status: SHIPPED | OUTPATIENT
Start: 2019-02-20 | End: 2019-03-22

## 2019-04-03 ENCOUNTER — OFFICE VISIT (OUTPATIENT)
Dept: PHYSICAL MEDICINE AND REHAB | Age: 79
End: 2019-04-03
Payer: MEDICARE

## 2019-04-03 VITALS
WEIGHT: 197 LBS | HEIGHT: 66 IN | BODY MASS INDEX: 31.66 KG/M2 | SYSTOLIC BLOOD PRESSURE: 99 MMHG | HEART RATE: 70 BPM | DIASTOLIC BLOOD PRESSURE: 61 MMHG

## 2019-04-03 DIAGNOSIS — M96.1 FAILED BACK SYNDROME OF LUMBAR SPINE: Primary | ICD-10-CM

## 2019-04-03 DIAGNOSIS — M17.0 PRIMARY OSTEOARTHRITIS OF BOTH KNEES: ICD-10-CM

## 2019-04-03 DIAGNOSIS — M47.816 SPONDYLOSIS OF LUMBAR REGION WITHOUT MYELOPATHY OR RADICULOPATHY: ICD-10-CM

## 2019-04-03 DIAGNOSIS — M48.061 SPINAL STENOSIS OF LUMBAR REGION WITHOUT NEUROGENIC CLAUDICATION: ICD-10-CM

## 2019-04-03 DIAGNOSIS — M54.17 LUMBOSACRAL RADICULITIS: ICD-10-CM

## 2019-04-03 DIAGNOSIS — G89.4 CHRONIC PAIN SYNDROME: ICD-10-CM

## 2019-04-03 DIAGNOSIS — M16.0 PRIMARY OSTEOARTHRITIS OF BOTH HIPS: ICD-10-CM

## 2019-04-03 PROCEDURE — 99213 OFFICE O/P EST LOW 20 MIN: CPT | Performed by: NURSE PRACTITIONER

## 2019-04-03 RX ORDER — TRAMADOL HYDROCHLORIDE 50 MG/1
50 TABLET ORAL EVERY 8 HOURS PRN
Qty: 90 TABLET | Refills: 0 | Status: SHIPPED | OUTPATIENT
Start: 2019-04-03 | End: 2019-05-20 | Stop reason: SDUPTHER

## 2019-04-03 ASSESSMENT — ENCOUNTER SYMPTOMS
NAUSEA: 0
DIARRHEA: 0
CHEST TIGHTNESS: 0
SHORTNESS OF BREATH: 0
SORE THROAT: 0
COLOR CHANGE: 0
RHINORRHEA: 0
ABDOMINAL PAIN: 0
VOMITING: 0
EYE PAIN: 0
SINUS PRESSURE: 0
PHOTOPHOBIA: 0
BACK PAIN: 1
CONSTIPATION: 0
COUGH: 0
WHEEZING: 0

## 2019-04-03 NOTE — PROGRESS NOTES
[facility]  [address] [facility] [address]  Dept: [phone]  Dept Fax: [phone]  Loc: [phone]    Visit Date: 4/3/2019    Functionality Assessment/Goals Worksheet     On a scale of 0 (Does not Interfere) to 10 (Completely Interferes)     1. Which number describes how during the past week pain has interfered with       the following:  A. General Activity:  8  B. Mood: 8  C. Walking Ability:  8  D. Normal Work (Includes both work outside the home and housework):  8  E. Relations with Other People:   4  F. Sleep:   9  G. Enjoyment of Life:   9    2. Patient Prefers to Take their Pain Medications:     [x]  On a regular basis   []  Only when necessary    []  Does not take pain medications    3. What are the Patient's Goals/Expectations for Visiting Pain Management? []  Learn about my pain    []  Receive Medication   []  Physical Therapy     []  Treat Depression   []  Receive Injections    []  Treat Sleep   []  Deal with Anxiety and Stress   []  Treat Opoid Dependence/Addiction   []  Other:    HPI:   Armond Sparrow is a 78 y.o. male is here today for    Chief Complaint: Low back pain    HPI   F/U wife present here with walker. Has permanent SCS placed with Dr Anne-Marie Kaufman  12/7/2018 state she is receiving about 50% pain relief or benefit. He has bene using a lot of power and battery has to be charged dally, One day it just turned off and had no power. They are meeting rep tomorrow for reprogramming. He has BLE weakness and has lost some strength and endurance. He continues to take Tramadol. Medications reviewed. Patient denies side effects with medications. Patient states he is taking medications as prescribed. Hedenies receiving pain medications from other sources. He denies any ER visits since last visit. Pain scale with out pain medications or at its worst is 7/10.   Pain scale with pain medications or at its best is 4/10. Last dose of Tramadol was today  Drug screen reviewed from 1/3/2019  and was appropriate  Pill count was completed today and was appropriate  Patient does not have naloxone available at home. Patient has not required use of naloxone at home since last office visit. The patientis allergic to nsaids. Past Medical History  Angelo Bro  has a past medical history of Arthritis, CAD (coronary artery disease), Depression, Diabetes mellitus ([address] GERD (gastroesophageal reflux disease), Hyperlipidemia, and Thyroid disease. Past Surgical History  The patient  has a past surgical history that includes Cholecystectomy; Carpal tunnel release (Right, 1970); Tooth Extraction (1970); Rotator cuff repair (Left, 1998); Retinopathy surgery (Bilateral, 1997); debridement (Left, 12/2003); cardiovascular stress test (08/2015); Colonoscopy; Upper gastrointestinal endoscopy (2012); Nerve Block Lumb Facet Level 1 Bilateral (Bilateral, 11/27/2017); Nerve Surgery (Bilateral, 02/06/2018); pr inj dx/ther agnt paravert facet joint, lumbar/sac, 1st level (Bilateral, 2/6/2018); other surgical history (Right, 03/05/2018); pr nervous system surgery unlisted (Right, 3/5/2018); other surgical history (Left, 03/27/2018); pr nervous system surgery unlisted (Left, 3/27/2018); eye surgery (2005); back surgery (04/1991); Cardiac catheterization (12/2005); Cardiac catheterization (07/11/2018); pr njx dx/ther sbst intrlmnr lmbr/sac w/img gdn (N/A, 7/19/2018); pr office/outpt visit,procedure only (N/A, 10/12/2018); and IMPLANTATION VAGAL NERVE STIMULATOR (N/A, 12/7/2018). Family History  This patient's family history includes Breast Cancer in his brother; Cancer in his father; Colon Cancer in his father; Diabetes in his brother, father, and paternal grandmother; Heart Disease in his brother, brother, and brother; High Blood Pressure in his mother; Stroke in his mother.     Social History  Angelo Bro  reports that he has been smoking pipe and cigars. He has never used smokeless tobacco. He reports that he does not drink alcohol or use drugs. Medications    Current Outpatient Medications:     traMADol (ULTRAM) 50 MG tablet, Take 1 tablet by mouth every 8 hours as needed for Pain for up to 30 days. , Disp: 90 tablet, Rfl: 0    Misc. Devices (ROLLER WALKER) MISC, 1 each by Does not apply route daily, Disp: 1 each, Rfl: 0    acetaminophen (TYLENOL) 500 MG tablet, Take 500 mg by mouth daily, Disp: , Rfl:     levothyroxine (SYNTHROID) 75 MCG tablet, Take 75 mcg by mouth Daily, Disp: , Rfl:     fenofibrate 160 MG tablet, Take 160 mg by mouth daily, Disp: , Rfl:     pioglitazone (ACTOS) 15 MG tablet, Take 15 mg by mouth daily, Disp: , Rfl:     aspirin 81 MG tablet, Take 81 mg by mouth daily, Disp: , Rfl:     metoprolol succinate (TOPROL XL) 100 MG extended release tablet, Take 100 mg by mouth daily, Disp: , Rfl:     rosuvastatin (CRESTOR) 10 MG tablet, Take 10 mg by mouth daily, Disp: , Rfl:     amLODIPine (NORVASC) 2.5 MG tablet, Take 2.5 mg by mouth daily, Disp: , Rfl:     donepezil (ARICEPT) 10 MG tablet, Take 10 mg by mouth nightly, Disp: , Rfl:     lansoprazole (PREVACID) 30 MG delayed release capsule, Take 30 mg by mouth daily, Disp: , Rfl:     ferrous sulfate 325 (65 Fe) MG tablet, Take 325 mg by mouth daily (with breakfast), Disp: , Rfl:     Cholecalciferol (VITAMIN D3) 5000 units TABS, Take 5,000 Units by mouth daily, Disp: , Rfl:     ramipril (ALTACE) 5 MG capsule, Take 5 mg by mouth daily. , Disp: , Rfl:     isosorbide mononitrate (IMDUR) 30 MG CR tablet, Take 30 mg by mouth daily. , Disp: , Rfl:     DULoxetine (CYMBALTA) 60 MG capsule, Take 60 mg by mouth daily. , Disp: , Rfl:     insulin NPH (HUMULIN N;NOVOLIN N) 100 UNIT/ML injection, Inject 6-8 Units into the skin 2 times daily (before meals) , Disp: , Rfl:     insulin regular (HUMULIN R;NOVOLIN R) 100 UNIT/ML injection, Inject 5 Units into the skin 2 times daily (before meals) Between 10-12 bid, Disp: , Rfl:     loperamide (IMODIUM) 2 MG capsule, Take 2 mg by mouth as needed. , Disp: , Rfl:     Multiple Vitamins-Minerals (MULTIVITAMIN & MINERAL PO), Take  by mouth daily. , Disp: , Rfl:     Subjective:      Review of Systems   Constitutional: Positive for activity change. Negative for appetite change, chills, diaphoresis, fatigue, fever and unexpected weight change. HENT: Negative for congestion, ear pain, hearing loss, mouth sores, nosebleeds, rhinorrhea, sinus pressure and sore throat. Eyes: Negative for photophobia, pain and visual disturbance. Respiratory: Negative for cough, chest tightness, shortness of breath and wheezing. Cardiovascular: Negative for chest pain and palpitations. CAD   Gastrointestinal: Negative for abdominal pain, constipation, diarrhea, nausea and vomiting. Endocrine: Negative for cold intolerance, heat intolerance, polydipsia, polyphagia and polyuria. DM Thyroid issues   Genitourinary: Negative for decreased urine volume, difficulty urinating, frequency and hematuria. Musculoskeletal: Positive for arthralgias, back pain, gait problem and myalgias. Negative for neck pain and neck stiffness. Skin: Negative for color change and rash. Allergic/Immunologic: Negative for food allergies and immunocompromised state. Neurological: Positive for weakness and numbness. Negative for dizziness, tremors, seizures, syncope, facial asymmetry, speech difficulty, light-headedness and headaches. Hematological: Does not bruise/bleed easily. Psychiatric/Behavioral: Negative for agitation, behavioral problems, confusion, decreased concentration, dysphoric mood, hallucinations, self-injury, sleep disturbance and suicidal ideas. The patient is not nervous/anxious and is not hyperactive.         Objective:     Vitals:    04/03/19 1130   BP: 99/61   Site: Right Upper Arm   Position: Sitting   Pulse: 70   Weight: 197 lb (89.4 kg)   Height: 5' 6\" (1.676 m)       Physical Exam   Constitutional: He is oriented to person, place, and time. He appears well-developed and well-nourished. No distress. HENT:   Head: Normocephalic and atraumatic. Right Ear: External ear normal.   Left Ear: External ear normal.   Nose: Nose normal.   Mouth/Throat: Oropharynx is clear and moist. No oropharyngeal exudate. Eyes: Pupils are equal, round, and reactive to light. Conjunctivae and EOM are normal. Right eye exhibits no discharge. Left eye exhibits no discharge. No scleral icterus. Neck: Normal range of motion and full passive range of motion without pain. Neck supple. No muscular tenderness present. No neck rigidity. No edema, no erythema and normal range of motion present. No thyromegaly present. Cardiovascular: Normal rate, regular rhythm, normal heart sounds and intact distal pulses. Exam reveals no gallop and no friction rub. No murmur heard. Pulmonary/Chest: Effort normal and breath sounds normal. No respiratory distress. He has no wheezes. He has no rales. He exhibits no tenderness. Abdominal: Soft. Bowel sounds are normal. He exhibits no distension. There is no tenderness. There is no rebound and no guarding. Musculoskeletal: He exhibits tenderness. Right shoulder: Normal.        Left shoulder: Normal.        Right wrist: Normal.        Left wrist: Normal.        Right hip: Normal.        Left hip: Normal.        Right knee: Normal.        Left knee: Normal.        Right ankle: Normal.        Left ankle: Normal.        Cervical back: Normal.        Thoracic back: He exhibits tenderness. Lumbar back: He exhibits decreased range of motion, tenderness, pain and spasm. Back:         Right upper leg: He exhibits tenderness. Left upper leg: He exhibits tenderness. Neurological: He is alert and oriented to person, place, and time. He has normal strength and normal reflexes. He is not disoriented.  He displays no atrophy. No cranial nerve deficit or sensory deficit. He exhibits normal muscle tone. He displays a negative Romberg sign. Coordination and gait normal. He displays no Babinski's sign on the right side. SLR-  Motor 5/5 BUE BLE   Skin: Skin is warm. No rash noted. He is not diaphoretic. No erythema. No pallor. Psychiatric: He has a normal mood and affect. His speech is normal and behavior is normal. Judgment and thought content normal. His mood appears not anxious. His affect is not angry, not blunt, not labile and not inappropriate. He is not agitated, not aggressive, not hyperactive, not slowed, not withdrawn, not actively hallucinating and not combative. Thought content is not paranoid and not delusional. Cognition and memory are normal. Cognition and memory are not impaired. He does not express impulsivity or inappropriate judgment. He does not exhibit a depressed mood. He expresses no homicidal and no suicidal ideation. He expresses no suicidal plans and no homicidal plans. He exhibits normal recent memory and normal remote memory. He is attentive. Nursing note and vitals reviewed. RONDA test:negative  Yeomans test: negative  Gaenslen test: negative     Assessment:     1. Failed back syndrome of lumbar spine    2. Spondylosis of lumbar region without myelopathy or radiculopathy    3. Spinal stenosis of lumbar region without neurogenic claudication    4. Chronic pain syndrome    5. Lumbosacral radiculitis    6. Primary osteoarthritis of both hips    7. Primary osteoarthritis of both knees            Plan:      · OARRS reviewed. Current MED:15  · Patient was not offered naloxone for home. · Discussed long term side effects of medications, tolerance, dependency and addiction. · Previous UDS reviewed  · UDS preformed today for compliance. · Patient told can not receive any pain medications from any other source. · No evidence of abuse, diversion or aberrant behavior.    Medications and/or procedures to improve function and quality of life- patient understanding with this and that may not be pain free   Discussed with patient about safe storage of medications at home   Discussed possible weaning of medication dosing dependent on treatment/procedure results.  Testing:none    Procedures: SCS placed 12/7/2018 with Dr Kimberly Cowart Discussed with patient about risks with procedure including infection, reaction to medication, increased pain, or bleeding.  Medications:Continue Tramadol   Water therapy and dry needling ordered      Meds. Prescribed:   Orders Placed This Encounter   Medications    traMADol (ULTRAM) 50 MG tablet     Sig: Take 1 tablet by mouth every 8 hours as needed for Pain for up to 30 days. Dispense:  90 tablet     Refill:  0     Reduce doses taken as pain becomes manageable       Return in about 3 months (around 7/3/2019) for Follow up pain medications.          Electronically signed by DIOGO Paul CNP on4/3/2019 at 11:59 AM

## 2019-05-17 DIAGNOSIS — M16.0 PRIMARY OSTEOARTHRITIS OF BOTH HIPS: ICD-10-CM

## 2019-05-17 DIAGNOSIS — M54.17 LUMBOSACRAL RADICULITIS: ICD-10-CM

## 2019-05-17 DIAGNOSIS — M48.061 SPINAL STENOSIS OF LUMBAR REGION WITHOUT NEUROGENIC CLAUDICATION: ICD-10-CM

## 2019-05-17 DIAGNOSIS — M47.816 SPONDYLOSIS OF LUMBAR REGION WITHOUT MYELOPATHY OR RADICULOPATHY: ICD-10-CM

## 2019-05-17 DIAGNOSIS — M17.0 PRIMARY OSTEOARTHRITIS OF BOTH KNEES: ICD-10-CM

## 2019-05-17 DIAGNOSIS — G89.4 CHRONIC PAIN SYNDROME: ICD-10-CM

## 2019-05-17 DIAGNOSIS — M96.1 FAILED BACK SYNDROME OF LUMBAR SPINE: ICD-10-CM

## 2019-05-17 NOTE — TELEPHONE ENCOUNTER
Carrie De Leon called requesting a refill on the following medications:  Requested Prescriptions     Pending Prescriptions Disp Refills    traMADol (ULTRAM) 50 MG tablet 90 tablet 0     Sig: Take 1 tablet by mouth every 8 hours as needed for Pain for up to 30 days. [facility]      Date of last visit: 4/3/19  Date of next visit (if applicable): 8/5/1253

## 2019-05-20 RX ORDER — TRAMADOL HYDROCHLORIDE 50 MG/1
50 TABLET ORAL EVERY 8 HOURS PRN
Qty: 90 TABLET | Refills: 0 | Status: SHIPPED | OUTPATIENT
Start: 2019-05-20 | End: 2019-06-19

## 2019-05-20 NOTE — TELEPHONE ENCOUNTER
OARRS reviewed. UDS: + for  Tramadol POSITIVE CONSISTENT  O-Desmethyltramadol POSITIVE CONSISTENT. Narcan offered: Not offered  Last seen: 4/3/2019.  Follow-up:   Future Appointments   Date Time Provider Paul Kayleen   7/1/2019 12:00 PM DIOGO Weathers - CNP [facility] - [place]

## 2019-07-01 ENCOUNTER — OFFICE VISIT (OUTPATIENT)
Dept: PHYSICAL MEDICINE AND REHAB | Age: 79
End: 2019-07-01
Payer: MEDICARE

## 2019-07-01 VITALS
HEIGHT: 66 IN | WEIGHT: 193 LBS | BODY MASS INDEX: 31.02 KG/M2 | HEART RATE: 80 BPM | SYSTOLIC BLOOD PRESSURE: 120 MMHG | DIASTOLIC BLOOD PRESSURE: 64 MMHG

## 2019-07-01 DIAGNOSIS — M54.17 LUMBOSACRAL RADICULITIS: ICD-10-CM

## 2019-07-01 DIAGNOSIS — M47.816 SPONDYLOSIS OF LUMBAR REGION WITHOUT MYELOPATHY OR RADICULOPATHY: ICD-10-CM

## 2019-07-01 DIAGNOSIS — M48.061 SPINAL STENOSIS OF LUMBAR REGION WITHOUT NEUROGENIC CLAUDICATION: ICD-10-CM

## 2019-07-01 DIAGNOSIS — G89.4 CHRONIC PAIN SYNDROME: ICD-10-CM

## 2019-07-01 DIAGNOSIS — M17.0 PRIMARY OSTEOARTHRITIS OF BOTH KNEES: ICD-10-CM

## 2019-07-01 DIAGNOSIS — M16.0 PRIMARY OSTEOARTHRITIS OF BOTH HIPS: ICD-10-CM

## 2019-07-01 DIAGNOSIS — M96.1 FAILED BACK SYNDROME OF LUMBAR SPINE: Primary | ICD-10-CM

## 2019-07-01 PROCEDURE — 99213 OFFICE O/P EST LOW 20 MIN: CPT | Performed by: NURSE PRACTITIONER

## 2019-07-01 RX ORDER — TRAMADOL HYDROCHLORIDE 50 MG/1
50 TABLET ORAL EVERY 8 HOURS PRN
Qty: 90 TABLET | Refills: 0 | Status: SHIPPED | OUTPATIENT
Start: 2019-07-01 | End: 2019-08-08 | Stop reason: SDUPTHER

## 2019-07-01 ASSESSMENT — ENCOUNTER SYMPTOMS
VOMITING: 0
NAUSEA: 0
SORE THROAT: 0
WHEEZING: 0
ABDOMINAL PAIN: 0
PHOTOPHOBIA: 0
CONSTIPATION: 0
DIARRHEA: 0
EYE PAIN: 0
SHORTNESS OF BREATH: 0
COUGH: 0
COLOR CHANGE: 0
SINUS PRESSURE: 0
RHINORRHEA: 0
CHEST TIGHTNESS: 0
BACK PAIN: 1

## 2019-07-01 NOTE — PROGRESS NOTES
spasm.        Back:         Right upper leg: He exhibits tenderness. Left upper leg: He exhibits tenderness. Neurological: He is alert and oriented to person, place, and time. He has normal strength and normal reflexes. He is not disoriented. He displays no atrophy. No cranial nerve deficit or sensory deficit. He exhibits normal muscle tone. He displays a negative Romberg sign. Coordination and gait normal. He displays no Babinski's sign on the right side. SLR-  Motor 5/5 BUE BLE   Skin: Skin is warm. No rash noted. He is not diaphoretic. No erythema. No pallor. Psychiatric: He has a normal mood and affect. His speech is normal and behavior is normal. Judgment and thought content normal. His mood appears not anxious. His affect is not angry, not blunt, not labile and not inappropriate. He is not agitated, not aggressive, not hyperactive, not slowed, not withdrawn, not actively hallucinating and not combative. Thought content is not paranoid and not delusional. Cognition and memory are normal. Cognition and memory are not impaired. He does not express impulsivity or inappropriate judgment. He does not exhibit a depressed mood. He expresses no homicidal and no suicidal ideation. He expresses no suicidal plans and no homicidal plans. He exhibits normal recent memory and normal remote memory. He is attentive. Nursing note and vitals reviewed. RONDA test: positive bilateral  Yeomans test: positive bilateral  Gaenslen test: positive bilateral     Assessment:     1. Failed back syndrome of lumbar spine    2. Spondylosis of lumbar region without myelopathy or radiculopathy    3. Spinal stenosis of lumbar region without neurogenic claudication    4. Lumbosacral radiculitis    5. Primary osteoarthritis of both hips    6. Primary osteoarthritis of both knees    7. Chronic pain syndrome            Plan:      · OARRS reviewed. Current MED: 15  · Patient was offered naloxone for home. refused   · Discussed long term side effects of medications, tolerance, dependency and addiction. · Previous UDS reviewed  · UDS preformed today for compliance. · Patient told can not receive any pain medications from any other source. · No evidence of abuse, diversion or aberrant behavior.  Medications and/or procedures to improve function and quality of life- patient understanding with this and that may not be pain free   Discussed with patient about safe storage of medications at home   Discussed possible weaning of medication dosing dependent on treatment/procedure results.  Testing: none   Procedures: none   Discussed with patient about risks with procedure including infection, reaction to medication, increased pain, or bleeding.  Medications:COnitnue Tramadol   Had SCS placed 12/2018 with Dr Diogo Del Toro receiving about 50% pain relief or benefit.  Uses walker and rolator walker, needs wheelchair, ordered   Keiko Dial was evaluated today and a DME order was entered for a standard wheelchair because he requires this to successfully complete daily living tasks of eating, toileting, personal cares, ambulating, dressing upper body, dressing lower body and meal preparation. A standard manual wheelchair is necessary due to patient's impaired ambulation and mobility restrictions and would be unable to resolve these daily living tasks using a cane or walker. The patient is capable of using a standard wheelchair safely in their home and can maneuver within their home with adequate access. There is a caregiver available to provide necessary assistance. The need for this equipment was discussed with the patient and he understands, is in agreement, and has not expressed an unwillingness to use the wheelchair. Meds. Prescribed:   Orders Placed This Encounter   Medications    traMADol (ULTRAM) 50 MG tablet     Sig: Take 1 tablet by mouth every 8 hours as needed for Pain for up to 30 days.      Dispense:  90 tablet

## 2019-08-07 DIAGNOSIS — M17.0 PRIMARY OSTEOARTHRITIS OF BOTH KNEES: ICD-10-CM

## 2019-08-07 DIAGNOSIS — G89.4 CHRONIC PAIN SYNDROME: ICD-10-CM

## 2019-08-07 DIAGNOSIS — M47.816 SPONDYLOSIS OF LUMBAR REGION WITHOUT MYELOPATHY OR RADICULOPATHY: ICD-10-CM

## 2019-08-07 DIAGNOSIS — M48.061 SPINAL STENOSIS OF LUMBAR REGION WITHOUT NEUROGENIC CLAUDICATION: ICD-10-CM

## 2019-08-07 DIAGNOSIS — M54.17 LUMBOSACRAL RADICULITIS: ICD-10-CM

## 2019-08-07 DIAGNOSIS — M16.0 PRIMARY OSTEOARTHRITIS OF BOTH HIPS: ICD-10-CM

## 2019-08-07 DIAGNOSIS — M96.1 FAILED BACK SYNDROME OF LUMBAR SPINE: ICD-10-CM

## 2019-08-08 RX ORDER — TRAMADOL HYDROCHLORIDE 50 MG/1
50 TABLET ORAL EVERY 8 HOURS PRN
Qty: 90 TABLET | Refills: 0 | Status: SHIPPED | OUTPATIENT
Start: 2019-08-08 | End: 2019-09-17 | Stop reason: SDUPTHER

## 2019-09-16 DIAGNOSIS — M16.0 PRIMARY OSTEOARTHRITIS OF BOTH HIPS: ICD-10-CM

## 2019-09-16 DIAGNOSIS — M96.1 FAILED BACK SYNDROME OF LUMBAR SPINE: ICD-10-CM

## 2019-09-16 DIAGNOSIS — M54.17 LUMBOSACRAL RADICULITIS: ICD-10-CM

## 2019-09-16 DIAGNOSIS — M48.061 SPINAL STENOSIS OF LUMBAR REGION WITHOUT NEUROGENIC CLAUDICATION: ICD-10-CM

## 2019-09-16 DIAGNOSIS — G89.4 CHRONIC PAIN SYNDROME: ICD-10-CM

## 2019-09-16 DIAGNOSIS — M47.816 SPONDYLOSIS OF LUMBAR REGION WITHOUT MYELOPATHY OR RADICULOPATHY: ICD-10-CM

## 2019-09-16 DIAGNOSIS — M17.0 PRIMARY OSTEOARTHRITIS OF BOTH KNEES: ICD-10-CM

## 2019-09-17 RX ORDER — TRAMADOL HYDROCHLORIDE 50 MG/1
50 TABLET ORAL EVERY 8 HOURS PRN
Qty: 90 TABLET | Refills: 0 | Status: SHIPPED | OUTPATIENT
Start: 2019-09-17 | End: 2019-10-24 | Stop reason: SDUPTHER

## 2019-09-30 ENCOUNTER — OFFICE VISIT (OUTPATIENT)
Dept: PHYSICAL MEDICINE AND REHAB | Age: 79
End: 2019-09-30
Payer: MEDICARE

## 2019-09-30 VITALS
WEIGHT: 184 LBS | HEART RATE: 68 BPM | BODY MASS INDEX: 29.57 KG/M2 | HEIGHT: 66 IN | SYSTOLIC BLOOD PRESSURE: 114 MMHG | DIASTOLIC BLOOD PRESSURE: 60 MMHG

## 2019-09-30 DIAGNOSIS — M47.816 SPONDYLOSIS OF LUMBAR REGION WITHOUT MYELOPATHY OR RADICULOPATHY: ICD-10-CM

## 2019-09-30 DIAGNOSIS — M16.0 PRIMARY OSTEOARTHRITIS OF BOTH HIPS: ICD-10-CM

## 2019-09-30 DIAGNOSIS — M54.17 LUMBOSACRAL RADICULITIS: ICD-10-CM

## 2019-09-30 DIAGNOSIS — G89.4 CHRONIC PAIN SYNDROME: ICD-10-CM

## 2019-09-30 DIAGNOSIS — M48.061 SPINAL STENOSIS OF LUMBAR REGION WITHOUT NEUROGENIC CLAUDICATION: ICD-10-CM

## 2019-09-30 DIAGNOSIS — M96.1 FAILED BACK SYNDROME OF LUMBAR SPINE: Primary | ICD-10-CM

## 2019-09-30 DIAGNOSIS — M17.0 PRIMARY OSTEOARTHRITIS OF BOTH KNEES: ICD-10-CM

## 2019-09-30 PROCEDURE — 99213 OFFICE O/P EST LOW 20 MIN: CPT | Performed by: NURSE PRACTITIONER

## 2019-09-30 ASSESSMENT — ENCOUNTER SYMPTOMS
SHORTNESS OF BREATH: 0
RHINORRHEA: 0
EYE PAIN: 0
SORE THROAT: 0
COUGH: 0
CHEST TIGHTNESS: 0
COLOR CHANGE: 0
DIARRHEA: 0
SINUS PRESSURE: 0
NAUSEA: 0
CONSTIPATION: 0
ABDOMINAL PAIN: 0
WHEEZING: 0
VOMITING: 0
BACK PAIN: 1
PHOTOPHOBIA: 0

## 2019-09-30 NOTE — PROGRESS NOTES
History  Dana Vaca  reports that he has been smoking pipe and cigars. He has never used smokeless tobacco. He reports that he does not drink alcohol or use drugs. Medications    Current Outpatient Medications:     traMADol (ULTRAM) 50 MG tablet, Take 1 tablet by mouth every 8 hours as needed for Pain for up to 30 days. , Disp: 90 tablet, Rfl: 0    Misc. Devices (ROLLER WALKER) MISC, 1 each by Does not apply route daily, Disp: 1 each, Rfl: 0    acetaminophen (TYLENOL) 500 MG tablet, Take 500 mg by mouth daily, Disp: , Rfl:     levothyroxine (SYNTHROID) 75 MCG tablet, Take 75 mcg by mouth Daily, Disp: , Rfl:     fenofibrate 160 MG tablet, Take 160 mg by mouth daily, Disp: , Rfl:     pioglitazone (ACTOS) 15 MG tablet, Take 15 mg by mouth daily, Disp: , Rfl:     aspirin 81 MG tablet, Take 81 mg by mouth daily, Disp: , Rfl:     metoprolol succinate (TOPROL XL) 100 MG extended release tablet, Take 100 mg by mouth daily, Disp: , Rfl:     rosuvastatin (CRESTOR) 10 MG tablet, Take 10 mg by mouth daily, Disp: , Rfl:     amLODIPine (NORVASC) 2.5 MG tablet, Take 2.5 mg by mouth daily, Disp: , Rfl:     donepezil (ARICEPT) 10 MG tablet, Take 10 mg by mouth nightly, Disp: , Rfl:     lansoprazole (PREVACID) 30 MG delayed release capsule, Take 30 mg by mouth daily, Disp: , Rfl:     ferrous sulfate 325 (65 Fe) MG tablet, Take 325 mg by mouth daily (with breakfast), Disp: , Rfl:     Cholecalciferol (VITAMIN D3) 5000 units TABS, Take 5,000 Units by mouth daily, Disp: , Rfl:     ramipril (ALTACE) 5 MG capsule, Take 5 mg by mouth daily. , Disp: , Rfl:     isosorbide mononitrate (IMDUR) 30 MG CR tablet, Take 30 mg by mouth daily. , Disp: , Rfl:     DULoxetine (CYMBALTA) 60 MG capsule, Take 60 mg by mouth daily. , Disp: , Rfl:     insulin NPH (HUMULIN N;NOVOLIN N) 100 UNIT/ML injection, Inject 6-8 Units into the skin 2 times daily (before meals) , Disp: , Rfl:     insulin regular (HUMULIN R;NOVOLIN R) 100 UNIT/ML injection,

## 2019-10-23 DIAGNOSIS — M17.0 PRIMARY OSTEOARTHRITIS OF BOTH KNEES: ICD-10-CM

## 2019-10-23 DIAGNOSIS — M96.1 FAILED BACK SYNDROME OF LUMBAR SPINE: ICD-10-CM

## 2019-10-23 DIAGNOSIS — G89.4 CHRONIC PAIN SYNDROME: ICD-10-CM

## 2019-10-23 DIAGNOSIS — M16.0 PRIMARY OSTEOARTHRITIS OF BOTH HIPS: ICD-10-CM

## 2019-10-23 DIAGNOSIS — M48.061 SPINAL STENOSIS OF LUMBAR REGION WITHOUT NEUROGENIC CLAUDICATION: ICD-10-CM

## 2019-10-23 DIAGNOSIS — M54.17 LUMBOSACRAL RADICULITIS: ICD-10-CM

## 2019-10-23 DIAGNOSIS — M47.816 SPONDYLOSIS OF LUMBAR REGION WITHOUT MYELOPATHY OR RADICULOPATHY: ICD-10-CM

## 2019-10-24 RX ORDER — TRAMADOL HYDROCHLORIDE 50 MG/1
50 TABLET ORAL EVERY 8 HOURS PRN
Qty: 90 TABLET | Refills: 0 | Status: SHIPPED | OUTPATIENT
Start: 2019-10-24 | End: 2019-12-04 | Stop reason: SDUPTHER

## 2019-12-04 DIAGNOSIS — M96.1 FAILED BACK SYNDROME OF LUMBAR SPINE: ICD-10-CM

## 2019-12-04 DIAGNOSIS — M16.0 PRIMARY OSTEOARTHRITIS OF BOTH HIPS: ICD-10-CM

## 2019-12-04 DIAGNOSIS — M48.061 SPINAL STENOSIS OF LUMBAR REGION WITHOUT NEUROGENIC CLAUDICATION: ICD-10-CM

## 2019-12-04 DIAGNOSIS — G89.4 CHRONIC PAIN SYNDROME: ICD-10-CM

## 2019-12-04 DIAGNOSIS — M54.17 LUMBOSACRAL RADICULITIS: ICD-10-CM

## 2019-12-04 DIAGNOSIS — M47.816 SPONDYLOSIS OF LUMBAR REGION WITHOUT MYELOPATHY OR RADICULOPATHY: ICD-10-CM

## 2019-12-04 DIAGNOSIS — M17.0 PRIMARY OSTEOARTHRITIS OF BOTH KNEES: ICD-10-CM

## 2019-12-04 RX ORDER — TRAMADOL HYDROCHLORIDE 50 MG/1
50 TABLET ORAL EVERY 8 HOURS PRN
Qty: 90 TABLET | Refills: 0 | Status: SHIPPED | OUTPATIENT
Start: 2019-12-04 | End: 2020-01-27 | Stop reason: SDUPTHER

## 2019-12-30 ENCOUNTER — OFFICE VISIT (OUTPATIENT)
Dept: PHYSICAL MEDICINE AND REHAB | Age: 79
End: 2019-12-30
Payer: MEDICARE

## 2019-12-30 VITALS
WEIGHT: 189 LBS | HEART RATE: 80 BPM | HEIGHT: 66 IN | DIASTOLIC BLOOD PRESSURE: 60 MMHG | SYSTOLIC BLOOD PRESSURE: 112 MMHG | BODY MASS INDEX: 30.37 KG/M2

## 2019-12-30 DIAGNOSIS — M54.17 LUMBOSACRAL RADICULITIS: ICD-10-CM

## 2019-12-30 DIAGNOSIS — M47.816 SPONDYLOSIS OF LUMBAR REGION WITHOUT MYELOPATHY OR RADICULOPATHY: ICD-10-CM

## 2019-12-30 DIAGNOSIS — G89.4 CHRONIC PAIN SYNDROME: ICD-10-CM

## 2019-12-30 DIAGNOSIS — M17.0 PRIMARY OSTEOARTHRITIS OF BOTH KNEES: ICD-10-CM

## 2019-12-30 DIAGNOSIS — M48.061 SPINAL STENOSIS OF LUMBAR REGION WITHOUT NEUROGENIC CLAUDICATION: ICD-10-CM

## 2019-12-30 DIAGNOSIS — M96.1 FAILED BACK SYNDROME OF LUMBAR SPINE: Primary | ICD-10-CM

## 2019-12-30 DIAGNOSIS — M16.0 PRIMARY OSTEOARTHRITIS OF BOTH HIPS: ICD-10-CM

## 2019-12-30 PROCEDURE — 99213 OFFICE O/P EST LOW 20 MIN: CPT | Performed by: NURSE PRACTITIONER

## 2019-12-30 RX ORDER — TIZANIDINE 2 MG/1
2 TABLET ORAL 2 TIMES DAILY PRN
Qty: 60 TABLET | Refills: 2 | Status: SHIPPED | OUTPATIENT
Start: 2019-12-30 | End: 2020-01-29

## 2019-12-30 ASSESSMENT — ENCOUNTER SYMPTOMS
ABDOMINAL PAIN: 0
VOMITING: 0
EYE PAIN: 0
SORE THROAT: 0
SINUS PRESSURE: 0
DIARRHEA: 0
PHOTOPHOBIA: 0
SHORTNESS OF BREATH: 0
RHINORRHEA: 0
WHEEZING: 0
COLOR CHANGE: 0
CONSTIPATION: 0
NAUSEA: 0
BACK PAIN: 1
CHEST TIGHTNESS: 0
COUGH: 0

## 2020-01-27 RX ORDER — TRAMADOL HYDROCHLORIDE 50 MG/1
50 TABLET ORAL EVERY 8 HOURS PRN
Qty: 90 TABLET | Refills: 0 | Status: SHIPPED | OUTPATIENT
Start: 2020-01-27 | End: 2020-03-06 | Stop reason: SDUPTHER

## 2020-01-27 NOTE — TELEPHONE ENCOUNTER
OARRS reviewed. UDS: + for  Tramadol - consistent       Narcan offered: no       Last seen: 12/30/2019.        Follow-up:   Future Appointments   Date Time Provider Paul Beyer   3/30/2020 11:00 AM DIOGO Gonzalez - CNP [facility] - [place]

## 2020-03-05 NOTE — TELEPHONE ENCOUNTER
Ambar Moseley called requesting a refill on the following medications:  Requested Prescriptions     Pending Prescriptions Disp Refills    traMADol (ULTRAM) 50 MG tablet 90 tablet 0     Sig: Take 1 tablet by mouth every 8 hours as needed for Pain for up to 30 days.      [facility]    Date of last visit: 12/30/19  Date of next visit (if applicable): 5/03/4205

## 2020-03-06 RX ORDER — TRAMADOL HYDROCHLORIDE 50 MG/1
50 TABLET ORAL EVERY 8 HOURS PRN
Qty: 90 TABLET | Refills: 0 | Status: SHIPPED | OUTPATIENT
Start: 2020-03-06 | End: 2020-04-14 | Stop reason: SDUPTHER

## 2020-03-06 NOTE — TELEPHONE ENCOUNTER
OARRS reviewed. UDS: + for  Tramadol consistent. Narcan offered: not offered  Last seen: 12/30/2019.  Follow-up:   Future Appointments   Date Time Provider Paul Beyer   3/30/2020 11:00 AM DIOGO Long - CNP [facility] - [place]

## 2020-04-13 NOTE — TELEPHONE ENCOUNTER
OARRS reviewed. UDS: + for  tramadol. Narcan offered: yes  Last seen: 12/30/2019.  Follow-up: 05/05/2020

## 2020-04-13 NOTE — TELEPHONE ENCOUNTER
Samantha Santo called requesting a refill on the following medications:  Requested Prescriptions     Pending Prescriptions Disp Refills    traMADol (ULTRAM) 50 MG tablet 90 tablet 0     Sig: Take 1 tablet by mouth every 8 hours as needed for Pain for up to 30 days. [facility]      Date of last visit: 3/30/20  Date of next visit (if applicable): 0/0/3287

## 2020-04-14 RX ORDER — TRAMADOL HYDROCHLORIDE 50 MG/1
50 TABLET ORAL EVERY 8 HOURS PRN
Qty: 90 TABLET | Refills: 0 | Status: SHIPPED | OUTPATIENT
Start: 2020-04-14 | End: 2020-06-08 | Stop reason: SDUPTHER

## 2020-04-16 ENCOUNTER — VIRTUAL VISIT (OUTPATIENT)
Dept: PHYSICAL MEDICINE AND REHAB | Age: 80
End: 2020-04-16
Payer: MEDICARE

## 2020-04-16 PROCEDURE — 99213 OFFICE O/P EST LOW 20 MIN: CPT | Performed by: NURSE PRACTITIONER

## 2020-04-16 RX ORDER — TIZANIDINE 2 MG/1
TABLET ORAL
Qty: 60 TABLET | Refills: 1 | Status: SHIPPED | OUTPATIENT
Start: 2020-04-16 | End: 2020-08-31 | Stop reason: SDUPTHER

## 2020-04-16 ASSESSMENT — ENCOUNTER SYMPTOMS
ABDOMINAL PAIN: 0
EYE PAIN: 0
RHINORRHEA: 0
PHOTOPHOBIA: 0
COUGH: 0
WHEEZING: 0
NAUSEA: 0
COLOR CHANGE: 0
SINUS PRESSURE: 0
SORE THROAT: 0
CHEST TIGHTNESS: 0
DIARRHEA: 0
CONSTIPATION: 0
BACK PAIN: 1
SHORTNESS OF BREATH: 0
VOMITING: 0

## 2020-04-16 NOTE — PROGRESS NOTES
(ZANAFLEX) 2 MG tablet     Sig: May take 2-4 mg BID PRN     Dispense:  60 tablet     Refill:  1       Return in about 3 months (around 7/16/2020) for Follow up pain medications. Time spent with patient was 15 minutes, more than 50% was spent Counseling/coordinated the patient'scare.       Electronically signed by Florentino Kawasaki, APRN - CNP on4/16/2020 at 10:27 AM

## 2020-06-08 RX ORDER — TRAMADOL HYDROCHLORIDE 50 MG/1
50 TABLET ORAL EVERY 8 HOURS PRN
Qty: 90 TABLET | Refills: 0 | Status: SHIPPED | OUTPATIENT
Start: 2020-06-08 | End: 2020-07-29 | Stop reason: SDUPTHER

## 2020-07-16 ENCOUNTER — OFFICE VISIT (OUTPATIENT)
Dept: PHYSICAL MEDICINE AND REHAB | Age: 80
End: 2020-07-16
Payer: MEDICARE

## 2020-07-16 VITALS
HEIGHT: 66 IN | BODY MASS INDEX: 30.37 KG/M2 | TEMPERATURE: 97.4 F | DIASTOLIC BLOOD PRESSURE: 66 MMHG | HEART RATE: 62 BPM | WEIGHT: 189 LBS | SYSTOLIC BLOOD PRESSURE: 122 MMHG

## 2020-07-16 PROCEDURE — 99213 OFFICE O/P EST LOW 20 MIN: CPT | Performed by: NURSE PRACTITIONER

## 2020-07-16 ASSESSMENT — ENCOUNTER SYMPTOMS
SHORTNESS OF BREATH: 0
BACK PAIN: 1
WHEEZING: 0
CONSTIPATION: 0
VOMITING: 0
SORE THROAT: 0
NAUSEA: 0
COUGH: 0
EYE PAIN: 0
ABDOMINAL PAIN: 0
COLOR CHANGE: 0
PHOTOPHOBIA: 0
SINUS PRESSURE: 0
CHEST TIGHTNESS: 0
RHINORRHEA: 0
DIARRHEA: 0

## 2020-07-16 NOTE — PROGRESS NOTES
[facility]  [address] [place]  Dept: [phone]  Dept Fax: [phone]: [phone]    Visit Date: 7/16/2020    Functionality Assessment/Goals Worksheet     On a scale of 0 (Does not Interfere) to 10 (Completely Interferes)     1. Which number describes how during the past week pain has interfered with       the following:  A. General Activity:  9  B. Mood: 8  C. Walking Ability:  10  D. Normal Work (Includes both work outside the home and housework):  9  E. Relations with Other People:   7  F. Sleep:   5  G. Enjoyment of Life:   10    2. Patient Prefers to Take their Pain Medications:     [x]  On a regular basis   []  Only when necessary    []  Does not take pain medications    3. What are the Patient's Goals/Expectations for Visiting Pain Management? []  Learn about my pain    []  Receive Medication   []  Physical Therapy     []  Treat Depression   []  Receive Injections    []  Treat Sleep   []  Deal with Anxiety and Stress   []  Treat Opoid Dependence/Addiction   []  Other:    HPI:   Kaylee Altamirano is a 78 y.o. male is here today for    Chief Complaint: Low back pain, Right leg pain, Left leg pain and Hip pain si PAIN   HPI   F/U continues to have low back bilateral Si hip BLE pain. HE continues to take Tramadol Zanaflex. He uses rolator walker for assistance. He has had 2 prior lumbar surgeries. He had SCS permanent placed in 2018 with Dr Huggins Later with continued 40-50% pain releif. Medications reviewed. Patient denies side effects with medications. Patient states he is taking medications as prescribed. Hedenies receiving pain medications from other sources. He denies any ER visits since last visit. Pain scale with out pain medications or at its worst is 7/10. Pain scale with pain medications or at its best is 4/10.   Last dose of  Tramadol  was today  Drug screen reviewed from 12/2019 and was appropriate  Pill count was completed today and was appropriate 32 ct  Patient does not have naloxone available at home. Patient has not required use of naloxone at home since last office visit. The patientis allergic to nsaids. Past Medical History  Glenny Castro  has a past medical history of Arthritis, CAD (coronary artery disease), Depression, Diabetes mellitus ([address] GERD (gastroesophageal reflux disease), Hyperlipidemia, and Thyroid disease. Past Surgical History  The patient  has a past surgical history that includes Cholecystectomy; Carpal tunnel release (Right, 1970); Tooth Extraction (1970); Rotator cuff repair (Left, 1998); Retinopathy surgery (Bilateral, 1997); debridement (Left, 12/2003); cardiovascular stress test (08/2015); Colonoscopy; Upper gastrointestinal endoscopy (2012); Nerve Block Lumb Facet Level 1 Bilateral (Bilateral, 11/27/2017); Nerve Surgery (Bilateral, 02/06/2018); pr inj dx/ther agnt paravert facet joint, lumbar/sac, 1st level (Bilateral, 2/6/2018); other surgical history (Right, 03/05/2018); pr nervous system surgery unlisted (Right, 3/5/2018); other surgical history (Left, 03/27/2018); pr nervous system surgery unlisted (Left, 3/27/2018); eye surgery (2005); back surgery (04/1991); Cardiac catheterization (12/2005); Cardiac catheterization (07/11/2018); pr njx dx/ther sbst intrlmnr lmbr/sac w/img gdn (N/A, 7/19/2018); pr office/outpt visit,procedure only (N/A, 10/12/2018); and IMPLANTATION VAGAL NERVE STIMULATOR (N/A, 12/7/2018). Family History  This patient's family history includes Breast Cancer in his brother; Cancer in his father; Colon Cancer in his father; Diabetes in his brother, father, and paternal grandmother; Heart Disease in his brother, brother, and brother; High Blood Pressure in his mother; Stroke in his mother. Social History  Glenny Castro  reports that he has been smoking pipe and cigars.  He has never used smokeless tobacco. He reports that he does not drink alcohol or use drugs. Medications    Current Outpatient Medications:     tiZANidine (ZANAFLEX) 2 MG tablet, May take 2-4 mg BID PRN, Disp: 60 tablet, Rfl: 1    acetaminophen (TYLENOL) 500 MG tablet, Take 500 mg by mouth daily, Disp: , Rfl:     levothyroxine (SYNTHROID) 75 MCG tablet, Take 75 mcg by mouth Daily, Disp: , Rfl:     fenofibrate 160 MG tablet, Take 160 mg by mouth daily, Disp: , Rfl:     pioglitazone (ACTOS) 15 MG tablet, Take 15 mg by mouth daily, Disp: , Rfl:     aspirin 81 MG tablet, Take 81 mg by mouth daily, Disp: , Rfl:     metoprolol succinate (TOPROL XL) 100 MG extended release tablet, Take 100 mg by mouth daily, Disp: , Rfl:     rosuvastatin (CRESTOR) 10 MG tablet, Take 10 mg by mouth daily, Disp: , Rfl:     amLODIPine (NORVASC) 2.5 MG tablet, Take 2.5 mg by mouth daily, Disp: , Rfl:     donepezil (ARICEPT) 10 MG tablet, Take 10 mg by mouth nightly, Disp: , Rfl:     lansoprazole (PREVACID) 30 MG delayed release capsule, Take 30 mg by mouth daily, Disp: , Rfl:     ferrous sulfate 325 (65 Fe) MG tablet, Take 325 mg by mouth daily (with breakfast), Disp: , Rfl:     Cholecalciferol (VITAMIN D3) 5000 units TABS, Take 5,000 Units by mouth daily, Disp: , Rfl:     ramipril (ALTACE) 5 MG capsule, Take 5 mg by mouth daily. , Disp: , Rfl:     isosorbide mononitrate (IMDUR) 30 MG CR tablet, Take 30 mg by mouth daily. , Disp: , Rfl:     DULoxetine (CYMBALTA) 60 MG capsule, Take 60 mg by mouth daily. , Disp: , Rfl:     insulin NPH (HUMULIN N;NOVOLIN N) 100 UNIT/ML injection, Inject 6-8 Units into the skin 2 times daily (before meals) , Disp: , Rfl:     insulin regular (HUMULIN R;NOVOLIN R) 100 UNIT/ML injection, Inject 5 Units into the skin 2 times daily (before meals) Between 10-12 bid, Disp: , Rfl:     loperamide (IMODIUM) 2 MG capsule, Take 2 mg by mouth as needed. , Disp: , Rfl:     Multiple Vitamins-Minerals (MULTIVITAMIN & MINERAL PO), Take  by mouth daily., Disp: , Rfl:     Misc. Devices (ROLLER WALKER) MISC, 1 each by Does not apply route daily, Disp: 1 each, Rfl: 0    Subjective:      Review of Systems   Constitutional: Positive for activity change. Negative for appetite change, chills, diaphoresis, fatigue, fever and unexpected weight change. HENT: Negative for congestion, ear pain, hearing loss, mouth sores, nosebleeds, rhinorrhea, sinus pressure and sore throat. Eyes: Negative for photophobia, pain and visual disturbance. Respiratory: Negative for cough, chest tightness, shortness of breath and wheezing. Cardiovascular: Negative for chest pain and palpitations. CAD   Gastrointestinal: Negative for abdominal pain, constipation, diarrhea, nausea and vomiting. Endocrine: Negative for cold intolerance, heat intolerance, polydipsia, polyphagia and polyuria. DM Thyroid issues   Genitourinary: Negative for decreased urine volume, difficulty urinating, frequency and hematuria. Musculoskeletal: Positive for arthralgias, back pain, gait problem and myalgias. Negative for neck pain and neck stiffness. Skin: Negative for color change and rash. Allergic/Immunologic: Negative for food allergies and immunocompromised state. Neurological: Positive for weakness and numbness. Negative for dizziness, tremors, seizures, syncope, facial asymmetry, speech difficulty, light-headedness and headaches. Hematological: Does not bruise/bleed easily. Psychiatric/Behavioral: Negative for agitation, behavioral problems, confusion, decreased concentration, dysphoric mood, hallucinations, self-injury, sleep disturbance and suicidal ideas. The patient is not nervous/anxious and is not hyperactive.         Objective:     Vitals:    07/16/20 1424   BP: 122/66   Site: Left Upper Arm   Position: Sitting   Cuff Size: Medium Adult   Pulse: 62   Temp: 97.4 °F (36.3 °C)   TempSrc: Temporal   Weight: 189 lb (85.7 kg)   Height: 5' 6\" (1.676 m)       Physical Memory is impaired. He does not exhibit impaired recent memory or impaired remote memory. Judgment: Judgment normal. Judgment is not impulsive or inappropriate. RONDA test: +  Yeomans test:+  Gaenslen test:+     Assessment:     1. Failed back syndrome of lumbar spine    2. Spondylosis of lumbar region without myelopathy or radiculopathy    3. Spinal stenosis of lumbar region without neurogenic claudication    4. Lumbosacral radiculitis    5. Primary osteoarthritis of both hips    6. Primary osteoarthritis of both knees    7. Chronic pain syndrome            Plan:      · OARRS reviewed. Current MED: 15   · Patient was not offered naloxone for home. · Discussed long term side effects of medications, tolerance, dependency and addiction. · Previous UDS reviewed  · UDS preformed today for compliance. · Patient told can not receive any pain medications from any other source. · No evidence of abuse, diversion or aberrant behavior.  Medications and/or procedures to improve function and quality of life- patient understanding with this and that may not be pain free   Discussed with patient about safe storage of medications at home   Discussed possible weaning of medication dosing dependent on treatment/procedure results.  Testing: none   Procedures: none   Discussed with patient about risks with procedure including infection, reaction to medication, increased pain, or bleeding.  Medications:Tramadol Zanaflex    Permanent SCS with continued 40% pain       Meds. Prescribed:   No orders of the defined types were placed in this encounter. Return in about 3 months (around 10/16/2020) for Follow up pain medications. Time spent with patient was 15 minutes, more than 50% was spent Counseling/coordinated the patient'scare.       Electronically signed by DIOGO Cespedes CNP on7/16/2020 at 2:54 PM

## 2020-07-28 NOTE — TELEPHONE ENCOUNTER
Ronen Mccoy called requesting a refill on the following medications:  Requested Prescriptions     Pending Prescriptions Disp Refills    traMADol (ULTRAM) 50 MG tablet 90 tablet 0     Sig: Take 1 tablet by mouth every 8 hours as needed for Pain for up to 30 days.      Pharmacy verified:  [facility]    Date of last visit: 7/16/20  Date of next visit (if applicable): 78/19/0226

## 2020-07-29 RX ORDER — TRAMADOL HYDROCHLORIDE 50 MG/1
50 TABLET ORAL EVERY 8 HOURS PRN
Qty: 90 TABLET | Refills: 0 | Status: SHIPPED | OUTPATIENT
Start: 2020-07-29 | End: 2020-09-15 | Stop reason: SDUPTHER

## 2020-07-29 NOTE — TELEPHONE ENCOUNTER
OARRS reviewed. UDS: + for  Tramadol - consistent    Last seen: 7/16/2020.      Follow-up:   Future Appointments   Date Time Provider Paul Kayleen   10/21/2020  3:00 PM DIOGO Luke - CNP [facility] - [place]

## 2020-08-27 NOTE — TELEPHONE ENCOUNTER
Alejandra Dillard called requesting a refill on the following medications:  Requested Prescriptions     Pending Prescriptions Disp Refills    tiZANidine (ZANAFLEX) 2 MG tablet 60 tablet 1     Sig: May take 2-4 mg BID PRN     [facility]  [facility]       Date of last visit: 07-16-20  Date of next visit (if applicable): 93/04/6136

## 2020-08-31 RX ORDER — TIZANIDINE 2 MG/1
TABLET ORAL
Qty: 60 TABLET | Refills: 1 | Status: SHIPPED | OUTPATIENT
Start: 2020-08-31 | End: 2020-10-01 | Stop reason: SDUPTHER

## 2020-08-31 NOTE — TELEPHONE ENCOUNTER
Last seen: 7/16/2020.    Follow-up:   Future Appointments   Date Time Provider Paul Kayleen   10/21/2020  3:00 PM DIOGO Benson - CNP [facility] - [place]

## 2020-09-14 NOTE — TELEPHONE ENCOUNTER
Carlitos Lyles called requesting a refill on the following medications:  Requested Prescriptions     Pending Prescriptions Disp Refills    traMADol (ULTRAM) 50 MG tablet 90 tablet 0     Sig: Take 1 tablet by mouth every 8 hours as needed for Pain for up to 30 days.      Ph[facility]:  [facility]       Date of last visit: 0716-20  Date of next visit (if applicable): 36/42/8943

## 2020-09-15 RX ORDER — TRAMADOL HYDROCHLORIDE 50 MG/1
50 TABLET ORAL EVERY 8 HOURS PRN
Qty: 90 TABLET | Refills: 0 | Status: SHIPPED | OUTPATIENT
Start: 2020-09-15 | End: 2020-10-15

## 2020-10-01 RX ORDER — TIZANIDINE 2 MG/1
TABLET ORAL
Qty: 60 TABLET | Refills: 1 | Status: SHIPPED | OUTPATIENT
Start: 2020-10-01 | End: 2020-10-31

## 2020-10-01 NOTE — TELEPHONE ENCOUNTER
OARRS reviewed. UDS: + for  Tramadol consistent. Narcan offered: not offered  Last seen: 7/16/2020.  Follow-up:   Future Appointments   Date Time Provider Paul Beyer   10/21/2020  3:00 PM DIOGO Laura - CNP [facility] - [place]

## 2020-11-02 ENCOUNTER — OFFICE VISIT (OUTPATIENT)
Dept: PHYSICAL MEDICINE AND REHAB | Age: 80
End: 2020-11-02
Payer: MEDICARE

## 2020-11-02 VITALS
SYSTOLIC BLOOD PRESSURE: 118 MMHG | HEIGHT: 66 IN | BODY MASS INDEX: 30.53 KG/M2 | HEART RATE: 90 BPM | DIASTOLIC BLOOD PRESSURE: 60 MMHG | TEMPERATURE: 97 F | WEIGHT: 190 LBS

## 2020-11-02 PROCEDURE — 99213 OFFICE O/P EST LOW 20 MIN: CPT | Performed by: NURSE PRACTITIONER

## 2020-11-02 RX ORDER — TRAMADOL HYDROCHLORIDE 50 MG/1
50 TABLET ORAL EVERY 8 HOURS PRN
Qty: 90 TABLET | Refills: 0 | Status: ON HOLD | OUTPATIENT
Start: 2020-11-02 | End: 2020-12-04 | Stop reason: HOSPADM

## 2020-11-02 RX ORDER — TIZANIDINE 2 MG/1
TABLET ORAL
Qty: 120 TABLET | Refills: 2 | Status: SHIPPED | OUTPATIENT
Start: 2020-11-02

## 2020-11-02 ASSESSMENT — ENCOUNTER SYMPTOMS
CHEST TIGHTNESS: 0
VOMITING: 0
PHOTOPHOBIA: 0
ABDOMINAL PAIN: 0
COUGH: 0
EYE PAIN: 0
SORE THROAT: 0
CONSTIPATION: 0
SINUS PRESSURE: 0
DIARRHEA: 0
WHEEZING: 0
BACK PAIN: 1
NAUSEA: 0
RHINORRHEA: 0
COLOR CHANGE: 0
SHORTNESS OF BREATH: 0

## 2020-11-02 NOTE — PROGRESS NOTES
[facility]  [address]  Dept: [phone]  Dept Fax: [phone]: [phone]    Visit Date: 11/2/2020    Functionality Assessment/Goals Worksheet     On a scale of 0 (Does not Interfere) to 10 (Completely Interferes)     1. Which number describes how during the past week pain has interfered with       the following:  A. General Activity:  8  B. Mood: 3  C. Walking Ability:  8  D. Normal Work (Includes both work outside the home and housework):  8  E. Relations with Other People:   1  F. Sleep:   1  G. Enjoyment of Life:   5    2. Patient Prefers to Take their Pain Medications:     [x]  On a regular basis   []  Only when necessary    []  Does not take pain medications    3. What are the Patient's Goals/Expectations for Visiting Pain Management? [x]  Learn about my pain    []  Receive Medication   []  Physical Therapy     []  Treat Depression   []  Receive Injections    []  Treat Sleep   []  Deal with Anxiety and Stress   []  Treat Opoid Dependence/Addiction   []  Other:    HPI:   Candis Carey is a 78 y.o. male is here today for    Chief Complaint: Low back pain, Mid back pain, Right leg pain, Left leg pain and Hip pain  Si pain   HPI   F/U continues to have low back hip SI  BLE pain. He is here with rolator walker. He continues to take Tramadol Zanaflex. He had SCS placed in 2018 with Dr Fabienne Monroy with continued 40-50% pain relief. He has h/o 2 lumbar surgeries. Medications reviewed. Patient denies side effects with medications. Patient states he is taking medications as prescribed. Hedenies receiving pain medications from other sources. He denies any ER visits since last visit. Pain scale with out pain medications or at its worst is 8/10.walking Pain scale with pain medications or at its best is 5/10.   Last dose of Tramadol was today  Drug screen reviewed from drink alcohol or use drugs. Medications    Current Outpatient Medications:     traMADol (ULTRAM) 50 MG tablet, Take 1 tablet by mouth every 8 hours as needed for Pain for up to 30 days. , Disp: 90 tablet, Rfl: 0    tiZANidine (ZANAFLEX) 2 MG tablet, 1-2 tabs PRN  BID, Disp: 120 tablet, Rfl: 2    Misc. Devices (ROLLER WALKER) MISC, 1 each by Does not apply route daily, Disp: 1 each, Rfl: 0    acetaminophen (TYLENOL) 500 MG tablet, Take 500 mg by mouth daily, Disp: , Rfl:     levothyroxine (SYNTHROID) 75 MCG tablet, Take 75 mcg by mouth Daily, Disp: , Rfl:     fenofibrate 160 MG tablet, Take 160 mg by mouth daily, Disp: , Rfl:     pioglitazone (ACTOS) 15 MG tablet, Take 15 mg by mouth daily, Disp: , Rfl:     aspirin 81 MG tablet, Take 81 mg by mouth daily, Disp: , Rfl:     metoprolol succinate (TOPROL XL) 100 MG extended release tablet, Take 100 mg by mouth daily, Disp: , Rfl:     rosuvastatin (CRESTOR) 10 MG tablet, Take 10 mg by mouth daily, Disp: , Rfl:     amLODIPine (NORVASC) 2.5 MG tablet, Take 2.5 mg by mouth daily, Disp: , Rfl:     donepezil (ARICEPT) 10 MG tablet, Take 10 mg by mouth nightly, Disp: , Rfl:     lansoprazole (PREVACID) 30 MG delayed release capsule, Take 30 mg by mouth daily, Disp: , Rfl:     ferrous sulfate 325 (65 Fe) MG tablet, Take 325 mg by mouth daily (with breakfast), Disp: , Rfl:     Cholecalciferol (VITAMIN D3) 5000 units TABS, Take 5,000 Units by mouth daily, Disp: , Rfl:     ramipril (ALTACE) 5 MG capsule, Take 5 mg by mouth daily. , Disp: , Rfl:     isosorbide mononitrate (IMDUR) 30 MG CR tablet, Take 30 mg by mouth daily. , Disp: , Rfl:     DULoxetine (CYMBALTA) 60 MG capsule, Take 60 mg by mouth daily. , Disp: , Rfl:     insulin NPH (HUMULIN N;NOVOLIN N) 100 UNIT/ML injection, Inject 6-8 Units into the skin 2 times daily (before meals) , Disp: , Rfl:     insulin regular (HUMULIN R;NOVOLIN R) 100 UNIT/ML injection, Inject 5 Units into the skin 2 times daily (before meals) Between 10-12 bid, Disp: , Rfl:     loperamide (IMODIUM) 2 MG capsule, Take 2 mg by mouth as needed. , Disp: , Rfl:     Multiple Vitamins-Minerals (MULTIVITAMIN & MINERAL PO), Take  by mouth daily. , Disp: , Rfl:     Subjective:      Review of Systems   Constitutional: Positive for activity change. Negative for appetite change, chills, diaphoresis, fatigue, fever and unexpected weight change. HENT: Negative for congestion, ear pain, hearing loss, mouth sores, nosebleeds, rhinorrhea, sinus pressure and sore throat. Eyes: Negative for photophobia, pain and visual disturbance. Respiratory: Negative for cough, chest tightness, shortness of breath and wheezing. Cardiovascular: Negative for chest pain and palpitations. CAD   Gastrointestinal: Negative for abdominal pain, constipation, diarrhea, nausea and vomiting. Endocrine: Negative for cold intolerance, heat intolerance, polydipsia, polyphagia and polyuria. DM Thyroid issues   Genitourinary: Negative for decreased urine volume, difficulty urinating, frequency and hematuria. Musculoskeletal: Positive for arthralgias, back pain, gait problem and myalgias. Negative for neck pain and neck stiffness. Skin: Negative for color change and rash. Allergic/Immunologic: Negative for food allergies and immunocompromised state. Neurological: Positive for weakness and numbness. Negative for dizziness, tremors, seizures, syncope, facial asymmetry, speech difficulty, light-headedness and headaches. Hematological: Does not bruise/bleed easily. Psychiatric/Behavioral: Negative for agitation, behavioral problems, confusion, decreased concentration, dysphoric mood, hallucinations, self-injury, sleep disturbance and suicidal ideas. The patient is not nervous/anxious and is not hyperactive.         Objective:     Vitals:    11/02/20 1623   BP: 118/60   Site: Right Upper Arm   Position: Sitting   Cuff Size: Medium Adult   Pulse: 90   Temp: 97 °F (36.1 °C)   TempSrc: Temporal   Weight: 190 lb (86.2 kg)   Height: 5' 6\" (1.676 m)       Physical Exam  Vitals signs and nursing note reviewed. Constitutional:       General: He is not in acute distress. Appearance: He is well-developed. He is not diaphoretic. HENT:      Head: Normocephalic and atraumatic. Right Ear: External ear normal.      Left Ear: External ear normal.      Nose: Nose normal.      Mouth/Throat:      Pharynx: No oropharyngeal exudate. Eyes:      General: No scleral icterus. Right eye: No discharge. Left eye: No discharge. Conjunctiva/sclera: Conjunctivae normal.      Pupils: Pupils are equal, round, and reactive to light. Neck:      Musculoskeletal: Full passive range of motion without pain, normal range of motion and neck supple. Normal range of motion. No edema, erythema, neck rigidity or muscular tenderness. Thyroid: No thyromegaly. Cardiovascular:      Rate and Rhythm: Normal rate and regular rhythm. Heart sounds: Normal heart sounds. No murmur. No friction rub. No gallop. Pulmonary:      Effort: Pulmonary effort is normal. No respiratory distress. Breath sounds: Normal breath sounds. No wheezing or rales. Chest:      Chest wall: No tenderness. Abdominal:      General: Bowel sounds are normal. There is no distension. Palpations: Abdomen is soft. Tenderness: There is no abdominal tenderness. There is no guarding or rebound. Musculoskeletal:         General: Tenderness present. Right shoulder: Normal.      Left shoulder: Normal.      Right wrist: Normal.      Left wrist: Normal.      Right hip: He exhibits decreased range of motion, decreased strength, tenderness and bony tenderness. Left hip: He exhibits decreased range of motion, decreased strength, tenderness and bony tenderness.       Right knee: Normal.      Left knee: Normal.      Right ankle: Normal.      Left ankle: Normal.      Cervical back: Normal.      Thoracic back: He exhibits decreased range of motion, tenderness, bony tenderness, swelling, pain and spasm. Lumbar back: He exhibits decreased range of motion, tenderness, bony tenderness, pain and spasm. Back:       Right upper leg: He exhibits tenderness. Left upper leg: He exhibits tenderness. Skin:     General: Skin is warm. Coloration: Skin is not pale. Findings: No erythema or rash. Neurological:      Mental Status: He is alert and oriented to person, place, and time. He is not disoriented. Cranial Nerves: Cranial nerves are intact. No cranial nerve deficit. Sensory: Sensation is intact. No sensory deficit. Motor: Motor function is intact. No atrophy or abnormal muscle tone. Coordination: Coordination is intact. Coordination normal.      Gait: Gait normal.      Deep Tendon Reflexes: Reflexes are normal and symmetric. Babinski sign absent on the right side. Reflex Scores:       Tricep reflexes are 2+ on the right side and 2+ on the left side. Bicep reflexes are 2+ on the right side and 2+ on the left side. Brachioradialis reflexes are 2+ on the right side and 2+ on the left side. Patellar reflexes are 2+ on the right side and 2+ on the left side. Achilles reflexes are 2+ on the right side and 2+ on the left side. Comments: SLR-  Motor 5/5 BUE BLE   Psychiatric:         Attention and Perception: Attention and perception normal. He is attentive. Mood and Affect: Mood and affect normal. Mood is not anxious or depressed. Affect is not labile, blunt, angry or inappropriate. Speech: Speech normal.         Behavior: Behavior normal. Behavior is not agitated, slowed, aggressive, withdrawn, hyperactive or combative. Behavior is cooperative. Thought Content: Thought content normal. Thought content is not paranoid or delusional. Thought content does not include homicidal or suicidal ideation. Thought content does not include homicidal or suicidal plan. Cognition and Memory: Cognition normal. Memory is impaired. He does not exhibit impaired recent memory or impaired remote memory. Judgment: Judgment normal. Judgment is not impulsive or inappropriate. RONDA test: +  Yeomans test:+  Gaenslen test+     Assessment:     1. Failed back syndrome of lumbar spine    2. Spondylosis of lumbar region without myelopathy or radiculopathy    3. Spinal stenosis of lumbar region without neurogenic claudication    4. Lumbosacral radiculitis    5. Primary osteoarthritis of both hips    6. Primary osteoarthritis of both knees    7. Chronic pain syndrome            Plan:      · OARRS reviewed. Current MED: 15  · Patient was not offered naloxone for home. · Discussed long term side effects of medications, tolerance, dependency and addiction. · Previous UDS reviewed  · UDS preformed today for compliance. · Patient told can not receive any pain medications from any other source. · No evidence of abuse, diversion or aberrant behavior.  Medications and/or procedures to improve function and quality of life- patient understanding with this and that may not be pain free   Discussed with patient about safe storage of medications at home   Discussed possible weaning of medication dosing dependent on treatment/procedure results.  Testing: none   Procedures: none   Discussed with patient about risks with procedure including infection, reaction to medication, increased pain, or bleeding.  Medications:Tramadol Zanaflex      Meds. Prescribed:   Orders Placed This Encounter   Medications    traMADol (ULTRAM) 50 MG tablet     Sig: Take 1 tablet by mouth every 8 hours as needed for Pain for up to 30 days.      Dispense:  90 tablet     Refill:  0     Reduce doses taken as pain becomes manageable    tiZANidine (ZANAFLEX) 2 MG tablet     Si-2 tabs PRN  BID     Dispense:  120 tablet     Refill:  2 Return in about 3 months (around 2/2/2021) for Follow up pain medications. Time spent with patient was  15  minutes, more than 50% was spent Counseling/coordinated the patient'scare.       Electronically signed by DIOGO Oneil CNP on11/2/2020 at 4:43 PM

## 2020-11-03 PROBLEM — M47.816 LUMBAR SPONDYLOSIS: Status: RESOLVED | Noted: 2020-11-03 | Resolved: 2020-11-03

## 2020-11-29 ENCOUNTER — HOSPITAL ENCOUNTER (INPATIENT)
Age: 80
LOS: 4 days | Discharge: HOME HEALTH | DRG: 312 | End: 2020-12-04
Attending: INTERNAL MEDICINE | Admitting: INTERNAL MEDICINE
Payer: MEDICARE

## 2020-11-29 ENCOUNTER — APPOINTMENT (OUTPATIENT)
Dept: GENERAL RADIOLOGY | Age: 80
DRG: 312 | End: 2020-11-29
Payer: MEDICARE

## 2020-11-29 ENCOUNTER — APPOINTMENT (OUTPATIENT)
Dept: CT IMAGING | Age: 80
DRG: 312 | End: 2020-11-29
Payer: MEDICARE

## 2020-11-29 PROBLEM — R55 SYNCOPE AND COLLAPSE: Status: ACTIVE | Noted: 2020-11-29

## 2020-11-29 LAB
ALBUMIN SERPL-MCNC: 3.5 G/DL (ref 3.5–5.1)
ALP BLD-CCNC: 75 U/L (ref 38–126)
ALT SERPL-CCNC: 13 U/L (ref 11–66)
ANION GAP SERPL CALCULATED.3IONS-SCNC: 20 MEQ/L (ref 8–16)
AST SERPL-CCNC: 18 U/L (ref 5–40)
BASOPHILS # BLD: 0.6 %
BASOPHILS ABSOLUTE: 0.1 THOU/MM3 (ref 0–0.1)
BILIRUB SERPL-MCNC: 0.4 MG/DL (ref 0.3–1.2)
BILIRUBIN DIRECT: < 0.2 MG/DL (ref 0–0.3)
BILIRUBIN URINE: NEGATIVE
BLOOD, URINE: NEGATIVE
BUN BLDV-MCNC: 24 MG/DL (ref 7–22)
CALCIUM SERPL-MCNC: 10.4 MG/DL (ref 8.5–10.5)
CHARACTER, URINE: CLEAR
CHLORIDE BLD-SCNC: 94 MEQ/L (ref 98–111)
CO2: 21 MEQ/L (ref 23–33)
COLOR: YELLOW
CREAT SERPL-MCNC: 1.3 MG/DL (ref 0.4–1.2)
EOSINOPHIL # BLD: 0.3 %
EOSINOPHILS ABSOLUTE: 0 THOU/MM3 (ref 0–0.4)
ERYTHROCYTE [DISTWIDTH] IN BLOOD BY AUTOMATED COUNT: 13.4 % (ref 11.5–14.5)
ERYTHROCYTE [DISTWIDTH] IN BLOOD BY AUTOMATED COUNT: 45.6 FL (ref 35–45)
GFR SERPL CREATININE-BSD FRML MDRD: 53 ML/MIN/1.73M2
GLUCOSE BLD-MCNC: 246 MG/DL (ref 70–108)
GLUCOSE BLD-MCNC: 303 MG/DL (ref 70–108)
GLUCOSE BLD-MCNC: 311 MG/DL (ref 70–108)
GLUCOSE BLD-MCNC: 349 MG/DL (ref 70–108)
GLUCOSE URINE: >= 1000 MG/DL
HCT VFR BLD CALC: 41.5 % (ref 42–52)
HEMOGLOBIN: 13.5 GM/DL (ref 14–18)
IMMATURE GRANS (ABS): 0.18 THOU/MM3 (ref 0–0.07)
IMMATURE GRANULOCYTES: 2 %
KETONES, URINE: 15
LACTIC ACID: 2.2 MMOL/L (ref 0.5–2.2)
LEUKOCYTE ESTERASE, URINE: NEGATIVE
LIPASE: 46.6 U/L (ref 5.6–51.3)
LYMPHOCYTES # BLD: 16.4 %
LYMPHOCYTES ABSOLUTE: 1.5 THOU/MM3 (ref 1–4.8)
MCH RBC QN AUTO: 30.4 PG (ref 26–33)
MCHC RBC AUTO-ENTMCNC: 32.5 GM/DL (ref 32.2–35.5)
MCV RBC AUTO: 93.5 FL (ref 80–94)
MONOCYTES # BLD: 7.8 %
MONOCYTES ABSOLUTE: 0.7 THOU/MM3 (ref 0.4–1.3)
NITRITE, URINE: NEGATIVE
NUCLEATED RED BLOOD CELLS: 0 /100 WBC
OSMOLALITY CALCULATION: 288.1 MOSMOL/KG (ref 275–300)
PH UA: 5 (ref 5–9)
PLATELET # BLD: 371 THOU/MM3 (ref 130–400)
PMV BLD AUTO: 10.4 FL (ref 9.4–12.4)
POTASSIUM SERPL-SCNC: 4.9 MEQ/L (ref 3.5–5.2)
PROCALCITONIN: 0.12 NG/ML (ref 0.01–0.09)
PROTEIN UA: NEGATIVE
RBC # BLD: 4.44 MILL/MM3 (ref 4.7–6.1)
SARS-COV-2, NAAT: NOT DETECTED
SEG NEUTROPHILS: 72.9 %
SEGMENTED NEUTROPHILS ABSOLUTE COUNT: 6.6 THOU/MM3 (ref 1.8–7.7)
SODIUM BLD-SCNC: 135 MEQ/L (ref 135–145)
SPECIFIC GRAVITY, URINE: > 1.03 (ref 1–1.03)
TOTAL PROTEIN: 7 G/DL (ref 6.1–8)
TROPONIN T: < 0.01 NG/ML
TSH SERPL DL<=0.05 MIU/L-ACNC: 0.41 UIU/ML (ref 0.4–4.2)
UROBILINOGEN, URINE: 0.2 EU/DL (ref 0–1)
WBC # BLD: 9 THOU/MM3 (ref 4.8–10.8)

## 2020-11-29 PROCEDURE — 85025 COMPLETE CBC W/AUTO DIFF WBC: CPT

## 2020-11-29 PROCEDURE — U0002 COVID-19 LAB TEST NON-CDC: HCPCS

## 2020-11-29 PROCEDURE — 96361 HYDRATE IV INFUSION ADD-ON: CPT

## 2020-11-29 PROCEDURE — 96360 HYDRATION IV INFUSION INIT: CPT

## 2020-11-29 PROCEDURE — 84145 PROCALCITONIN (PCT): CPT

## 2020-11-29 PROCEDURE — 82948 REAGENT STRIP/BLOOD GLUCOSE: CPT

## 2020-11-29 PROCEDURE — 2580000003 HC RX 258: Performed by: EMERGENCY MEDICINE

## 2020-11-29 PROCEDURE — 70450 CT HEAD/BRAIN W/O DYE: CPT

## 2020-11-29 PROCEDURE — 96372 THER/PROPH/DIAG INJ SC/IM: CPT

## 2020-11-29 PROCEDURE — 81003 URINALYSIS AUTO W/O SCOPE: CPT

## 2020-11-29 PROCEDURE — G0378 HOSPITAL OBSERVATION PER HR: HCPCS

## 2020-11-29 PROCEDURE — 2580000003 HC RX 258: Performed by: INTERNAL MEDICINE

## 2020-11-29 PROCEDURE — 84443 ASSAY THYROID STIM HORMONE: CPT

## 2020-11-29 PROCEDURE — 80053 COMPREHEN METABOLIC PANEL: CPT

## 2020-11-29 PROCEDURE — 72125 CT NECK SPINE W/O DYE: CPT

## 2020-11-29 PROCEDURE — 71045 X-RAY EXAM CHEST 1 VIEW: CPT

## 2020-11-29 PROCEDURE — 83605 ASSAY OF LACTIC ACID: CPT

## 2020-11-29 PROCEDURE — 93005 ELECTROCARDIOGRAM TRACING: CPT | Performed by: INTERNAL MEDICINE

## 2020-11-29 PROCEDURE — 84484 ASSAY OF TROPONIN QUANT: CPT

## 2020-11-29 PROCEDURE — 6360000002 HC RX W HCPCS: Performed by: INTERNAL MEDICINE

## 2020-11-29 PROCEDURE — 82248 BILIRUBIN DIRECT: CPT

## 2020-11-29 PROCEDURE — 36415 COLL VENOUS BLD VENIPUNCTURE: CPT

## 2020-11-29 PROCEDURE — 6370000000 HC RX 637 (ALT 250 FOR IP): Performed by: INTERNAL MEDICINE

## 2020-11-29 PROCEDURE — 99285 EMERGENCY DEPT VISIT HI MDM: CPT

## 2020-11-29 PROCEDURE — 83690 ASSAY OF LIPASE: CPT

## 2020-11-29 RX ORDER — NICOTINE POLACRILEX 4 MG
15 LOZENGE BUCCAL PRN
Status: DISCONTINUED | OUTPATIENT
Start: 2020-11-29 | End: 2020-11-29

## 2020-11-29 RX ORDER — SODIUM CHLORIDE 450 MG/100ML
INJECTION, SOLUTION INTRAVENOUS CONTINUOUS
Status: DISCONTINUED | OUTPATIENT
Start: 2020-11-29 | End: 2020-12-04 | Stop reason: HOSPADM

## 2020-11-29 RX ORDER — ROSUVASTATIN CALCIUM 10 MG/1
10 TABLET, COATED ORAL DAILY
Status: DISCONTINUED | OUTPATIENT
Start: 2020-11-29 | End: 2020-12-04

## 2020-11-29 RX ORDER — PIOGLITAZONEHYDROCHLORIDE 15 MG/1
15 TABLET ORAL DAILY
Status: DISCONTINUED | OUTPATIENT
Start: 2020-11-29 | End: 2020-12-04 | Stop reason: HOSPADM

## 2020-11-29 RX ORDER — TRAMADOL HYDROCHLORIDE 50 MG/1
50 TABLET ORAL EVERY 8 HOURS PRN
Status: DISCONTINUED | OUTPATIENT
Start: 2020-11-29 | End: 2020-12-04 | Stop reason: HOSPADM

## 2020-11-29 RX ORDER — ONDANSETRON 2 MG/ML
4 INJECTION INTRAMUSCULAR; INTRAVENOUS EVERY 6 HOURS PRN
Status: DISCONTINUED | OUTPATIENT
Start: 2020-11-29 | End: 2020-12-04 | Stop reason: HOSPADM

## 2020-11-29 RX ORDER — DEXTROSE MONOHYDRATE 50 MG/ML
100 INJECTION, SOLUTION INTRAVENOUS PRN
Status: DISCONTINUED | OUTPATIENT
Start: 2020-11-29 | End: 2020-12-04 | Stop reason: HOSPADM

## 2020-11-29 RX ORDER — CLOPIDOGREL BISULFATE 75 MG/1
75 TABLET ORAL DAILY
Status: DISCONTINUED | OUTPATIENT
Start: 2020-11-29 | End: 2020-12-04 | Stop reason: HOSPADM

## 2020-11-29 RX ORDER — TIZANIDINE 4 MG/1
2 TABLET ORAL EVERY 8 HOURS PRN
Status: DISCONTINUED | OUTPATIENT
Start: 2020-11-29 | End: 2020-12-04 | Stop reason: HOSPADM

## 2020-11-29 RX ORDER — PANTOPRAZOLE SODIUM 40 MG/1
40 TABLET, DELAYED RELEASE ORAL
Status: DISCONTINUED | OUTPATIENT
Start: 2020-11-30 | End: 2020-11-29

## 2020-11-29 RX ORDER — DEXTROSE MONOHYDRATE 50 MG/ML
100 INJECTION, SOLUTION INTRAVENOUS PRN
Status: DISCONTINUED | OUTPATIENT
Start: 2020-11-29 | End: 2020-11-29

## 2020-11-29 RX ORDER — ACETAMINOPHEN 500 MG
500 TABLET ORAL DAILY
Status: DISCONTINUED | OUTPATIENT
Start: 2020-11-30 | End: 2020-12-04 | Stop reason: HOSPADM

## 2020-11-29 RX ORDER — FENOFIBRATE 160 MG/1
160 TABLET ORAL DAILY
Status: DISCONTINUED | OUTPATIENT
Start: 2020-11-29 | End: 2020-12-04

## 2020-11-29 RX ORDER — NICOTINE POLACRILEX 4 MG
15 LOZENGE BUCCAL PRN
Status: DISCONTINUED | OUTPATIENT
Start: 2020-11-29 | End: 2020-12-04 | Stop reason: HOSPADM

## 2020-11-29 RX ORDER — DEXTROSE MONOHYDRATE 25 G/50ML
12.5 INJECTION, SOLUTION INTRAVENOUS PRN
Status: DISCONTINUED | OUTPATIENT
Start: 2020-11-29 | End: 2020-11-29

## 2020-11-29 RX ORDER — SODIUM CHLORIDE 0.9 % (FLUSH) 0.9 %
10 SYRINGE (ML) INJECTION EVERY 12 HOURS SCHEDULED
Status: DISCONTINUED | OUTPATIENT
Start: 2020-11-29 | End: 2020-12-04 | Stop reason: HOSPADM

## 2020-11-29 RX ORDER — DULOXETIN HYDROCHLORIDE 60 MG/1
60 CAPSULE, DELAYED RELEASE ORAL NIGHTLY
Status: DISCONTINUED | OUTPATIENT
Start: 2020-11-29 | End: 2020-12-04

## 2020-11-29 RX ORDER — POLYETHYLENE GLYCOL 3350 17 G/17G
17 POWDER, FOR SOLUTION ORAL DAILY PRN
Status: DISCONTINUED | OUTPATIENT
Start: 2020-11-29 | End: 2020-12-04 | Stop reason: HOSPADM

## 2020-11-29 RX ORDER — ALPRAZOLAM 0.5 MG/1
0.5 TABLET ORAL EVERY 6 HOURS PRN
Status: DISCONTINUED | OUTPATIENT
Start: 2020-11-29 | End: 2020-12-04

## 2020-11-29 RX ORDER — SODIUM CHLORIDE 0.9 % (FLUSH) 0.9 %
10 SYRINGE (ML) INJECTION PRN
Status: DISCONTINUED | OUTPATIENT
Start: 2020-11-29 | End: 2020-12-04 | Stop reason: HOSPADM

## 2020-11-29 RX ORDER — CLOPIDOGREL BISULFATE 75 MG/1
75 TABLET ORAL DAILY
COMMUNITY
End: 2022-08-17 | Stop reason: SDUPTHER

## 2020-11-29 RX ORDER — ASPIRIN 81 MG/1
81 TABLET, CHEWABLE ORAL NIGHTLY
Status: DISCONTINUED | OUTPATIENT
Start: 2020-11-29 | End: 2020-12-04 | Stop reason: HOSPADM

## 2020-11-29 RX ORDER — DONEPEZIL HYDROCHLORIDE 10 MG/1
10 TABLET, FILM COATED ORAL NIGHTLY
Status: DISCONTINUED | OUTPATIENT
Start: 2020-11-29 | End: 2020-12-02

## 2020-11-29 RX ORDER — ACETAMINOPHEN 500 MG
500 TABLET ORAL DAILY
Status: DISCONTINUED | OUTPATIENT
Start: 2020-11-29 | End: 2020-11-29

## 2020-11-29 RX ORDER — RAMIPRIL 5 MG/1
5 CAPSULE ORAL DAILY
Status: DISCONTINUED | OUTPATIENT
Start: 2020-11-30 | End: 2020-11-30

## 2020-11-29 RX ORDER — DEXTROSE MONOHYDRATE 25 G/50ML
12.5 INJECTION, SOLUTION INTRAVENOUS PRN
Status: DISCONTINUED | OUTPATIENT
Start: 2020-11-29 | End: 2020-12-04 | Stop reason: HOSPADM

## 2020-11-29 RX ORDER — ACETAMINOPHEN 650 MG/1
650 SUPPOSITORY RECTAL EVERY 6 HOURS PRN
Status: DISCONTINUED | OUTPATIENT
Start: 2020-11-29 | End: 2020-12-04 | Stop reason: HOSPADM

## 2020-11-29 RX ORDER — PROMETHAZINE HYDROCHLORIDE 25 MG/1
12.5 TABLET ORAL EVERY 6 HOURS PRN
Status: DISCONTINUED | OUTPATIENT
Start: 2020-11-29 | End: 2020-12-04 | Stop reason: HOSPADM

## 2020-11-29 RX ORDER — FERROUS SULFATE 325(65) MG
325 TABLET ORAL
Status: DISCONTINUED | OUTPATIENT
Start: 2020-11-30 | End: 2020-12-04 | Stop reason: HOSPADM

## 2020-11-29 RX ORDER — 0.9 % SODIUM CHLORIDE 0.9 %
500 INTRAVENOUS SOLUTION INTRAVENOUS ONCE
Status: COMPLETED | OUTPATIENT
Start: 2020-11-29 | End: 2020-11-29

## 2020-11-29 RX ORDER — LOPERAMIDE HYDROCHLORIDE 2 MG/1
2 CAPSULE ORAL 4 TIMES DAILY PRN
Status: DISCONTINUED | OUTPATIENT
Start: 2020-11-29 | End: 2020-12-04 | Stop reason: HOSPADM

## 2020-11-29 RX ORDER — VITAMIN B COMPLEX
5000 TABLET ORAL DAILY
Status: DISCONTINUED | OUTPATIENT
Start: 2020-11-29 | End: 2020-12-04 | Stop reason: HOSPADM

## 2020-11-29 RX ORDER — ACETAMINOPHEN 325 MG/1
650 TABLET ORAL EVERY 6 HOURS PRN
Status: DISCONTINUED | OUTPATIENT
Start: 2020-11-29 | End: 2020-12-04 | Stop reason: HOSPADM

## 2020-11-29 RX ORDER — METOPROLOL SUCCINATE 100 MG/1
100 TABLET, EXTENDED RELEASE ORAL DAILY
Status: DISCONTINUED | OUTPATIENT
Start: 2020-11-29 | End: 2020-12-04 | Stop reason: HOSPADM

## 2020-11-29 RX ORDER — LEVOTHYROXINE SODIUM 0.07 MG/1
75 TABLET ORAL DAILY
Status: DISCONTINUED | OUTPATIENT
Start: 2020-11-29 | End: 2020-12-04 | Stop reason: HOSPADM

## 2020-11-29 RX ADMIN — Medication 5000 UNITS: at 14:58

## 2020-11-29 RX ADMIN — SODIUM CHLORIDE 500 ML: 9 INJECTION, SOLUTION INTRAVENOUS at 06:35

## 2020-11-29 RX ADMIN — DULOXETINE HYDROCHLORIDE 60 MG: 60 CAPSULE, DELAYED RELEASE ORAL at 21:31

## 2020-11-29 RX ADMIN — INSULIN LISPRO 8 UNITS: 100 INJECTION, SOLUTION INTRAVENOUS; SUBCUTANEOUS at 17:52

## 2020-11-29 RX ADMIN — ASPIRIN 81 MG: 81 TABLET, CHEWABLE ORAL at 21:30

## 2020-11-29 RX ADMIN — TRAMADOL HYDROCHLORIDE 50 MG: 50 TABLET ORAL at 19:26

## 2020-11-29 RX ADMIN — SODIUM CHLORIDE: 4.5 INJECTION, SOLUTION INTRAVENOUS at 21:27

## 2020-11-29 RX ADMIN — ENOXAPARIN SODIUM 40 MG: 40 INJECTION SUBCUTANEOUS at 14:58

## 2020-11-29 RX ADMIN — METOPROLOL SUCCINATE 100 MG: 100 TABLET, FILM COATED, EXTENDED RELEASE ORAL at 14:58

## 2020-11-29 RX ADMIN — CLOPIDOGREL BISULFATE 75 MG: 75 TABLET, FILM COATED ORAL at 14:58

## 2020-11-29 RX ADMIN — PIOGLITAZONE 15 MG: 15 TABLET ORAL at 14:58

## 2020-11-29 RX ADMIN — ROSUVASTATIN CALCIUM 10 MG: 10 TABLET, FILM COATED ORAL at 14:58

## 2020-11-29 RX ADMIN — DONEPEZIL HYDROCHLORIDE 10 MG: 10 TABLET, FILM COATED ORAL at 21:30

## 2020-11-29 RX ADMIN — FENOFIBRATE 160 MG: 160 TABLET, FILM COATED ORAL at 14:58

## 2020-11-29 ASSESSMENT — ENCOUNTER SYMPTOMS
VOMITING: 0
DIARRHEA: 0
SHORTNESS OF BREATH: 0
COLOR CHANGE: 0
BACK PAIN: 1
NAUSEA: 0
COUGH: 0

## 2020-11-29 ASSESSMENT — PAIN DESCRIPTION - PAIN TYPE: TYPE: ACUTE PAIN

## 2020-11-29 ASSESSMENT — PAIN SCALES - GENERAL
PAINLEVEL_OUTOF10: 7
PAINLEVEL_OUTOF10: 0
PAINLEVEL_OUTOF10: 0

## 2020-11-29 ASSESSMENT — PAIN DESCRIPTION - DESCRIPTORS: DESCRIPTORS: ACHING;HEADACHE

## 2020-11-29 ASSESSMENT — PAIN DESCRIPTION - ONSET: ONSET: ON-GOING

## 2020-11-29 ASSESSMENT — PAIN DESCRIPTION - PROGRESSION: CLINICAL_PROGRESSION: GRADUALLY WORSENING

## 2020-11-29 ASSESSMENT — PAIN - FUNCTIONAL ASSESSMENT: PAIN_FUNCTIONAL_ASSESSMENT: ACTIVITIES ARE NOT PREVENTED

## 2020-11-29 ASSESSMENT — PAIN DESCRIPTION - ORIENTATION: ORIENTATION: ANTERIOR

## 2020-11-29 ASSESSMENT — PAIN DESCRIPTION - FREQUENCY: FREQUENCY: INTERMITTENT

## 2020-11-29 ASSESSMENT — PAIN DESCRIPTION - LOCATION: LOCATION: HEAD

## 2020-11-29 NOTE — ED TRIAGE NOTES
Pt presents to the ED via EMS with c/o syncope after waking up to use the restroom. EMS report that syncope was witness by pts wife and lasted approx. 30 seconds. Upon arrival pt is alert to name and place. Pt states he does not remember losing consciousness. Pt incontinent of stool and urine upon arrival. VSS, respirations even and unlabored.

## 2020-11-29 NOTE — ED NOTES
Urine glucose greater/equal than 1000, urine ketones at [address], [address] [facility]  11/29/20 6115

## 2020-11-29 NOTE — PROGRESS NOTES
Patient admitted to 4A Room 11 from ED  Complaint upon arrival to the room none  IV site free of s/s of infection or infiltration. Vital signs obtained. Assessment and data collection initiated. Oriented to room. Policies and procedures for 4a explained All questions answered with no further questions at this time. Fall prevention and safety brochure discussed with patient. 2 person skin check completed. Patient declines PCP notification  Patient declines family notification.  Wife, Tere Dhillon, at bedside    Name & number of designated person to update during visitor restriction times:

## 2020-11-29 NOTE — ED PROVIDER NOTES
Reduce doses taken as pain becomes manageable  Associated Diagnoses: Failed back syndrome of lumbar spine; Spondylosis of lumbar region without myelopathy or radiculopathy; Spinal stenosis of lumbar region without neurogenic claudication; Lumbosacral radiculitis; Primary osteoarthritis of both hips; Primary osteoarthritis of both knees; Chronic pain syndrome      tiZANidine (ZANAFLEX) 2 MG tablet 1-2 tabs PRN  BID  Qty: 120 tablet, Refills: 2      Misc. Devices (ROLLER WALKER) MISC 1 each by Does not apply route daily  Qty: 1 each, Refills: 0    Associated Diagnoses: Failed back syndrome of lumbar spine; Spondylosis of lumbar region without myelopathy or radiculopathy; Spinal stenosis of lumbar region without neurogenic claudication; Chronic pain syndrome      acetaminophen (TYLENOL) 500 MG tablet Take 500 mg by mouth daily      levothyroxine (SYNTHROID) 75 MCG tablet Take 75 mcg by mouth Daily      fenofibrate 160 MG tablet Take 160 mg by mouth daily      pioglitazone (ACTOS) 15 MG tablet Take 15 mg by mouth daily      aspirin 81 MG tablet Take 81 mg by mouth daily      metoprolol succinate (TOPROL XL) 100 MG extended release tablet Take 100 mg by mouth daily      rosuvastatin (CRESTOR) 10 MG tablet Take 10 mg by mouth daily      amLODIPine (NORVASC) 2.5 MG tablet Take 2.5 mg by mouth daily      donepezil (ARICEPT) 10 MG tablet Take 10 mg by mouth nightly      lansoprazole (PREVACID) 30 MG delayed release capsule Take 30 mg by mouth daily      ferrous sulfate 325 (65 Fe) MG tablet Take 325 mg by mouth daily (with breakfast)      Cholecalciferol (VITAMIN D3) 5000 units TABS Take 5,000 Units by mouth daily      ramipril (ALTACE) 5 MG capsule Take 5 mg by mouth daily. isosorbide mononitrate (IMDUR) 30 MG CR tablet Take 30 mg by mouth daily. DULoxetine (CYMBALTA) 60 MG capsule Take 60 mg by mouth daily.       insulin NPH (HUMULIN N;NOVOLIN N) 100 UNIT/ML injection Inject 6-8 Units into the skin 2 times daily (before meals)       insulin regular (HUMULIN R;NOVOLIN R) 100 UNIT/ML injection Inject 5 Units into the skin 2 times daily (before meals) Between 10-12 bid      loperamide (IMODIUM) 2 MG capsule Take 2 mg by mouth as needed. Multiple Vitamins-Minerals (MULTIVITAMIN & MINERAL PO) Take  by mouth daily. ALLERGIES     is allergic to nsaids. FAMILY HISTORY     He indicated that his mother is . He indicated that his father is . He indicated that two of his three brothers are alive. He indicated that the status of his paternal grandmother is unknown.   family history includes Breast Cancer in his brother; Cancer in his father; Colon Cancer in his father; Diabetes in his brother, father, and paternal grandmother; Heart Disease in his brother, brother, and brother; High Blood Pressure in his mother; Stroke in his mother. SOCIAL HISTORY      reports that he has been smoking pipe and cigars. He has never used smokeless tobacco. He reports that he does not drink alcohol or use drugs. PHYSICAL EXAM     INITIAL VITALS:  height is 5' 6\" (1.676 m) and weight is 190 lb (86.2 kg). His axillary temperature is 97.6 °F (36.4 °C). His blood pressure is 110/58 (abnormal) and his pulse is 87. His respiration is 14 and oxygen saturation is 99%. Physical Exam  Constitutional:       General: He is not in acute distress. Appearance: He is normal weight. He is not ill-appearing. HENT:      Head: Normocephalic and atraumatic. Nose: Nose normal.      Mouth/Throat:      Mouth: Mucous membranes are moist. No injury or oral lesions. Tongue: No lesions. Pharynx: Oropharynx is clear. No oropharyngeal exudate or posterior oropharyngeal erythema. Cardiovascular:      Rate and Rhythm: Normal rate and regular rhythm. Pulses: Normal pulses. Heart sounds: Normal heart sounds. Pulmonary:      Effort: Pulmonary effort is normal. No respiratory distress.       Breath sounds: Normal breath sounds. No wheezing. Abdominal:      General: Abdomen is flat. Bowel sounds are normal. There is no distension. Tenderness: There is no abdominal tenderness. Skin:     General: Skin is warm and dry. Capillary Refill: Capillary refill takes less than 2 seconds. Coloration: Skin is pale. Skin is not jaundiced. Findings: No bruising. Neurological:      Mental Status: He is alert. Mental status is at baseline. GCS: GCS eye subscore is 4. GCS verbal subscore is 5. GCS motor subscore is 6. Comments: Oriented to name and place, states \"[place]\" when asked president   Psychiatric:         Mood and Affect: Mood normal.          DIFFERENTIAL DIAGNOSIS:   Vasovagal syncope, dehydration, arrhythmia, ACS, less likely sepsis, intracranial hemorrhage, COVID-19, UTI, orthostatic hypotension    DIAGNOSTIC RESULTS     EKG: All EKG's are interpreted by the Emergency Department Physician who either signs or Co-signs this chart in the absence of a cardiologist.    Normal sinus rhythm ventricular rate 82 bpm.  DE interval 160, QRS duration 90, corrected  ms. No STEMI    RADIOLOGY: non-plainfilm images(s) such as CT, Ultrasound and MRI are read by the radiologist.    CT HEAD WO CONTRAST   Final Result   Generalized involutional changes noted, particularly prominent    frontotemporal atrophy. No acute abnormality identified. This document has been electronically signed by: Farhat Enciso MD    on 11/29/2020 07:07 AM      All CT scans at this facility use dose modulation, iterative    reconstruction, and/or weight-based   dosing when appropriate to reduce radiation dose to as low as reasonably    achievable. CT CERVICAL SPINE WO CONTRAST   Final Result   No fracture or misalignment identified. Degenerative changes with    particular right C4-5 foraminal stenosis.       This document has been electronically signed by: Farhat Enciso MD    on 11/29/2020 07:05 AM All CT scans at this facility use dose modulation, iterative    reconstruction, and/or weight-based   dosing when appropriate to reduce radiation dose to as low as reasonably    achievable. XR CHEST PORTABLE   Final Result   1. No acute findings. This document has been electronically signed by:  Nicho Grant MD on 11/29/2020 06:41 AM             LABS:     Labs Reviewed   BASIC METABOLIC PANEL - Abnormal; Notable for the following components:       Result Value    Chloride 94 (*)     CO2 21 (*)     Glucose 349 (*)     BUN 24 (*)     CREATININE 1.3 (*)     All other components within normal limits   CBC WITH AUTO DIFFERENTIAL - Abnormal; Notable for the following components:    RBC 4.44 (*)     Hemoglobin 13.5 (*)     Hematocrit 41.5 (*)     RDW-SD 45.6 (*)     Immature Grans (Abs) 0.18 (*)     All other components within normal limits   URINE RT REFLEX TO CULTURE - Abnormal; Notable for the following components:    Glucose, Ur >= 1000 (*)     Ketones, Urine 15 (*)     Specific Gravity, Urine > 1.030 (*)     All other components within normal limits   PROCALCITONIN - Abnormal; Notable for the following components:    Procalcitonin 0.12 (*)     All other components within normal limits   ANION GAP - Abnormal; Notable for the following components:    Anion Gap 20.0 (*)     All other components within normal limits   GLOMERULAR FILTRATION RATE, ESTIMATED - Abnormal; Notable for the following components:    Est, Glom Filt Rate 53 (*)     All other components within normal limits   TROPONIN   TSH WITHOUT REFLEX   LACTIC ACID, PLASMA   HEPATIC FUNCTION PANEL   LIPASE   OSMOLALITY   COVID-19       EMERGENCY DEPARTMENT COURSE:   Vitals:    Vitals:    11/29/20 0642 11/29/20 0753 11/29/20 0858 11/29/20 0903   BP:  118/66  (!) 110/58   Pulse: 90 82  87   Resp: 11 10  14   Temp:    97.6 °F (36.4 °C)   TempSrc:    Axillary   SpO2: 97% 97%  99%   Weight:       Height:   5' 6\" (1.676 m)        6:05 AM being seen independently by myself.           Kayden Griffin, APRN - CNP  11/29/20 2829

## 2020-11-29 NOTE — ED NOTES
Pt and spouse updated on covid neg results. Urine obtained and sent to lab.       Pillo Kelly RN  11/29/20 3620

## 2020-11-29 NOTE — ED NOTES
Called 4A and informed [facility] that the patient will be on their way to the unit shortly. Patient will be transported via bed in a stable condition.       Leopoldo Kiel  11/29/20 2515

## 2020-11-29 NOTE — ED NOTES
Upon first contact with patient this RN receives bedside shift report NAOMY Hall. Pt alert and oriented x4. Breathing easy and unlabored on RA. Vitals updated. Warm blankets applied.         Torsten Alvarado RN  11/29/20 6157

## 2020-11-29 NOTE — FLOWSHEET NOTE
11/29/20 1244   Provider Notification   Reason for Communication Evaluate   Provider Name Dr. Sunday Hall   Provider Notification Physician   Method of Communication Call   Response See orders   Notification Time [phone]     Call placed to Dr. Sunday Hall requesting orders.

## 2020-11-29 NOTE — ED NOTES
Bed: 005A  Expected date: 11/29/20  Expected time: 5:25 AM  Means of arrival: [facility]  Comments:     Sonali Lao RN  11/29/20 0005

## 2020-11-29 NOTE — FLOWSHEET NOTE
11/29/20 1748   Provider Notification   Reason for Communication Evaluate   Provider Name Dr. Milagro Fisher   Provider Notification Physician   Method of Communication Call  (no answer)   Response Waiting for response   Notification Time [address]     Updated Dr. Milagro Fisher that pt is tachycardic -130 at rest. No new orders.

## 2020-11-29 NOTE — ED NOTES
Katiuska Robles NP at bedside w/student to update on POC and admission process.       Torsten Alvarado, RN  11/29/20 0797

## 2020-11-29 NOTE — ED NOTES
Pt and spouse updated on new orders for covid r/o. Specimen obtained and sent to lab. Pt tolerated w/some discomfort. Additional blankets provided. Droplet precautions initiated. Will continue to monitor for safety and comfort.       Torsten Alvarado RN  11/29/20 1634

## 2020-11-29 NOTE — ED NOTES
Left message for spouse on mobile and home number w/new room assignment to 4A.       Torsten Alvarado, RN  11/29/20 2392

## 2020-11-29 NOTE — ED NOTES
ED to inpatient nurses report    Chief Complaint   Patient presents with    Fall    Loss of Consciousness      Present to ED from home  LOC: alert and orientated to name and place  Vital signs   Vitals:    11/29/20 0546 11/29/20 0642 11/29/20 0753   BP: 104/65  118/66   Pulse: 81 90 82   Resp: 28 11 10   Temp: 97.6 °F (36.4 °C)     TempSrc: Oral     SpO2:  97% 97%   Weight: 190 lb (86.2 kg)     Height: 5' 6\" (1.676 m)        Oxygen Baseline RA    Current needs required none   Bipap/Cpap No  LDAs:   Peripheral IV 11/29/20 Right Wrist (Active)   Site Assessment Clean;Dry; Intact 11/29/20 0756   Line Status Infusing 11/29/20 0756   Dressing Status Clean;Dry; Intact 11/29/20 0756     Mobility: Requires assistance * 1  Pending ED orders: none    Present condition: stable  Person of contract; spouse; Luanne Settles; please see demographics for mobile and home contact number  Our promise was given to patient    Electronically signed by Pillo Kelly RN on 11/29/2020 at 8:43 AM       Pillo Kelly RN  11/29/20 4871

## 2020-11-30 LAB
BILIRUBIN URINE: NEGATIVE
BLOOD, URINE: NEGATIVE
CHARACTER, URINE: CLEAR
COLOR: YELLOW
EKG ATRIAL RATE: 124 BPM
EKG ATRIAL RATE: 82 BPM
EKG ATRIAL RATE: 91 BPM
EKG P AXIS: 59 DEGREES
EKG P AXIS: 59 DEGREES
EKG P AXIS: 64 DEGREES
EKG P-R INTERVAL: 154 MS
EKG P-R INTERVAL: 160 MS
EKG P-R INTERVAL: 162 MS
EKG Q-T INTERVAL: 320 MS
EKG Q-T INTERVAL: 378 MS
EKG Q-T INTERVAL: 410 MS
EKG QRS DURATION: 84 MS
EKG QRS DURATION: 88 MS
EKG QRS DURATION: 90 MS
EKG QTC CALCULATION (BAZETT): 459 MS
EKG QTC CALCULATION (BAZETT): 464 MS
EKG QTC CALCULATION (BAZETT): 479 MS
EKG R AXIS: -19 DEGREES
EKG R AXIS: -38 DEGREES
EKG R AXIS: -44 DEGREES
EKG T AXIS: 52 DEGREES
EKG T AXIS: 75 DEGREES
EKG T AXIS: 90 DEGREES
EKG VENTRICULAR RATE: 124 BPM
EKG VENTRICULAR RATE: 82 BPM
EKG VENTRICULAR RATE: 91 BPM
ERYTHROCYTE [DISTWIDTH] IN BLOOD BY AUTOMATED COUNT: 13.6 % (ref 11.5–14.5)
ERYTHROCYTE [DISTWIDTH] IN BLOOD BY AUTOMATED COUNT: 45.9 FL (ref 35–45)
GLUCOSE BLD-MCNC: 217 MG/DL (ref 70–108)
GLUCOSE BLD-MCNC: 265 MG/DL (ref 70–108)
GLUCOSE BLD-MCNC: 324 MG/DL (ref 70–108)
GLUCOSE BLD-MCNC: 344 MG/DL (ref 70–108)
GLUCOSE URINE: >= 1000 MG/DL
HCT VFR BLD CALC: 36.5 % (ref 42–52)
HEMOGLOBIN: 11.9 GM/DL (ref 14–18)
KETONES, URINE: NEGATIVE
LEUKOCYTE ESTERASE, URINE: NEGATIVE
LV EF: 55 %
LVEF MODALITY: NORMAL
MCH RBC QN AUTO: 30.1 PG (ref 26–33)
MCHC RBC AUTO-ENTMCNC: 32.6 GM/DL (ref 32.2–35.5)
MCV RBC AUTO: 92.2 FL (ref 80–94)
NITRITE, URINE: NEGATIVE
PH UA: 5 (ref 5–9)
PLATELET # BLD: 308 THOU/MM3 (ref 130–400)
PMV BLD AUTO: 10.4 FL (ref 9.4–12.4)
PROTEIN UA: NEGATIVE
RBC # BLD: 3.96 MILL/MM3 (ref 4.7–6.1)
SPECIFIC GRAVITY, URINE: 1.02 (ref 1–1.03)
UROBILINOGEN, URINE: 0.2 EU/DL (ref 0–1)
WBC # BLD: 11.1 THOU/MM3 (ref 4.8–10.8)

## 2020-11-30 PROCEDURE — 87040 BLOOD CULTURE FOR BACTERIA: CPT

## 2020-11-30 PROCEDURE — 36415 COLL VENOUS BLD VENIPUNCTURE: CPT

## 2020-11-30 PROCEDURE — 2060000000 HC ICU INTERMEDIATE R&B

## 2020-11-30 PROCEDURE — 93010 ELECTROCARDIOGRAM REPORT: CPT | Performed by: INTERNAL MEDICINE

## 2020-11-30 PROCEDURE — 85027 COMPLETE CBC AUTOMATED: CPT

## 2020-11-30 PROCEDURE — 6360000002 HC RX W HCPCS: Performed by: INTERNAL MEDICINE

## 2020-11-30 PROCEDURE — 81003 URINALYSIS AUTO W/O SCOPE: CPT

## 2020-11-30 PROCEDURE — 94760 N-INVAS EAR/PLS OXIMETRY 1: CPT

## 2020-11-30 PROCEDURE — 2580000003 HC RX 258: Performed by: INTERNAL MEDICINE

## 2020-11-30 PROCEDURE — 6370000000 HC RX 637 (ALT 250 FOR IP): Performed by: INTERNAL MEDICINE

## 2020-11-30 PROCEDURE — 82948 REAGENT STRIP/BLOOD GLUCOSE: CPT

## 2020-11-30 PROCEDURE — 93005 ELECTROCARDIOGRAM TRACING: CPT | Performed by: INTERNAL MEDICINE

## 2020-11-30 PROCEDURE — 93306 TTE W/DOPPLER COMPLETE: CPT

## 2020-11-30 RX ADMIN — LANSOPRAZOLE 30 MG: 15 TABLET, ORALLY DISINTEGRATING, DELAYED RELEASE ORAL at 09:43

## 2020-11-30 RX ADMIN — DULOXETINE HYDROCHLORIDE 60 MG: 60 CAPSULE, DELAYED RELEASE ORAL at 19:57

## 2020-11-30 RX ADMIN — DONEPEZIL HYDROCHLORIDE 10 MG: 10 TABLET, FILM COATED ORAL at 19:57

## 2020-11-30 RX ADMIN — SODIUM CHLORIDE: 4.5 INJECTION, SOLUTION INTRAVENOUS at 06:45

## 2020-11-30 RX ADMIN — ROSUVASTATIN CALCIUM 10 MG: 10 TABLET, FILM COATED ORAL at 09:42

## 2020-11-30 RX ADMIN — ENOXAPARIN SODIUM 40 MG: 40 INJECTION SUBCUTANEOUS at 16:49

## 2020-11-30 RX ADMIN — CLOPIDOGREL BISULFATE 75 MG: 75 TABLET, FILM COATED ORAL at 09:45

## 2020-11-30 RX ADMIN — ALPRAZOLAM 0.5 MG: 0.5 TABLET ORAL at 23:52

## 2020-11-30 RX ADMIN — PIOGLITAZONE 15 MG: 15 TABLET ORAL at 09:43

## 2020-11-30 RX ADMIN — FENOFIBRATE 160 MG: 160 TABLET, FILM COATED ORAL at 09:45

## 2020-11-30 RX ADMIN — Medication 5000 UNITS: at 09:41

## 2020-11-30 RX ADMIN — FERROUS SULFATE TAB 325 MG (65 MG ELEMENTAL FE) 325 MG: 325 (65 FE) TAB at 09:44

## 2020-11-30 RX ADMIN — ACETAMINOPHEN 500 MG: 500 TABLET ORAL at 09:46

## 2020-11-30 RX ADMIN — LEVOTHYROXINE SODIUM 75 MCG: 75 TABLET ORAL at 06:58

## 2020-11-30 RX ADMIN — ASPIRIN 81 MG: 81 TABLET, CHEWABLE ORAL at 19:57

## 2020-11-30 RX ADMIN — SODIUM CHLORIDE: 4.5 INJECTION, SOLUTION INTRAVENOUS at 16:47

## 2020-11-30 ASSESSMENT — PAIN SCALES - GENERAL
PAINLEVEL_OUTOF10: 0

## 2020-11-30 NOTE — PROGRESS NOTES
[facility] [address]  OCCUPATIONAL THERAPY MISSED TREATMENT NOTE  [facility]-A      Date: 2020  Patient Name: Monica Lucero        CSN: [id number]   : 1940  (78 y.o.)  Gender: male                REASON FOR MISSED TREATMENT: Holding OT evaluation this AM. Per RN, pt's BP dropped into the 60s upon sitting EOB this AM. Will re-attempt as time allows and pt is able to tolerate.

## 2020-11-30 NOTE — PROGRESS NOTES
[facility]  PHYSICAL THERAPY MISSED TREATMENT NOTE  ACUTE CARE  [facility] NEUROSCIENCES 4A              Missed Treatment  Holding PT evaluation this AM. Per RN, pt's BP dropped into the 60s upon sitting EOB this AM. Will re-attempt as time allows and pt is able to tolerate.

## 2020-11-30 NOTE — H&P
[address] [place] [address]                              HISTORY AND PHYSICAL    PATIENT NAME: Mathew HAMMER                      :        1940  MED REC NO:   [id number]                           ROOM:       0011  ACCOUNT NO:   [de-identified]                           ADMIT DATE: 2020  PROVIDER:     Panchito Vincent M.D. REASON FOR EVALUATION:  Syncopal episode. HISTORY OF PRESENT ILLNESS:  This is an 78-year-old gentleman known to  me from prior encounters. He has a history of diffuse atherosclerotic  coronary artery disease and preserved systolic function of left  ventricle. The patient according to the wife has been feeling weak and  fatigued in the last two weeks or so. He went to the bathroom, and he  had a near-syncopal episode. The wife tried to get him up, and he lost  consciousness momentarily. Squad was called, and when the squad tried  to get him stand up, he had again near-syncopal episode. He was brought  to the emergency room where he was evaluated in the emergency room, and  he was found to have possible postural hypotension. The patient denies  chest pain per se. The patient when seen was agitated and very poor  historian. PAST MEDICAL HISTORY:  He has history of coronary artery disease, being  treated medically and stable. He does have history of thyroid disease. He has a history of dyslipidemia, gastroesophageal reflux. He does have  history of diabetes mellitus and history of depression, history of  arthritis, somewhat overweight. He has history of rotator cuff surgery  and he has history of retinopathy secondary to his diabetes. The  patient has history of chronic back pain and history of cholecystectomy. The patient has a history of back surgery and chronic back pain. REVIEW OF SYSTEMS:  He denies fevers, chills, or rigors. He had no  change in his weight.   No GI bleed or bleeding disorder. SOCIAL HISTORY:  He denied alcohol abuse. He is still smoking. ALLERGIES:  The patient has multiple allergies. CODE STATUS:  He is a full code. PHYSICAL EXAMINATION:  VITAL SIGNS:  Blood pressure was 112/57, decreased to 97/52 on standing. The patient was afebrile. He was in sinus tachycardia. Respiratory  rate was 15. The patient weighs 156 pounds. NEUROLOGIC:  He has no motor deficit but he was agitated. NECK:  He has no JVD. CARDIOVASCULAR:  He has 2/6 systolic murmur. ABDOMEN:  Protuberant due to obesity. EXTREMITIES:  Good femoral pulsation was inspected. No edema. His EKG shows sinus tachycardia. Nonspecific ST segment change. LABORATORY DATA:  Troponin was negative. His BUN 24, creatinine 1.3,  potassium 4.9. The procalcitonin is slightly elevated at 0.12. The white blood cell count was 11.1, _____. The patient did have a CAT  scan of the head which was negative. He had a chest x-ray which was  negative. The patient did have a COVID testing PCR and was told it was negative  too. IMPRESSION:  The patient with syncopal episode, possible postural  hypotension. The patient will be hydrated, given IV fluid. The Altace will be held and echocardiogram will be obtained. There is  slight elevation of his procalcitonin. We will monitor for any signs of  infection. History of coronary artery disease, diffuse. History of hypertension. History of diabetes mellitus. History of chronic renal insufficiency. History of depression. History of chronic back pain. Mild obesity. Pending his clinical course, further recommendation will be made.         Ferny Engle M.D.    D: 11/30/2020 4:50:23       T: 11/30/2020 6:37:43     [email or web address]  Job#: [id number]     Doc#: [id number]    CC:

## 2020-12-01 LAB
EKG ATRIAL RATE: 131 BPM
EKG Q-T INTERVAL: 384 MS
EKG QRS DURATION: 82 MS
EKG QTC CALCULATION (BAZETT): 579 MS
EKG R AXIS: -50 DEGREES
EKG T AXIS: 99 DEGREES
EKG VENTRICULAR RATE: 137 BPM
GLUCOSE BLD-MCNC: 187 MG/DL (ref 70–108)
GLUCOSE BLD-MCNC: 187 MG/DL (ref 70–108)
GLUCOSE BLD-MCNC: 254 MG/DL (ref 70–108)
GLUCOSE BLD-MCNC: 260 MG/DL (ref 70–108)

## 2020-12-01 PROCEDURE — 82948 REAGENT STRIP/BLOOD GLUCOSE: CPT

## 2020-12-01 PROCEDURE — 6360000002 HC RX W HCPCS: Performed by: INTERNAL MEDICINE

## 2020-12-01 PROCEDURE — 97530 THERAPEUTIC ACTIVITIES: CPT

## 2020-12-01 PROCEDURE — 97110 THERAPEUTIC EXERCISES: CPT

## 2020-12-01 PROCEDURE — 97162 PT EVAL MOD COMPLEX 30 MIN: CPT

## 2020-12-01 PROCEDURE — 6370000000 HC RX 637 (ALT 250 FOR IP)

## 2020-12-01 PROCEDURE — 6370000000 HC RX 637 (ALT 250 FOR IP): Performed by: INTERNAL MEDICINE

## 2020-12-01 PROCEDURE — 2580000003 HC RX 258: Performed by: INTERNAL MEDICINE

## 2020-12-01 PROCEDURE — 97166 OT EVAL MOD COMPLEX 45 MIN: CPT

## 2020-12-01 PROCEDURE — 93005 ELECTROCARDIOGRAM TRACING: CPT | Performed by: INTERNAL MEDICINE

## 2020-12-01 PROCEDURE — 93010 ELECTROCARDIOGRAM REPORT: CPT | Performed by: INTERNAL MEDICINE

## 2020-12-01 PROCEDURE — 2060000000 HC ICU INTERMEDIATE R&B

## 2020-12-01 RX ORDER — CLOPIDOGREL BISULFATE 75 MG/1
TABLET ORAL
Status: COMPLETED
Start: 2020-12-01 | End: 2020-12-01

## 2020-12-01 RX ORDER — ACETAMINOPHEN 500 MG
TABLET ORAL
Status: COMPLETED
Start: 2020-12-01 | End: 2020-12-01

## 2020-12-01 RX ORDER — MIDODRINE HYDROCHLORIDE 5 MG/1
5 TABLET ORAL
Status: DISCONTINUED | OUTPATIENT
Start: 2020-12-01 | End: 2020-12-02

## 2020-12-01 RX ORDER — ASPIRIN 81 MG/1
TABLET ORAL
Status: DISCONTINUED
Start: 2020-12-01 | End: 2020-12-01 | Stop reason: WASHOUT

## 2020-12-01 RX ORDER — ASPIRIN 81 MG/1
TABLET, CHEWABLE ORAL
Status: DISPENSED
Start: 2020-12-01 | End: 2020-12-02

## 2020-12-01 RX ADMIN — ROSUVASTATIN CALCIUM 10 MG: 10 TABLET, FILM COATED ORAL at 10:11

## 2020-12-01 RX ADMIN — LEVOTHYROXINE SODIUM 75 MCG: 75 TABLET ORAL at 06:48

## 2020-12-01 RX ADMIN — LANSOPRAZOLE 30 MG: 15 TABLET, ORALLY DISINTEGRATING, DELAYED RELEASE ORAL at 10:12

## 2020-12-01 RX ADMIN — MIDODRINE HYDROCHLORIDE 5 MG: 5 TABLET ORAL at 17:19

## 2020-12-01 RX ADMIN — DONEPEZIL HYDROCHLORIDE 10 MG: 10 TABLET, FILM COATED ORAL at 20:27

## 2020-12-01 RX ADMIN — METOPROLOL SUCCINATE 100 MG: 100 TABLET, FILM COATED, EXTENDED RELEASE ORAL at 16:19

## 2020-12-01 RX ADMIN — CLOPIDOGREL BISULFATE 75 MG: 75 TABLET, FILM COATED ORAL at 10:12

## 2020-12-01 RX ADMIN — DULOXETINE HYDROCHLORIDE 60 MG: 60 CAPSULE, DELAYED RELEASE ORAL at 20:27

## 2020-12-01 RX ADMIN — ENOXAPARIN SODIUM 40 MG: 40 INJECTION SUBCUTANEOUS at 14:37

## 2020-12-01 RX ADMIN — FENOFIBRATE 160 MG: 160 TABLET, FILM COATED ORAL at 10:11

## 2020-12-01 RX ADMIN — Medication 5000 UNITS: at 10:11

## 2020-12-01 RX ADMIN — SODIUM CHLORIDE: 4.5 INJECTION, SOLUTION INTRAVENOUS at 14:42

## 2020-12-01 RX ADMIN — ACETAMINOPHEN 500 MG: 500 TABLET ORAL at 10:12

## 2020-12-01 RX ADMIN — PIOGLITAZONE 15 MG: 15 TABLET ORAL at 10:11

## 2020-12-01 RX ADMIN — ASPIRIN 81 MG: 81 TABLET, CHEWABLE ORAL at 20:27

## 2020-12-01 RX ADMIN — CLOPIDOGREL BISULFATE 75 MG: 75 TABLET ORAL at 10:12

## 2020-12-01 RX ADMIN — FERROUS SULFATE TAB 325 MG (65 MG ELEMENTAL FE) 325 MG: 325 (65 FE) TAB at 10:11

## 2020-12-01 ASSESSMENT — PAIN SCALES - GENERAL: PAINLEVEL_OUTOF10: 0

## 2020-12-01 NOTE — PROGRESS NOTES
[address] [address]  INPATIENT PHYSICAL THERAPY  EVALUATION  [facility] 4A - 4A-11/011-A    Time In: 6  Time Out: 0572  Timed Code Treatment Minutes: 28 Minutes  Minutes: 44     Date: 2020  Patient Name: Patricia Sidhu,  Gender:  male        MRN: [id number]  : 1940  (78 y.o.)      Referring Practitioner: Elizabeth Kessler MD  Diagnosis: Syncope and collapse  Additional Pertinent Hx: \"Harsha Suggs is a 78 y.o. male who presents to the Emergency Department for the evaluation of syncopal episode. The patient has recent memory issues. Poor historian. The patient reports he does not remember what happened this morning. I contacted the patient's wife by phone. She states for the past several weeks he has been increased fatigue. He lays in bed most of the day. He gets up to eat and use the restroom but otherwise has been very sedentary. This morning he got out of bed and went to the bathroom. He missed the commode and was on the floor. He was having difficulty getting up. The wife tried to help him get up and she reports his eyes rolled to the back of the head and he went unresponsive for approximately 30 seconds. She contacted the EMS. She checked his blood sugar which was 328. The squad arrived and the patient was alert. They helped him sit in his roller walker. After sitting in the roller rocker he had another unresponsive episode this time for approximately 45 seconds. When the patient came to, he was a bit more confused and took a period of time for him to answer questions. She states the patient has not had any cough, no fever, no diarrhea illness, no complaints of abdominal pain. No known exposures to COVID-19. Patient has past medical history of increasing dementia, chronic back pain that has limited his mobility, he is insulin controlled diabetic but states his insulin demand has been decreased because of poor nutritional intake.   He also has history of coronary artery disease, thyroid disease, hyperlipidemia. \"     Restrictions/Precautions:  Restrictions/Precautions: Fall Risk    Subjective:  Chart Reviewed: Yes  Patient assessed for rehabilitation services?: Yes  Family / Caregiver Present: No  Comments: Monitor BP and symptoms throughout session. Subjective: RN approved therapy session. Pt supine in bed upon entry and agreeable to PT intervention. Pt displays fatigue and is disoriented to time. BP and symptoms were monitored throughout session. Post session pt in bedside chair with all needs in reach. Chair alarm on. General:  Overall Orientation Status: Impaired  Orientation Level: Disoriented to time  Follows Commands: Within Functional Limits    Vision: Impaired  Vision Exceptions: Wears glasses for reading    Pain: pt denies pain    Social/Functional History:    Lives With: Spouse  Type of Home: House  Home Layout: One level  Home Access: Stairs to enter with rails  Entrance Stairs - Number of Steps: at least 1  Entrance Stairs - Rails: Right  Home Equipment: Rolling walker     Ambulation Assistance: Independent  Transfer Assistance: Independent    Active : Yes    OBJECTIVE:  Range of Motion:  Bilateral Lower Extremity: WFL    Strength:  Bilateral Lower Extremity: WFL    Balance:  Static Sitting Balance:  Contact Guard Assistance, Minimal Assistance, with cues for safety, with verbal cues , pt reported dizziness sitting EOB and displayed pale skin and minimal swaying with eyes closed   Static Standing Balance: Contact Guard Assistance, Minimal Assistance, with cues for safety, with verbal cues , with increased time for completion    Bed Mobility:  Rolling to Left: Stand By Assistance, with head of bed raised, with increased time for completion   Supine to Sit: Minimal Assistance, with head of bed raised, with verbal cues , with increased time for completion  Pt utilized bed rail to help with bed mobility - instructed to only use as needed.   Supine BP: 100/58  Sitting BP: 98/58 with reported dizziness and pale face - pt laid back down and symptoms decreased   2nd attempt sitting EOB BP: 93/52 with no symptoms of dizziness    Transfers:  Sit to Stand: Minimal Assistance, with increased time for completion, cues for hand placement, to/from chair with arms  Stand to Sit:Contact Guard Assistance, Minimal Assistance, with increased time for completion, cues for hand placement, to/from chair with arms   Pt required cueing for proper hand placement on stable surface. Ambulation:  Minimal Assistance, X 2, with cues for safety, with increased time for completion  Distance: 3 ft to bedside chair  Surface: Level Tile  Device:Rolling Walker  Gait Deviations: Forward Flexed Posture, Decreased Gait Speed and Unsteady Gait  Pt required assistance with 2WW management. Exercise:  Patient was guided in 1 set(s) 10 reps of exercise to both lower extremities. Ankle pumps, Glut sets, Quad sets, Heelslides and Hip abduction/adduction. Exercises were completed for increased independence with functional mobility. Pt required cueing to increase ROM and activate correct muscle    Functional Outcome Measures: Completed  -PAC Inpatient Mobility without Stair Climbing Raw Score : 14  -PAC Inpatient without Stair Climbing T-Scale Score : 40.85    ASSESSMENT:  Activity Tolerance:  Patient tolerance of  treatment: fair. Pt limited by fatigue, decreased BP and dizziness      Treatment Initiated: Treatment and education initiated within context of evaluation. Evaluation time included review of current medical information, gathering information related to past medical, social and functional history, completion of standardized testing, formal and informal observation of tasks, assessment of data and development of plan of care and goals.   Treatment time included skilled education and facilitation of tasks to increase safety and independence with functional mobility for improved

## 2020-12-01 NOTE — PROGRESS NOTES
Mendy Ca 58  INPATIENT OCCUPATIONAL THERAPY  [facility] NEUROSCIENCES 4A  EVALUATION    Time:    Time In: 1500  Time Out: 1523  Timed Code Treatment Minutes: 8 Minutes  Minutes: 23          Date: 2020  Patient Name: Samir Bush,   Gender: male      MRN: [id number]  : 1940  (78 y.o.)  Referring Practitioner: Dr. Sri Ramirez  Diagnosis: syncope & collapse  Additional Pertinent Hx: Per ER note on 2020:  78 y.o. male who presents to the Emergency Department for the evaluation of syncopal episode. The patient has recent memory issues. Poor historian. The patient reports he does not remember what happened this morning. I contacted the patient's wife by phone. She states for the past several weeks he has been increased fatigue. He lays in bed most of the day. He gets up to eat and use the restroom but otherwise has been very sedentary. Restrictions/Precautions:  Restrictions/Precautions: Fall Risk  Position Activity Restriction  Other position/activity restrictions: + ortho BP: abdominal binder + TEDs prior to EOB    Subjective  Chart Reviewed: Yes, Orders, Progress Notes, History and Physical  Patient assessed for rehabilitation services?: Yes  Family / Caregiver Present: Yes(wife present)    Subjective: drowsy    Pain:  Pain Assessment  Patient Currently in Pain: No    Social/Functional History:  Lives With: Spouse  Type of Home: House  Home Layout: One level  Home Access: Stairs to enter with rails  Entrance Stairs - Number of Steps: 2  Entrance Stairs - Rails: Right  Home Equipment: Rolling walker   Bathroom Shower/Tub: Walk-in shower  Bathroom Toilet: Handicap height  Bathroom Equipment: Shower chair, Grab bars in shower, Grab bars around toilet       ADL Assistance: Needs assistance(wife A with BADL in shower. s/u for dressing)  Homemaking Assistance: Needs assistance  Ambulation Assistance: Independent  Transfer Assistance: Independent    Active :  Yes     Additional Comments: Wife reports Pt was indep with RW PLOF. Pt's chronic back problems limited mobility distance. Cognition/Orientation:     Overall Cognitive Status: Exceptions(drowsy, confused, disoriented to place)    ADL's:  LE Dressing: Dependent/Total       Functional Mobility:  Bed mobility  Rolling to Left: Minimal assistance  Rolling to Right: Minimal assistance  Supine to Sit: Moderate assistance  Sit to Supine: Moderate assistance          Balance:   sitting: CGA-min A  Standing: mod A x 2 for safety/blocking of B knees    Transfers:  Sit to stand: Moderate assistance(x 2 with blocking of B knees)  Transfer Comments: nurse requesting A for obtain ortho BP: supine 109/64, sitting 97/52, standing 80/50-Pt noted to be sweaty/pale/drowsy throughout session-nurse aware; at end of session tech present for stat EKG       Upper Extremity Assessment:   LUE AROM : WFL  RUE AROM : WFL    LUE Strength  Gross LUE Strength: (appears 4-/5)  RUE Strength  Gross RUE Strength: (appears 4-/5)    Sensation  Overall Sensation Status: WFL(BUE appears WFL)       Activity Tolerance: Patient limited by fatigue  low BP-drowsiness    Assessment:  Assessment: Pt demo decreased balance & endurance for ADLs at [facility]. Continued OT recommended to faciliate improved safety & indep with ADLs for eventual return home. Performance deficits / Impairments: Decreased functional mobility , Decreased ADL status, Decreased endurance, Decreased safe awareness, Decreased balance, Decreased cognition  Prognosis: Fair  REQUIRES OT FOLLOW UP: Yes  Decision Making: Medium Complexity  Safety Devices in place: Yes  Type of devices: All fall risk precautions in place, Call light within reach, Gait belt, Bed alarm in place, Nurse notified    Treatment Initiated: Treatment and education initiated within context of evaluation.   Evaluation time included review of current medical information, gathering information related to past medical, social and functional history, completion of standardized testing, formal and informal observation of tasks, assessment of data and development of plan of care and goals. Treatment time included skilled education and facilitation of tasks to increase safety and independence with ADL's for improved functional independence and quality of life. Discharge Recommendations:  Continue to assess pending progress    Patient Education:  OT Education: OT Role, Plan of Care, ADL Adaptive Strategies, Transfer Training  Barriers to Learning: decreased cognition    Equipment Recommendations: Other: Monitor pending progress    Plan:  Times per week: 5x  Current Treatment Recommendations: Balance Training, Functional Mobility Training, Endurance Training, Safety Education & Training, Self-Care / ADL    Goals:  Patient goals : Pt did not state  Short term goals  Time Frame for Short term goals: until discharge  Short term goal 1: Tolerate sitting EOB 5-6 min with S for increased endurance for EOB ADLs  Short term goal 2: Tolerate standing 1 min with min A x 1 for increased ease of toileting routine  Short term goal 3: Tolerate further assessment of functional mobility by OTR when appropriate  Long term goals  Time Frame for Long term goals : No LTG set d/t short ELOS  See long-term goal time frame for expected duration of plan of care. If no long-term goals established, a short length of stay is anticipated. Following session, patient left in safe position with all fall risk precautions in place.

## 2020-12-01 NOTE — CARE COORDINATION
DISCHARGE/PLANNING EVALUATION  12/1/20, 3:02 PM EST    Reason for Referral: Possible ECF   Mental Status: Answered some questions, spouse did most of the talking. Decision Making: Spouse helps with the decision maing. Family/Social/Home Environment: Lives at home with his spouse. She is in good health and able to help as needed. They have a daughter Isidro Blas in the area and a son Kip Adam in [place]. They also has nieces and nephews in the area that can help if needed. Current Services including food security, transportation and housekeeping: No current services. Spouse makes sure all of his needs are met. Current Equipment:rolling walker, wheelchair, high toilet, shower bench, grab bars at the entrance and in the bathroom. Payment Source:[employer]  Concerns or Barriers to Discharge: None  Post acute provider list with quality measures, geographic area and applicable managed care information provided. Questions regarding selection process answered:Home health list given. Teach Back Method used with Elpidio Joseph and his spouse regarding care plan and discharge planning. Patient and spouse verbalize understanding of the plan of care and contribute to goal setting. Patient goals, treatment preferences and discharge plan: Elpidio Joseph would like to return home with spouse. She is agreeable to take him home. The are agreeable to home health at discharge. Gave them a list, will stop back for their home health preference. Electronically signed by ANA MARIA Ching on 12/1/2020 at 3:02 PM

## 2020-12-01 NOTE — PROGRESS NOTES
Admit Date: 11/29/2020  Hospital day 2    Subjective:     Patient dizzy and sob . Medication side effects: none    Scheduled Meds:   midodrine  5 mg Oral TID WC    DULoxetine  60 mg Oral Nightly    donepezil  10 mg Oral Nightly    ferrous sulfate  325 mg Oral Daily with breakfast    Vitamin D  5,000 Units Oral Daily    metoprolol succinate  100 mg Oral Daily    rosuvastatin  10 mg Oral Daily    levothyroxine  75 mcg Oral Daily    fenofibrate  160 mg Oral Daily    pioglitazone  15 mg Oral Daily    aspirin  81 mg Oral Nightly    clopidogrel  75 mg Oral Daily    sodium chloride flush  10 mL Intravenous 2 times per day    enoxaparin  40 mg Subcutaneous Q24H    insulin NPH  8 Units Subcutaneous QPM    insulin regular  5 Units Subcutaneous BID AC    insulin NPH  7 Units Subcutaneous QAM    acetaminophen  500 mg Oral Daily    lansoprazole  30 mg Oral Daily     Continuous Infusions:   dextrose      sodium chloride 100 mL/hr at 12/01/20 1442     PRN Meds:loperamide, traMADol, tiZANidine, sodium chloride flush, acetaminophen **OR** acetaminophen, polyethylene glycol, promethazine **OR** ondansetron, glucose, glucagon (rDNA), dextrose, dextrose, ALPRAZolam    Review of Systems  Pertinent items are noted in HPI. Objective:     Patient Vitals for the past 8 hrs:   BP Temp Temp src Pulse Resp SpO2   12/01/20 1528 -- 97.6 °F (36.4 °C) Oral -- -- --   12/01/20 1006 (!) 104/57 98.1 °F (36.7 °C) Oral 105 16 95 %     I/O last 3 completed shifts: In: 3227.2 [P.O.:1000;  I.V.:2227.2]  Out: 500 [Urine:500]    nc    CBC:  Lab Results   Component Value Date    WBC 11.1 11/30/2020    RBC 3.96 11/30/2020    RBC 0-2 10/01/2011    HGB 11.9 11/30/2020    HCT 36.5 11/30/2020    MCV 92.2 11/30/2020    MCH 30.1 11/30/2020    MCHC 32.6 11/30/2020    RDW 14.1 08/18/2020     11/30/2020    MPV 10.4 11/30/2020     BMP  Lab Results   Component Value Date     11/29/2020    K 4.9 11/29/2020    CL 94 11/29/2020    CO2 21 11/29/2020    BUN 24 11/29/2020    CREATININE 1.3 11/29/2020    CALCIUM 10.4 11/29/2020      COAG PROFILE:  Lab Results   Component Value Date    APTT 29.6 11/30/2018    INR 0.87 11/30/2018       Assessment:     Active Problems:    Syncope and collapse  Resolved Problems:    * No resolved hospital problems.  *      Plan:     patient still drowsy and weak    still having postural hypotension    will place on proamatine    continue iv fluids will get neuroconsult for change in mental status

## 2020-12-01 NOTE — FLOWSHEET NOTE
12/01/20 1519   Vitals   Orthostatic B/P and Pulse? Yes   Blood Pressure Lying 109/64   Pulse Lying 132 PER MINUTE   Blood Pressure Sitting 97/52   Pulse Sitting 141 PER MINUTE   Blood Pressure Standing 80/50   Pulse Standing 142 PER MINUTE     Abdominal binder and bilateral knee high compression stocking applied at this time. EKG being completed for tachycardia. Dr. Lona Barnett notified of Afib RVR, positive orthostatic vitals.

## 2020-12-02 ENCOUNTER — APPOINTMENT (OUTPATIENT)
Dept: INTERVENTIONAL RADIOLOGY/VASCULAR | Age: 80
DRG: 312 | End: 2020-12-02
Payer: MEDICARE

## 2020-12-02 LAB
GLUCOSE BLD-MCNC: 183 MG/DL (ref 70–108)
GLUCOSE BLD-MCNC: 190 MG/DL (ref 70–108)
GLUCOSE BLD-MCNC: 256 MG/DL (ref 70–108)
GLUCOSE BLD-MCNC: 270 MG/DL (ref 70–108)

## 2020-12-02 PROCEDURE — 2060000000 HC ICU INTERMEDIATE R&B

## 2020-12-02 PROCEDURE — 95819 EEG AWAKE AND ASLEEP: CPT

## 2020-12-02 PROCEDURE — 82948 REAGENT STRIP/BLOOD GLUCOSE: CPT

## 2020-12-02 PROCEDURE — 97530 THERAPEUTIC ACTIVITIES: CPT

## 2020-12-02 PROCEDURE — 99223 1ST HOSP IP/OBS HIGH 75: CPT | Performed by: PSYCHIATRY & NEUROLOGY

## 2020-12-02 PROCEDURE — 6370000000 HC RX 637 (ALT 250 FOR IP): Performed by: INTERNAL MEDICINE

## 2020-12-02 PROCEDURE — 6360000002 HC RX W HCPCS: Performed by: INTERNAL MEDICINE

## 2020-12-02 PROCEDURE — 93880 EXTRACRANIAL BILAT STUDY: CPT

## 2020-12-02 PROCEDURE — 2580000003 HC RX 258: Performed by: INTERNAL MEDICINE

## 2020-12-02 PROCEDURE — 97110 THERAPEUTIC EXERCISES: CPT

## 2020-12-02 PROCEDURE — 95816 EEG AWAKE AND DROWSY: CPT | Performed by: PSYCHIATRY & NEUROLOGY

## 2020-12-02 RX ORDER — MIDODRINE HYDROCHLORIDE 10 MG/1
10 TABLET ORAL
Status: DISCONTINUED | OUTPATIENT
Start: 2020-12-02 | End: 2020-12-04 | Stop reason: HOSPADM

## 2020-12-02 RX ORDER — FLUTICASONE PROPIONATE 50 MCG
1 SPRAY, SUSPENSION (ML) NASAL DAILY
Status: DISCONTINUED | OUTPATIENT
Start: 2020-12-02 | End: 2020-12-04 | Stop reason: HOSPADM

## 2020-12-02 RX ADMIN — DULOXETINE HYDROCHLORIDE 60 MG: 60 CAPSULE, DELAYED RELEASE ORAL at 20:29

## 2020-12-02 RX ADMIN — LEVOTHYROXINE SODIUM 75 MCG: 75 TABLET ORAL at 08:53

## 2020-12-02 RX ADMIN — CLOPIDOGREL BISULFATE 75 MG: 75 TABLET, FILM COATED ORAL at 08:51

## 2020-12-02 RX ADMIN — ALPRAZOLAM 0.5 MG: 0.5 TABLET ORAL at 23:46

## 2020-12-02 RX ADMIN — SODIUM CHLORIDE: 4.5 INJECTION, SOLUTION INTRAVENOUS at 00:30

## 2020-12-02 RX ADMIN — TIZANIDINE 2 MG: 4 TABLET ORAL at 00:29

## 2020-12-02 RX ADMIN — ACETAMINOPHEN 650 MG: 325 TABLET ORAL at 15:15

## 2020-12-02 RX ADMIN — MIDODRINE HYDROCHLORIDE 5 MG: 5 TABLET ORAL at 08:57

## 2020-12-02 RX ADMIN — ROSUVASTATIN CALCIUM 10 MG: 10 TABLET, FILM COATED ORAL at 08:55

## 2020-12-02 RX ADMIN — FERROUS SULFATE TAB 325 MG (65 MG ELEMENTAL FE) 325 MG: 325 (65 FE) TAB at 08:52

## 2020-12-02 RX ADMIN — FLUTICASONE PROPIONATE 1 SPRAY: 50 SPRAY, METERED NASAL at 17:03

## 2020-12-02 RX ADMIN — FENOFIBRATE 160 MG: 160 TABLET, FILM COATED ORAL at 08:52

## 2020-12-02 RX ADMIN — SODIUM CHLORIDE: 4.5 INJECTION, SOLUTION INTRAVENOUS at 11:11

## 2020-12-02 RX ADMIN — ACETAMINOPHEN 500 MG: 500 TABLET ORAL at 08:50

## 2020-12-02 RX ADMIN — MIDODRINE HYDROCHLORIDE 10 MG: 10 TABLET ORAL at 17:03

## 2020-12-02 RX ADMIN — ASPIRIN 81 MG: 81 TABLET, CHEWABLE ORAL at 20:31

## 2020-12-02 RX ADMIN — LANSOPRAZOLE 30 MG: 15 TABLET, ORALLY DISINTEGRATING, DELAYED RELEASE ORAL at 08:53

## 2020-12-02 RX ADMIN — MIDODRINE HYDROCHLORIDE 5 MG: 5 TABLET ORAL at 12:14

## 2020-12-02 RX ADMIN — PIOGLITAZONE 15 MG: 15 TABLET ORAL at 08:55

## 2020-12-02 RX ADMIN — ALPRAZOLAM 0.5 MG: 0.5 TABLET ORAL at 00:29

## 2020-12-02 RX ADMIN — ENOXAPARIN SODIUM 40 MG: 40 INJECTION SUBCUTANEOUS at 14:55

## 2020-12-02 RX ADMIN — TIZANIDINE 2 MG: 4 TABLET ORAL at 23:50

## 2020-12-02 RX ADMIN — Medication 5000 UNITS: at 08:56

## 2020-12-02 ASSESSMENT — PAIN SCALES - GENERAL
PAINLEVEL_OUTOF10: 1
PAINLEVEL_OUTOF10: 0
PAINLEVEL_OUTOF10: 0
PAINLEVEL_OUTOF10: 10
PAINLEVEL_OUTOF10: 0

## 2020-12-02 ASSESSMENT — PAIN DESCRIPTION - PAIN TYPE: TYPE: ACUTE PAIN

## 2020-12-02 ASSESSMENT — PAIN DESCRIPTION - ORIENTATION: ORIENTATION: INNER

## 2020-12-02 ASSESSMENT — PAIN DESCRIPTION - ONSET: ONSET: ON-GOING

## 2020-12-02 ASSESSMENT — PAIN DESCRIPTION - DESCRIPTORS: DESCRIPTORS: ACHING;SHARP

## 2020-12-02 ASSESSMENT — PAIN DESCRIPTION - LOCATION: LOCATION: NOSE

## 2020-12-02 ASSESSMENT — PAIN DESCRIPTION - PROGRESSION: CLINICAL_PROGRESSION: GRADUALLY WORSENING

## 2020-12-02 ASSESSMENT — PAIN DESCRIPTION - FREQUENCY: FREQUENCY: INTERMITTENT

## 2020-12-02 ASSESSMENT — PAIN - FUNCTIONAL ASSESSMENT: PAIN_FUNCTIONAL_ASSESSMENT: ACTIVITIES ARE NOT PREVENTED

## 2020-12-02 NOTE — FLOWSHEET NOTE
12/02/20 1245   Provider Notification   Reason for Communication Evaluate   Provider Name Dr. Raya Patino   Provider Notification Physician   Method of Communication Secure Message   Response Waiting for response   Notification Time [phone] [id number]     Neurology consult completed to Dr. Raya Patino.

## 2020-12-02 NOTE — CONSULTS
NEUROLOGY CONSULT NOTE      Requesting Physician: Vicki Caldwell MD    Reason for Consult:  Evaluate for syncope, dizziness. History of Present Illness:  Emmie Hodgkins is a 78 y.o. male admitted to [facility] on 11/29/2020. He presents to the Emergency Department for the evaluation of syncopal episode. He is a Poor historian. The patient's wife reports for the past several weeks he has been increased fatigue. He lays in bed most of the day. He gets up to eat and use the restroom but otherwise has been very sedentary. On the morning of 11/29/2020 he got out of bed and went to the bathroom. He missed the commode and was on the floor. He was having difficulty getting up. The wife tried to help him get up and she reports his eyes rolled to the back of the head and he went unresponsive for approximately 30 seconds. She contacted the EMS. She checked his blood sugar which was 328. The squad arrived and the patient was alert. They helped him sit in his roller walker. After sitting in the roller rocker he had another unresponsive episode this time for approximately 45 seconds. When the patient came to, he was a bit more confused and took a period of time for him to answer questions. Patient has past medical history of dementia, chronic back pain that has limited his mobility, he is insulin controlled diabetic but states his insulin demand has been decreased because of poor nutritional intake. He also has history of coronary artery disease, thyroid disease, hyperlipidemia. GAURAV mitchell has been diabetic for at least the past 45 years. Reports no chest pain. No shortness of breath with exertion. Reports no neck pain. No vision changes. No dysphagia. No fever. No rash. No weight loss.   History provided by patient accompanied by his wife at bedside    Past Medical History:        Diagnosis Date    Arthritis     CAD (coronary artery disease)     Depression     Diabetes mellitus ([address]     GERD (gastroesophageal reflux disease)     Hyperlipidemia     Thyroid disease            Procedure Laterality Date    BACK SURGERY  04/1991    lumbar laminectomy    CARDIAC CATHETERIZATION  12/2005    CARDIAC CATHETERIZATION  07/11/2018    CARDIOVASCULAR STRESS TEST  08/2015    CARPAL TUNNEL RELEASE Right 1970    CHOLECYSTECTOMY      COLONOSCOPY      2004, 2007, 2012,  2017    DEBRIDEMENT Left 12/2003    Ear    EYE SURGERY  2005    bilateral cataracts    IMPLANTATION VAGAL NERVE STIMULATOR N/A 12/7/2018    THORACIC LAMINECTOM PERMANENT SPINAL CORD STIMULATOR PADDLE AND BATTERY PLACEMENT performed by Chelsi Hernandez MD at [address] [facility] [address] BILATERAL Bilateral 11/27/2017    LUMBAR FACET MBB   L3-4, L4-5, L5-S1 BILATERAL performed by Luisana Gurrola MD at [place] [address] Bilateral 02/06/2018    LUMBAR FACET MBB L3-4, L4-5, L5-S1    OTHER SURGICAL HISTORY Right 03/05/2018    Lumbar RFA at L3-4, L4-5, L5-S1    OTHER SURGICAL HISTORY Left 03/27/2018    Lumbar RFA at L3-4, L4-5, L5-S1    OH INJ DX/THER AGNT PARAVERT FACET JOINT, LUMBAR/SAC, 1ST LEVEL Bilateral 2/6/2018    LUMBAR FACET MBB #2  L3-4, L4-5, L5-S1 BILATERAL performed by Luisana Gurrola MD at [place] [address] Right 3/5/2018    LUMBAR RFA RIGHT SIDE FIRST @ L3-4,4-5,5-S1 performed by Luisana Gurrola MD at [place] [address] Left 3/27/2018    LUMBAR RFA LEFT SIDE FIRST @L2-3, L3-4,4-5,5-S1 performed by Luisana Gurrola MD at [address] [place] [address] DX/THER SBST INTRLMNR LMBR/SAC W/IMG GDN N/A 7/19/2018    [email or web address] performed by Luisana Gurrola MD at [address] OFFICE/OUTPT VISIT,PROCEDURE ONLY N/A 10/12/2018    RIGHT SPINAL CORD STIMULATOR IMPLANT TRIAL  ENTRY L1  BASE @T6 performed by Luisana Gurrola MD at [address] [place] [address] Bilateral 1997    ROTATOR CUFF REPAIR Left 1998    TOOTH EXTRACTION  1970    UPPER GASTROINTESTINAL ENDOSCOPY  2012       Allergies:     Allergies   Allergen Reactions    Nsaids Other (See Comments)     GI bleed with large doses; does take small doses regularly         Current Medications:   midodrine (PROAMATINE) tablet 10 mg, TID WC  fluticasone (FLONASE) 50 MCG/ACT nasal spray 1 spray, Daily  DULoxetine (CYMBALTA) extended release capsule 60 mg, Nightly  loperamide (IMODIUM) capsule 2 mg, 4x Daily PRN  ferrous sulfate (IRON 325) tablet 325 mg, Daily with breakfast  Vitamin D (CHOLECALCIFEROL) tablet 5,000 Units, Daily  metoprolol succinate (TOPROL XL) extended release tablet 100 mg, Daily  rosuvastatin (CRESTOR) tablet 10 mg, Daily  levothyroxine (SYNTHROID) tablet 75 mcg, Daily  fenofibrate (TRIGLIDE) tablet 160 mg, Daily  pioglitazone (ACTOS) tablet 15 mg, Daily  aspirin chewable tablet 81 mg, Nightly  traMADol (ULTRAM) tablet 50 mg, Q8H PRN  tiZANidine (ZANAFLEX) tablet 2 mg, Q8H PRN  clopidogrel (PLAVIX) tablet 75 mg, Daily  sodium chloride flush 0.9 % injection 10 mL, 2 times per day  sodium chloride flush 0.9 % injection 10 mL, PRN  acetaminophen (TYLENOL) tablet 650 mg, Q6H PRN    Or  acetaminophen (TYLENOL) suppository 650 mg, Q6H PRN  polyethylene glycol (GLYCOLAX) packet 17 g, Daily PRN  promethazine (PHENERGAN) tablet 12.5 mg, Q6H PRN    Or  ondansetron (ZOFRAN) injection 4 mg, Q6H PRN  enoxaparin (LOVENOX) injection 40 mg, Q24H  glucose (GLUTOSE) 40 % oral gel 15 g, PRN  glucagon (rDNA) injection 1 mg, PRN  dextrose 5 % solution, PRN  dextrose 50 % IV solution, PRN  insulin NPH (HUMULIN N;NOVOLIN N) injection vial 8 Units, QPM  insulin regular (HUMULIN R;NOVOLIN R) injection 5 Units, BID AC  insulin NPH (HUMULIN N;NOVOLIN N) injection vial 7 Units, QAM  acetaminophen (TYLENOL) tablet 500 mg, Daily  lansoprazole (PREVACID SOLUTAB) disintegrating tablet 30 mg, Daily  0.45 % sodium chloride infusion, Continuous  ALPRAZolam (XANAX) tablet 0.5 mg, Q6H PRN         Social History:  Social History     Tobacco Use   Smoking Status Current Every Day Smoker    Types: Pipe, Cigars   Smokeless Tobacco Never Used     Social History     Substance and Sexual Activity   Alcohol Use No     Social History     Substance and Sexual Activity   Drug Use No         Family History:       Problem Relation Age of Onset    High Blood Pressure Mother     Stroke Mother     Cancer Father     Colon Cancer Father     Diabetes Father     Breast Cancer Brother         esophageal    Diabetes Brother     Heart Disease Brother     Diabetes Paternal Grandmother     Heart Disease Brother     Heart Disease Brother        Review of Systems:  All systems reviewed and are all negative except what is mentioned in history of present illness. I reviewed the patient PMH,medications, family history, social history. Physical Exam:  BP (!) 103/52   Pulse 88   Temp 98.1 °F (36.7 °C) (Oral)   Resp 16   Ht 5' 6\" (1.676 m)   Wt 165 lb 11.2 oz (75.2 kg)   SpO2 93%   BMI 26.74 kg/m²  I Body mass index is 26.74 kg/m². I   Wt Readings from Last 1 Encounters:   12/02/20 165 lb 11.2 oz (75.2 kg)           General appearance - alert, in no distress, oriented to  person, place, over weight, he is lying in bed, his wife is at bedside. He follows commands. He quickly drifts asleep during my exam, but is arousable and awakes quickly when stimulated  Mental status -Level of Alertness: awake  Orientation: person, place, time  Memory: [employer] of Knowledge: Fair  Attention/Concentration: poor  Language: normal. Mood is normal  Neck - supple, no neck adenopathy, carotids upstroke normal bilaterally, no carotid bruits. There is no LAP in the neck. There is no thyroid enlargement.       Neurological -   Cranial [id number]:   Cranial nerve II: Normal. There is full visual fields  Cranial nerve III: Pupils: equal, round, reactive to light Cranial nerves III, IV, VI: Extraocular Movements: intact   Cranial nerve V: Facial sensation: intact   Cranial nerve VII:Facial strength: There is mild left facial asymmetry  Cranial nerve VIII: Hearing: intact   Cranial nerve IX: Palate Elevation intact bilaterally  Cranial nerve XI: Shoulder shrug intact bilaterally  Cranial nerve XII: Tongue midline   neck supple without rigidity  DTR's are decreased distal and symmetric  Babinski sign is negative on bilaterally. Motor exam is 5/5 in the upper and lower extremities. Normal muscle tone. There is no muscle atrophy. There is no muscle fasciculation    Sensory is intact forlight touch. Coordination: finger to nose intact  Gait and station not tested  Abnormal movement none. Long tracts are  deferred. Skin - no rashes or lesions, warm and dry to touch  Superficial temporal artery pulses are normal.   Musculoskeletal: Has no hand arthritis, no limitation of ROM in any of the four extremities. no joint tenderness, deformity or swelling. There is no leg edema. The Heart was regular in rate and rhythm. No heart murmur  Chest- Clear, good effort. Abdomen: soft, intact bowel sounds. Labs:    CBC:   Recent Labs     11/30/20  0340   WBC 11.1*   HGB 11.9*      MCV 92.2   MCH 30.1   MCHC 32.6       Results for orders placed during the hospital encounter of 11/29/20   CT HEAD WO CONTRAST    Narrative EXAM:  CT HEAD:    COMPARISON:  None    TECHNIQUE:  Helical noncontrast axial images from base of skull to vertex,   with sagittal and coronal reformats. FINDINGS:    The sulci are moderately enlarged due to involutional changes, with   particular symmetric atrophic changes of frontal and temporal lobes. The   ventricles are unremarkable. No mass, mass-effect, hemorrhage or evidence of major vessel infarct is   identified. The posterior fossa contents are unremarkable. Atherosclerotic calcifications of vessels at base of brain noted.   No abnormality of skull base or calvarium is detected. Orbital soft   tissues are unremarkable. No significant abnormality of paranasal sinuses is appreciated. Mastoid air cells are clear. Impression Generalized involutional changes noted, particularly prominent   frontotemporal atrophy. No acute abnormality identified. This document has been electronically signed by: Kavin Bhatt MD   on 11/29/2020 07:07 AM    All CT scans at this facility use dose modulation, iterative   reconstruction, and/or weight-based  dosing when appropriate to reduce radiation dose to as low as reasonably   achievable. 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 *  This is an 78-year-old male who presents with syncope and collapse. He is known to have history of orthostatic hypotension. He is being evaluated by cardiology and was started on midodrine. Neurology was consulted due to change in mentation. CT head done without contrast on 11/29/2020 showed no acute findings. He is unable to have MRI brain due to spinal cord stimulator in place. He has been diabetic since his 35s. I suspect he has an element of diabetic autonomic neuropathy which is contributing to his orthostatic hypotension and episodic change in mental status. At the time of my evaluation, he is laying in bed, he follows commands, he has difficulty staying awake, but does arouse when engaged. We will arrange for him to undergo an EEG to screen for seizure tendency as well as carotid ultrasound to evaluate for carotid artery stenosis. After detailed discussion with patient and his wife we agreed on the following plan. Recommendations:                                              1. CT head without contrast reviewed. 2. He is unable to have MRI brain due to spinal cord stimulator  3. EEG  4. Carotid ultrasound  5. Cardiology following for orthostatic hypotension  6. Correct metabolic derangements  7. Physical therapy evaluated for gait safety. 8. B12, Folate. 9. DVT prophylaxis  10. Discussed with primary service. 11. Please call if any questions.             Electronically signed by Fred Packer MD on 12/2/2020 at 4:21 PM

## 2020-12-02 NOTE — PROGRESS NOTES
disease, thyroid disease, hyperlipidemia. \"     Prior Level of Function:  Lives With: Spouse  Type of Home: House  Home Layout: One level  Home Access: Stairs to enter with rails  Entrance Stairs - Number of Steps: 2  Entrance Stairs - Rails: Right  Home Equipment: Rolling walker   Bathroom Shower/Tub: Walk-in shower  Bathroom Toilet: Handicap height  Bathroom Equipment: Shower chair, Grab bars in shower, Grab bars around toilet    ADL Assistance: Needs assistance(wife A with BADL in shower. s/u for dressing)  Homemaking Assistance: Needs assistance  Ambulation Assistance: Independent  Transfer Assistance: Independent  Active : Yes  Additional Comments: Wife reports Pt was indep with RW PLOF. Pt's chronic back problems limited mobility distance. Restrictions/Precautions:  Restrictions/Precautions: Fall Risk  Position Activity Restriction  Other position/activity restrictions: + ortho BP: abdominal binder + TEDs prior to EOB     SUBJECTIVE: RN approved session, reports to keep an eye on HR, due to patient being tachy yesterday when working with therapy. Patient in bed upon arrival, agreed to therapy with min encouragement but cooperative throughout. Wife coming in at start of session, very supportive. Patient requesting to use BSC when sitting EOB, therapist not feeling safe with transfer to [facility] d/t very low BP, had patient lay back down and placed bed pan under patient, was able to have BM. PAIN: No pain noted.      OBJECTIVE:  Bed Mobility:  Rolling to Left: Stand By Assistance, with verbal cues , with increased time for completion   Rolling to Right: Stand By Assistance, with verbal cues , with increased time for completion   Supine to Sit: Minimal Assistance, with verbal cues , with increased time for completion, HOB raised  Sit to Supine: Minimal Assistance, with verbal cues , with increased time for completion   Supine BP: After sittin/66  Sitting BP: 93/52, mild dizziness, slightly pale  Standing BP (with abdominal binder): unable to get reading in standing, had patient sit back down and then lay back down due to becoming dizzy and very pale, BP showing readying of 53/38 but unsure of accuracy since patient was moving arm and it was in bent position throughout getting laying back down. Did notify NAOMY Nelson) of this after session. Once laying back down and using BSC, patient reports feeling fine, no dizziness, normal color returned. Transfers:  Sit to Stand: Minimal Assistance, Moderate Assistance, X 2, cues for hand placement, from EOB x1 trial  Stand to Sit:Minimal Assistance, X 2    Ambulation:  Not Tested  **Unsafe for ambulation at this time d/t low BP and symptoms of orthostatic BP. Balance:  Static Sitting Balance:  Contact Guard Assistance, sitting EOB, verbal cues for not leaning posteriorly to improve balance, mild reports of dizziness. Static Standing Balance: Minimal Assistance, Moderate Assistance, X 2, HHA x2. Noted increased dizziness, pale color and sudden fatigue, quickly sat patient back down. Exercise:  Patient was guided in 1 set(s) 10 reps of exercise to both lower extremities. Ankle pumps, Glut sets, Quad sets, Heelslides, Hip abduction/adduction and Straight leg raises. Exercises were completed for increased independence with functional mobility. Functional Outcome Measures: Completed  AM-PAC Inpatient Mobility without Stair Climbing Raw Score : 12  AM-PAC Inpatient without Stair Climbing T-Scale Score : 37.26    ASSESSMENT:  Assessment: Patient progressing toward established goals. The patient tolerated session fair to poor, limited due to significant BP decrease when standing from EOB, immediately had patient sit back down and then lay down into bed, patient reports feeling better and symptoms dissipated. Will need additional skilled PT to increase strength, endurance, balance and safety for improved functional mobility.    Activity Tolerance: Patient tolerance of  treatment: poor. Limited by orthostatic BP issues. Equipment Recommendations:Equipment Needed: No  Discharge Recommendations:  Continue to assess pending progress, Subacute/Skilled Nursing Facility    Plan: Times per week: 5x GM  Current Treatment Recommendations: Strengthening, IADL Training, Home Exercise Program, Safety Education & Training, Balance Training, Endurance Training, Patient/Caregiver Education & Training, Functional Mobility Training, Equipment Evaluation, Education, & procurement, Transfer Training, Gait Training, Stair training, ADL/Self-care Training    Patient Education  Patient Education: Plan of Care, Precautions/Restrictions, [employer] Mobility, Transfers, Reviewed Prior Education, - Patient Requires Continued Education    Goals:  Patient goals : to go home  Short term goals  Time Frame for Short term goals: by d/c  Short term goal 1: supine <> sit supervision A for safe bed mobility  Short term goal 2: sit <> stand with 2WW and SBA for ease of transfer  Short term goal 3: ambulate >50 ft with 2WW and SBA for safe home ambulation  Short term goal 4: sitting balance activity with controlled BP and symptoms with SBA to improve functional mobility  Long term goals  Time Frame for Long term goals : N/A due to short ELOS    Following session, patient left in safe position with all fall risk precautions in place.

## 2020-12-02 NOTE — PROGRESS NOTES
Admit Date: 11/29/2020  Hospital day 1    Subjective:     Patient dizzy . Medication side effects: none    Scheduled Meds:   midodrine  10 mg Oral TID WC    fluticasone  1 spray Each Nostril Daily    DULoxetine  60 mg Oral Nightly    ferrous sulfate  325 mg Oral Daily with breakfast    Vitamin D  5,000 Units Oral Daily    metoprolol succinate  100 mg Oral Daily    rosuvastatin  10 mg Oral Daily    levothyroxine  75 mcg Oral Daily    fenofibrate  160 mg Oral Daily    pioglitazone  15 mg Oral Daily    aspirin  81 mg Oral Nightly    clopidogrel  75 mg Oral Daily    sodium chloride flush  10 mL Intravenous 2 times per day    enoxaparin  40 mg Subcutaneous Q24H    insulin NPH  8 Units Subcutaneous QPM    insulin regular  5 Units Subcutaneous BID AC    insulin NPH  7 Units Subcutaneous QAM    acetaminophen  500 mg Oral Daily    lansoprazole  30 mg Oral Daily     Continuous Infusions:   dextrose      sodium chloride 100 mL/hr at 12/02/20 1111     PRN Meds:loperamide, traMADol, tiZANidine, sodium chloride flush, acetaminophen **OR** acetaminophen, polyethylene glycol, promethazine **OR** ondansetron, glucose, glucagon (rDNA), dextrose, dextrose, ALPRAZolam    Review of Systems  Pertinent items are noted in HPI. Objective:     Patient Vitals for the past 8 hrs:   BP Temp Temp src Pulse Resp SpO2   12/02/20 1607 (!) 103/52 98.1 °F (36.7 °C) Oral 88 16 93 %   12/02/20 1041 (!) 104/55 98.2 °F (36.8 °C) Oral 83 16 96 %     I/O last 3 completed shifts:   In: 3170.6 [P.O.:980; I.V.:2190.6]  Out: 850 [Urine:850]    BP (!) 110/56   Pulse 93   Temp 98.4 °F (36.9 °C) (Oral)   Resp 18   Ht 5' 6\" (1.676 m)   Wt 162 lb 6.4 oz (73.7 kg)   SpO2 98%   BMI 26.21 kg/m²     General Appearance:    Alert, cooperative, no distress, appears stated age   Head:    Normocephalic, without obvious abnormality, atraumatic   Eyes:    PERRL, conjunctiva/corneas clear, EOM's intact, fundi     benign, both eyes        Ears: Normal TM's and external ear canals, both ears   Nose:   Nares normal, septum midline, mucosa normal, no drainage    or sinus tenderness   Throat:   Lips, mucosa, and tongue normal; teeth and gums normal   Neck:   Supple, symmetrical, trachea midline, no adenopathy;        thyroid:  No enlargement/tenderness/nodules; no carotid    bruit or JVD   Back:     Symmetric, no curvature, ROM normal, no CVA tenderness   Lungs:     Clear to auscultation bilaterally, respirations unlabored   Chest wall:    No tenderness or deformity   Heart:    Regular rate and rhythm, S1 and S2 normal, no murmur, rub   or gallop   Abdomen:     Soft, non-tender, bowel sounds active all four quadrants,     no masses, no organomegaly   Genitalia:    Normal male without lesion, discharge or tenderness   Rectal:    Normal tone, normal prostate, no masses or tenderness;    guaiac negative stool   Extremities:   Extremities normal, atraumatic, no cyanosis or edema   Pulses:   2+ and symmetric all extremities   Skin:   Skin color, texture, turgor normal, no rashes or lesions   Lymph nodes:   Cervical, supraclavicular, and axillary nodes normal   Neurologic:   CNII-XII intact. Normal strength, sensation and reflexes       throughout       ECG: normal sinus rhythm, no blocks or conduction defects, no ischemic changes.    Data Review:   CBC:  Lab Results   Component Value Date    WBC 11.1 11/30/2020    RBC 3.96 11/30/2020    RBC 0-2 10/01/2011    HGB 11.9 11/30/2020    HCT 36.5 11/30/2020    MCV 92.2 11/30/2020    MCH 30.1 11/30/2020    MCHC 32.6 11/30/2020    RDW 14.1 08/18/2020     11/30/2020    MPV 10.4 11/30/2020     BMP  Lab Results   Component Value Date     11/29/2020    K 4.9 11/29/2020    CL 94 11/29/2020    CO2 21 11/29/2020    BUN 24 11/29/2020    CREATININE 1.3 11/29/2020    CALCIUM 10.4 11/29/2020      COAG PROFILE:  Lab Results   Component Value Date    APTT 29.6 11/30/2018    INR 0.87 11/30/2018       Assessment:     Active Problems:    Syncope and collapse  Resolved Problems:    * No resolved hospital problems.  *      Plan:      still drowsy    will get neuro consult    postural hypotension    medication adjusted    dm    cad

## 2020-12-02 NOTE — CARE COORDINATION
12/2/20, 4:26 PM EST    DISCHARGE PLANNING EVALUATION  Spoke with Phuong Masters and his spouse Melonie Kemp. They are agreeable to [facility] at discharge. Will need new home health orders.

## 2020-12-02 NOTE — FLOWSHEET NOTE
12/02/20 1545   Provider Notification   Reason for Communication Evaluate   Provider Name Dr. Esther Licona   Provider Notification Physician   Method of Communication Call   Response See orders   Notification Time [phone]     Notified Dr. Esther Licona that patient remains hypotensive, Midodrine increased to 10mg TID.

## 2020-12-02 NOTE — PROGRESS NOTES
[email or web address] THERAPY MISSED TREATMENT NOTE  [facility] NEUROSCIENCES 4A  4A-11/011-A      Date: 2020  Patient Name: Debi Villanueva        CSN: [id number]   : 1940  (78 y.o.)  Gender: male   Referring Practitioner: Dr. Gifty Washington  Diagnosis: syncope & collapse         REASON FOR MISSED TREATMENT: Attempt x 1, PT with Pt. Will check back as time allows.

## 2020-12-02 NOTE — CARE COORDINATION
DISASTER CHARTING    12/2/20, 2:03 PM EST    DISCHARGE ONGOING EVALUATION:     [facility] day: 2  Location: 4A-11/011-A Reason for admit: Syncope and collapse [R55]  Syncope and collapse [R55]   Barriers to Discharge: PT/OT, Cardiology following, Neurology consult, IV fluids, diabetes management, Midodrine TID. PCP: Susanna Camargo  Patient Goals/Plan/Treatment Preferences: Plan is to return home with spouse.

## 2020-12-02 NOTE — PROGRESS NOTES
Unable to do MRI of brain, pt has a spinal cord stimulator. Unable to meet guidelines set by the  for scanning. We need a transmit receive head coil in order to do exam, which we do not have at [address] [place] [address].

## 2020-12-03 PROBLEM — E11.49 TYPE 2 DIABETES MELLITUS WITH NEUROLOGIC COMPLICATION (HCC): Chronic | Status: ACTIVE | Noted: 2020-12-03

## 2020-12-03 PROBLEM — R27.8 SENSORY ATAXIA: Status: ACTIVE | Noted: 2020-12-03

## 2020-12-03 LAB
FOLATE: 18.7 NG/ML (ref 4.8–24.2)
GLUCOSE BLD-MCNC: 114 MG/DL (ref 70–108)
GLUCOSE BLD-MCNC: 167 MG/DL (ref 70–108)
GLUCOSE BLD-MCNC: 195 MG/DL (ref 70–108)
GLUCOSE BLD-MCNC: 225 MG/DL (ref 70–108)
VITAMIN B-12: 798 PG/ML (ref 211–911)

## 2020-12-03 PROCEDURE — 94760 N-INVAS EAR/PLS OXIMETRY 1: CPT

## 2020-12-03 PROCEDURE — 6370000000 HC RX 637 (ALT 250 FOR IP): Performed by: INTERNAL MEDICINE

## 2020-12-03 PROCEDURE — 82948 REAGENT STRIP/BLOOD GLUCOSE: CPT

## 2020-12-03 PROCEDURE — 97110 THERAPEUTIC EXERCISES: CPT

## 2020-12-03 PROCEDURE — 2060000000 HC ICU INTERMEDIATE R&B

## 2020-12-03 PROCEDURE — 2580000003 HC RX 258: Performed by: INTERNAL MEDICINE

## 2020-12-03 PROCEDURE — 82607 VITAMIN B-12: CPT

## 2020-12-03 PROCEDURE — 36415 COLL VENOUS BLD VENIPUNCTURE: CPT

## 2020-12-03 PROCEDURE — 82746 ASSAY OF FOLIC ACID SERUM: CPT

## 2020-12-03 PROCEDURE — 6360000002 HC RX W HCPCS: Performed by: INTERNAL MEDICINE

## 2020-12-03 RX ADMIN — ENOXAPARIN SODIUM 40 MG: 40 INJECTION SUBCUTANEOUS at 16:20

## 2020-12-03 RX ADMIN — FERROUS SULFATE TAB 325 MG (65 MG ELEMENTAL FE) 325 MG: 325 (65 FE) TAB at 10:18

## 2020-12-03 RX ADMIN — FENOFIBRATE 160 MG: 160 TABLET, FILM COATED ORAL at 10:18

## 2020-12-03 RX ADMIN — FLUTICASONE PROPIONATE 1 SPRAY: 50 SPRAY, METERED NASAL at 10:20

## 2020-12-03 RX ADMIN — ACETAMINOPHEN 500 MG: 500 TABLET ORAL at 10:18

## 2020-12-03 RX ADMIN — SODIUM CHLORIDE: 4.5 INJECTION, SOLUTION INTRAVENOUS at 03:17

## 2020-12-03 RX ADMIN — PIOGLITAZONE 15 MG: 15 TABLET ORAL at 10:18

## 2020-12-03 RX ADMIN — DULOXETINE HYDROCHLORIDE 60 MG: 60 CAPSULE, DELAYED RELEASE ORAL at 20:50

## 2020-12-03 RX ADMIN — MIDODRINE HYDROCHLORIDE 10 MG: 10 TABLET ORAL at 12:45

## 2020-12-03 RX ADMIN — LANSOPRAZOLE 30 MG: 15 TABLET, ORALLY DISINTEGRATING, DELAYED RELEASE ORAL at 10:18

## 2020-12-03 RX ADMIN — ROSUVASTATIN CALCIUM 10 MG: 10 TABLET, FILM COATED ORAL at 10:18

## 2020-12-03 RX ADMIN — LEVOTHYROXINE SODIUM 75 MCG: 75 TABLET ORAL at 06:25

## 2020-12-03 RX ADMIN — MIDODRINE HYDROCHLORIDE 10 MG: 10 TABLET ORAL at 10:22

## 2020-12-03 RX ADMIN — ASPIRIN 81 MG: 81 TABLET, CHEWABLE ORAL at 20:50

## 2020-12-03 RX ADMIN — CLOPIDOGREL BISULFATE 75 MG: 75 TABLET, FILM COATED ORAL at 10:18

## 2020-12-03 RX ADMIN — SODIUM CHLORIDE: 4.5 INJECTION, SOLUTION INTRAVENOUS at 16:26

## 2020-12-03 RX ADMIN — MIDODRINE HYDROCHLORIDE 10 MG: 10 TABLET ORAL at 16:20

## 2020-12-03 RX ADMIN — Medication 5000 UNITS: at 10:18

## 2020-12-03 ASSESSMENT — PAIN DESCRIPTION - ORIENTATION: ORIENTATION: ANTERIOR

## 2020-12-03 ASSESSMENT — PAIN DESCRIPTION - LOCATION: LOCATION: HEAD

## 2020-12-03 ASSESSMENT — PAIN DESCRIPTION - PAIN TYPE: TYPE: ACUTE PAIN

## 2020-12-03 ASSESSMENT — PAIN - FUNCTIONAL ASSESSMENT: PAIN_FUNCTIONAL_ASSESSMENT: ACTIVITIES ARE NOT PREVENTED

## 2020-12-03 ASSESSMENT — PAIN SCALES - GENERAL
PAINLEVEL_OUTOF10: 1
PAINLEVEL_OUTOF10: 0

## 2020-12-03 ASSESSMENT — PAIN DESCRIPTION - DESCRIPTORS: DESCRIPTORS: HEADACHE

## 2020-12-03 ASSESSMENT — PAIN DESCRIPTION - PROGRESSION: CLINICAL_PROGRESSION: GRADUALLY WORSENING

## 2020-12-03 ASSESSMENT — PAIN DESCRIPTION - FREQUENCY: FREQUENCY: INTERMITTENT

## 2020-12-03 ASSESSMENT — PAIN DESCRIPTION - ONSET: ONSET: ON-GOING

## 2020-12-03 NOTE — PROGRESS NOTES
Admit Date: 11/29/2020  Hospital day 2    Subjective:     Patient dizziness . Medication side effects: none    Scheduled Meds:   midodrine  10 mg Oral TID WC    fluticasone  1 spray Each Nostril Daily    DULoxetine  60 mg Oral Nightly    ferrous sulfate  325 mg Oral Daily with breakfast    Vitamin D  5,000 Units Oral Daily    metoprolol succinate  100 mg Oral Daily    rosuvastatin  10 mg Oral Daily    levothyroxine  75 mcg Oral Daily    fenofibrate  160 mg Oral Daily    pioglitazone  15 mg Oral Daily    aspirin  81 mg Oral Nightly    clopidogrel  75 mg Oral Daily    sodium chloride flush  10 mL Intravenous 2 times per day    enoxaparin  40 mg Subcutaneous Q24H    insulin NPH  8 Units Subcutaneous QPM    insulin regular  5 Units Subcutaneous BID AC    insulin NPH  7 Units Subcutaneous QAM    acetaminophen  500 mg Oral Daily    lansoprazole  30 mg Oral Daily     Continuous Infusions:   dextrose      sodium chloride 100 mL/hr at 12/03/20 1626     PRN Meds:loperamide, traMADol, tiZANidine, sodium chloride flush, acetaminophen **OR** acetaminophen, polyethylene glycol, promethazine **OR** ondansetron, glucose, glucagon (rDNA), dextrose, dextrose, ALPRAZolam    Review of Systems  Pertinent items are noted in HPI. Objective:     Patient Vitals for the past 8 hrs:   BP Temp Temp src Pulse Resp SpO2   12/03/20 1548 (!) 115/55 99 °F (37.2 °C) Oral 100 16 96 %   12/03/20 1244 122/60 -- -- 87 -- --   12/03/20 1203 -- 99.3 °F (37.4 °C) Oral -- 18 97 %   12/03/20 0950 (!) 88/51 98.4 °F (36.9 °C) Oral 111 16 98 %     I/O last 3 completed shifts: In: 5183 [P.O.:240; I.V.:1073]  Out: 825 [Urine:825]    No change     ECG: normal sinus rhythm, no blocks or conduction defects, no ischemic changes.    Data Review:   CBC:  Lab Results   Component Value Date    WBC 11.1 11/30/2020    RBC 3.96 11/30/2020    RBC 0-2 10/01/2011    HGB 11.9 11/30/2020    HCT 36.5 11/30/2020    MCV 92.2 11/30/2020    MCH 30.1 11/30/2020    MCHC 32.6 11/30/2020    RDW 14.1 08/18/2020     11/30/2020    MPV 10.4 11/30/2020     BMP  Lab Results   Component Value Date     11/29/2020    K 4.9 11/29/2020    CL 94 11/29/2020    CO2 21 11/29/2020    BUN 24 11/29/2020    CREATININE 1.3 11/29/2020    CALCIUM 10.4 11/29/2020      COAG PROFILE:  Lab Results   Component Value Date    APTT 29.6 11/30/2018    INR 0.87 11/30/2018       Assessment:     Principal Problem:    Syncope and collapse  Active Problems:    MCI (mild cognitive impairment) with memory loss    Chronic pain syndrome    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  *      Plan:      patient still feeling dizzy    still having postural hypotension    medication adjusted    neuro in put appreciated    dm    cad    change in mental status

## 2020-12-03 NOTE — FLOWSHEET NOTE
12/03/20 1203   Vitals   Orthostatic B/P and Pulse?  Yes   Blood Pressure Lying 144/63   Pulse Lying 84 PER MINUTE   Blood Pressure Sitting 116/55   Pulse Sitting 76 PER MINUTE

## 2020-12-03 NOTE — CARE COORDINATION
12/3/20, 3:06 PM EST    DISCHARGE PLANNING EVALUATION  Called and left a message for intake at [facility]. Need to make referral for new home health for RN, PT, OT and aid. Will need orders written for home health.

## 2020-12-04 VITALS
TEMPERATURE: 98.4 F | RESPIRATION RATE: 18 BRPM | OXYGEN SATURATION: 98 % | SYSTOLIC BLOOD PRESSURE: 110 MMHG | HEART RATE: 93 BPM | HEIGHT: 66 IN | BODY MASS INDEX: 26.1 KG/M2 | DIASTOLIC BLOOD PRESSURE: 56 MMHG | WEIGHT: 162.4 LBS

## 2020-12-04 LAB
ANION GAP SERPL CALCULATED.3IONS-SCNC: 13 MEQ/L (ref 8–16)
BUN BLDV-MCNC: 8 MG/DL (ref 7–22)
CALCIUM SERPL-MCNC: 9.1 MG/DL (ref 8.5–10.5)
CHLORIDE BLD-SCNC: 101 MEQ/L (ref 98–111)
CO2: 21 MEQ/L (ref 23–33)
CREAT SERPL-MCNC: 0.8 MG/DL (ref 0.4–1.2)
ERYTHROCYTE [DISTWIDTH] IN BLOOD BY AUTOMATED COUNT: 13.8 % (ref 11.5–14.5)
ERYTHROCYTE [DISTWIDTH] IN BLOOD BY AUTOMATED COUNT: 46 FL (ref 35–45)
GFR SERPL CREATININE-BSD FRML MDRD: > 90 ML/MIN/1.73M2
GLUCOSE BLD-MCNC: 130 MG/DL (ref 70–108)
GLUCOSE BLD-MCNC: 185 MG/DL (ref 70–108)
GLUCOSE BLD-MCNC: 77 MG/DL (ref 70–108)
GLUCOSE BLD-MCNC: 82 MG/DL (ref 70–108)
HCT VFR BLD CALC: 34.5 % (ref 42–52)
HEMOGLOBIN: 11.3 GM/DL (ref 14–18)
MCH RBC QN AUTO: 30.1 PG (ref 26–33)
MCHC RBC AUTO-ENTMCNC: 32.8 GM/DL (ref 32.2–35.5)
MCV RBC AUTO: 92 FL (ref 80–94)
PLATELET # BLD: 283 THOU/MM3 (ref 130–400)
PMV BLD AUTO: 9.7 FL (ref 9.4–12.4)
POTASSIUM SERPL-SCNC: 3.6 MEQ/L (ref 3.5–5.2)
RBC # BLD: 3.75 MILL/MM3 (ref 4.7–6.1)
SODIUM BLD-SCNC: 135 MEQ/L (ref 135–145)
WBC # BLD: 7.8 THOU/MM3 (ref 4.8–10.8)

## 2020-12-04 PROCEDURE — 6360000002 HC RX W HCPCS: Performed by: INTERNAL MEDICINE

## 2020-12-04 PROCEDURE — 97535 SELF CARE MNGMENT TRAINING: CPT

## 2020-12-04 PROCEDURE — 97530 THERAPEUTIC ACTIVITIES: CPT

## 2020-12-04 PROCEDURE — 82948 REAGENT STRIP/BLOOD GLUCOSE: CPT

## 2020-12-04 PROCEDURE — 6370000000 HC RX 637 (ALT 250 FOR IP): Performed by: INTERNAL MEDICINE

## 2020-12-04 PROCEDURE — 80048 BASIC METABOLIC PNL TOTAL CA: CPT

## 2020-12-04 PROCEDURE — 36415 COLL VENOUS BLD VENIPUNCTURE: CPT

## 2020-12-04 PROCEDURE — 85027 COMPLETE CBC AUTOMATED: CPT

## 2020-12-04 PROCEDURE — 94761 N-INVAS EAR/PLS OXIMETRY MLT: CPT

## 2020-12-04 PROCEDURE — 2580000003 HC RX 258: Performed by: INTERNAL MEDICINE

## 2020-12-04 RX ORDER — FLUDROCORTISONE ACETATE 0.1 MG/1
0.1 TABLET ORAL DAILY
Status: DISCONTINUED | OUTPATIENT
Start: 2020-12-04 | End: 2020-12-04 | Stop reason: HOSPADM

## 2020-12-04 RX ORDER — CITALOPRAM 20 MG/1
20 TABLET ORAL DAILY
Status: DISCONTINUED | OUTPATIENT
Start: 2020-12-04 | End: 2020-12-04 | Stop reason: HOSPADM

## 2020-12-04 RX ORDER — MIDODRINE HYDROCHLORIDE 10 MG/1
10 TABLET ORAL
Qty: 90 TABLET | Refills: 3 | Status: SHIPPED | OUTPATIENT
Start: 2020-12-04 | End: 2022-08-17 | Stop reason: SDUPTHER

## 2020-12-04 RX ORDER — CITALOPRAM 20 MG/1
20 TABLET ORAL DAILY
Qty: 30 TABLET | Refills: 3 | Status: SHIPPED | OUTPATIENT
Start: 2020-12-05 | End: 2022-10-11

## 2020-12-04 RX ORDER — FLUDROCORTISONE ACETATE 0.1 MG/1
0.1 TABLET ORAL DAILY
Qty: 30 TABLET | Refills: 3 | Status: SHIPPED | OUTPATIENT
Start: 2020-12-05 | End: 2021-01-04

## 2020-12-04 RX ADMIN — ACETAMINOPHEN 500 MG: 500 TABLET ORAL at 09:59

## 2020-12-04 RX ADMIN — LANSOPRAZOLE 30 MG: 15 TABLET, ORALLY DISINTEGRATING, DELAYED RELEASE ORAL at 09:59

## 2020-12-04 RX ADMIN — FLUTICASONE PROPIONATE 1 SPRAY: 50 SPRAY, METERED NASAL at 10:17

## 2020-12-04 RX ADMIN — ENOXAPARIN SODIUM 40 MG: 40 INJECTION SUBCUTANEOUS at 13:16

## 2020-12-04 RX ADMIN — MIDODRINE HYDROCHLORIDE 10 MG: 10 TABLET ORAL at 09:59

## 2020-12-04 RX ADMIN — MIDODRINE HYDROCHLORIDE 10 MG: 10 TABLET ORAL at 16:37

## 2020-12-04 RX ADMIN — METOPROLOL SUCCINATE 100 MG: 100 TABLET, FILM COATED, EXTENDED RELEASE ORAL at 09:58

## 2020-12-04 RX ADMIN — CLOPIDOGREL BISULFATE 75 MG: 75 TABLET, FILM COATED ORAL at 09:59

## 2020-12-04 RX ADMIN — PIOGLITAZONE 15 MG: 15 TABLET ORAL at 09:59

## 2020-12-04 RX ADMIN — LEVOTHYROXINE SODIUM 75 MCG: 75 TABLET ORAL at 06:39

## 2020-12-04 RX ADMIN — FLUDROCORTISONE ACETATE 0.1 MG: 0.1 TABLET ORAL at 11:46

## 2020-12-04 RX ADMIN — SODIUM CHLORIDE: 4.5 INJECTION, SOLUTION INTRAVENOUS at 00:09

## 2020-12-04 RX ADMIN — CITALOPRAM 20 MG: 20 TABLET, FILM COATED ORAL at 11:46

## 2020-12-04 RX ADMIN — Medication 5000 UNITS: at 09:58

## 2020-12-04 RX ADMIN — FERROUS SULFATE TAB 325 MG (65 MG ELEMENTAL FE) 325 MG: 325 (65 FE) TAB at 09:59

## 2020-12-04 RX ADMIN — MIDODRINE HYDROCHLORIDE 10 MG: 10 TABLET ORAL at 13:16

## 2020-12-04 ASSESSMENT — PAIN SCALES - GENERAL
PAINLEVEL_OUTOF10: 0
PAINLEVEL_OUTOF10: 1
PAINLEVEL_OUTOF10: 0

## 2020-12-04 NOTE — PROGRESS NOTES
Dr Anjel Riley here again and able to speak w/ pt's wife also. OKs discharge but wife needs to wait for children to arrive from out of town to help her get him into the house, etc.  Plan probably 6:30 or 7pm.  SW to visit wife to discuss [facility].

## 2020-12-04 NOTE — PROGRESS NOTES
This RN gave paper prescriptions for fludrocortisone, midodrine, and citalopram to patient's wife Anna Isabel to get filled at the pharmacy prior to patient being discharged this evening at 6:00pm.

## 2020-12-04 NOTE — PROGRESS NOTES
[address]  Occupational Therapy  Daily Note  Time:   Time In: 1000  Time Out: 1025  Timed Code Treatment Minutes: 25 Minutes  Minutes: 25        Co-tx with PT as pt requires +2 for Safety. Date: 2020  Patient Name: Desirae Escudero,   Gender: male      Room: [facility]011-A  MRN: [id number]  : 1940  (78 y.o.)  Referring Practitioner: Dr. Adela Gagnon  Diagnosis: syncope & collapse  Additional Pertinent Hx: Per ER note on 2020:  78 y.o. male who presents to the Emergency Department for the evaluation of syncopal episode. The patient has recent memory issues. Poor historian. The patient reports he does not remember what happened this morning. I contacted the patient's wife by phone. She states for the past several weeks he has been increased fatigue. He lays in bed most of the day. He gets up to eat and use the restroom but otherwise has been very sedentary. Restrictions/Precautions:  Restrictions/Precautions: Fall Risk  Position Activity Restriction  Other position/activity restrictions: + ortho BP: abdominal binder + TEDs prior to EOB     SUBJECTIVE: Pt seated upright in bed upon arrival, agreeable to therapy session. RN okayed session insisting pt trial up to chair as he is a probable d/c this PM- present for entire session. PAIN: No c/o. COGNITION: Slow Processing, Decreased Recall, Decreased Insight, Decreased Problem Solving and Decreased Safety Awareness    ADL:   Upper Extremity Dressing: Maximum Assistance. Pt's wife donning abdominal binder while pt seated EOB- verbal education provided for proper tech  Lower Extremity Dressing: Maximum Assistance. To complete hygiene and doff soiled brief and donning clean set  Toileting: Dependent. for hygiene and clothing management after urinary incontinence. Carolyn Camargo BALANCE:  Sitting Balance:  Stand By Assistance.  EOB  Standing Balance: Minimal Assistance, X 2.    x1 minute 30 secs x1 trial for BP reading. BED MOBILITY:  Supine to Sit: Contact Guard Assistance    Scooting: Contact Guard Assistance EOB    TRANSFERS:  Sit to Stand:  Minimal Assistance, X 2.    Stand to Sit: Contact Guard Assistance, X 2.      FUNCTIONAL MOBILITY:  Assistive Device: Rolling Walker  Assist Level:  Minimal Assistance and X 2. Distance:   Completed functional mobility short distance within pt room at slow pace, no LOB noted. Pt requires +2 assist for safety- seated rest break after trial of mobility, min fatigue noted. ADDITIONAL ACTIVITIES:  BP monitored closely throughout session:  Lyin/55  Seated EOB: 90/53  Standing at RW: 78/53 after 1 minute 30s of standing  Seated in chair: 109/58    ASSESSMENT:     Activity Tolerance:  Patient tolerance of  treatment: fair. Pt limited by orthostatics      Discharge Recommendations: Continue to assess pending progress   Equipment Recommendations: Other: Monitor pending progress  Plan: Times per week: 5x  Current Treatment Recommendations: Balance Training, Functional Mobility Training, Endurance Training, Safety Education & Training, Self-Care / ADL    Patient Education  Patient Education: ADL's, Family Education, Importance of Increasing Activity, Assistive Device Safety and Safety with transfers and mobility. Goals  Short term goals  Time Frame for Short term goals: until discharge  Short term goal 1: Tolerate sitting EOB 5-6 min with S for increased endurance for EOB ADLs  Short term goal 2: Tolerate standing 1 min with min A x 1 for increased ease of toileting routine  Short term goal 3: Tolerate further assessment of functional mobility by OTR when appropriate  Long term goals  Time Frame for Long term goals : No LTG set d/t short ELOS    Following session, patient left in safe position with all fall risk precautions in place.

## 2020-12-04 NOTE — PROGRESS NOTES
has history of coronary artery disease, thyroid disease, hyperlipidemia. \"     Prior Level of Function:  Lives With: Spouse  Type of Home: House  Home Layout: One level  Home Access: Stairs to enter with rails  Entrance Stairs - Number of Steps: 2  Entrance Stairs - Rails: Right  Home Equipment: Rolling walker   Bathroom Shower/Tub: Walk-in shower  Bathroom Toilet: Handicap height  Bathroom Equipment: Shower chair, Grab bars in shower, Grab bars around toilet    ADL Assistance: Needs assistance(wife A with BADL in shower. s/u for dressing)  Homemaking Assistance: Needs assistance  Ambulation Assistance: Independent  Transfer Assistance: Independent  Active : Yes  Additional Comments: Wife reports Pt was indep with RW PLOF. Pt's chronic back problems limited mobility distance. Restrictions/Precautions:  Restrictions/Precautions: Fall Risk  Position Activity Restriction  Other position/activity restrictions: + ortho BP: abdominal binder + TEDs prior to EOB     SUBJECTIVE: RN approved session and present throughout session. Orthostatics taken during session, pt with +orthostatics and dizziness. 2 assist for safety    PAIN: 0/10: Denies pain    Orthostatics:  BP monitored closely throughout session:  Supine: 110/55  Seated EOB: 90/53  Standing at RW: 78/53 after 1 minute 30s of standing  Seated in chair: 109/58    Staff in to alert therapist of pts HR in 140s while standing. OBJECTIVE:  Bed Mobility:  Rolling to Left: Modified Independent   Supine to Sit: Contact Guard Assistance    Transfers:  Sit to Stand: Minimal Assistance, X 2  Stand to Sit:Contact [employer], X 2    Ambulation:  Minimal Assistance, X 2  Distance: 4 feet. Distance limited due to low BP and pt with complaints of dizziness. Surface: Level Tile  Device:Rolling Walker  Gait Deviations:   Forward Flexed Posture, Slow Liudmila, Decreased Step Length Bilaterally, Decreased Gait Speed and Decreased Heel Strike Bilaterally    Balance:  Dynamic Sitting Balance: Stand By Assistance, Contact Guard Assistance, X 1, Focussed on sitting balance and safety. Complaints of dizziness, cuing for deep breathing and to keep eyes open. Dynamic Standing Balance: Contact Guard Assistance, Minimal Assistance, X 2. Functional Outcome Measures: Completed  AM-PAC Inpatient Mobility without Stair Climbing Raw Score : 14  AM-PAC Inpatient without Stair Climbing T-Scale Score : 40.85    ASSESSMENT:  Assessment: Patient progressing toward established goals. and Pt tolerated session fair. Limited by positive orthostatics, dizziness, decreased strength, decreased mobility. +2 while up for safety due to pt passing out at home from low BP. Pt would greatly benefit from continued therapy before returning home. Activity Tolerance:  Patient tolerance of  treatment: good.         Equipment Recommendations:Equipment Needed: No  Discharge Recommendations:  Continue to assess pending progress, Subacute/Skilled Nursing Facility    Plan: Times per week: 5x GM  Current Treatment Recommendations: Strengthening, IADL Training, Home Exercise Program, Safety Education & Training, Balance Training, Endurance Training, Patient/Caregiver Education & Training, Functional Mobility Training, Equipment Evaluation, Education, & procurement, Transfer Training, Gait Training, Stair training, ADL/Self-care Training    Patient Education  Patient Education: Plan of Care, [employer] Mobility, Transfers    Goals:  Patient goals : to go home  Short term goals  Time Frame for Short term goals: by d/c  Short term goal 1: supine <> sit supervision A for safe bed mobility  Short term goal 2: sit <> stand with 2WW and SBA for ease of transfer  Short term goal 3: ambulate >50 ft with 2WW and SBA for safe home ambulation  Short term goal 4: sitting balance activity with controlled BP and symptoms with SBA to improve functional mobility  Long term goals  Time Frame for Long term goals : N/A due to short ELOS    Following session, patient left in safe position with all fall risk precautions in place.

## 2020-12-04 NOTE — PROGRESS NOTES
MENDOZAS reviewed with patient and his wife Triston at bedside. Educated on new medications, when to take next doses, where to  prescriptions, and future follow up appointments. Neva verbalized understanding, all questions answered at this time. Patient assisted to the discharge lobby at this time via wheelchair and discharged with all personal belongings. This RN called [phone] and informed that patient has been discharged.

## 2020-12-04 NOTE — CARE COORDINATION
12/4/20, 7:47 AM EST    DISCHARGE PLANNING EVALUATION  Received a message for Familia Ravi at [facility]. They will put him on their list.  Will make them aware when patient is discharge. Will need new home health orders. 12/4/20, 2:14 PM EST    Patient goals/plan/ treatment preferences discussed by  and . Patient goals/plan/ treatment preferences reviewed with patient/ family. Patient/ family verbalize understanding of discharge plan and are in agreement with goal/plan/treatment preferences. Understanding was demonstrated using the teach back method. AVS provided by RN at time of discharge, which includes all necessary medical information pertaining to the patients current course of illness, treatment, post-discharge goals of care, and treatment preferences. Services After Discharge  Services At/After Discharge: Nursing Services, OT, PT, Aide Services([address])   IMM Letter  IMM Letter given to Patient/Family/Significant other/Guardian/POA/by[de-identified]   IMM Letter date given[de-identified] 12/04/20  IMM Letter time given[de-identified] Carlotta Mishra will be discharged to home today where he lives with his spouse. Their nephew is coming from [place] to help bring him home from the hospital.  Gave spouse Anna Isabel a script for a 3 in 1 commode. She would also like [facility] for RN, PT, OT and aid. Jose Antonio Gary at [facility] is aware of discharge.

## 2020-12-04 NOTE — PROGRESS NOTES
Admit Date: 11/29/2020  Hospital day 1    Subjective:     Patient dizzy . Medication side effects: none    Scheduled Meds:   citalopram  20 mg Oral Daily    fludrocortisone  0.1 mg Oral Daily    midodrine  10 mg Oral TID WC    fluticasone  1 spray Each Nostril Daily    ferrous sulfate  325 mg Oral Daily with breakfast    Vitamin D  5,000 Units Oral Daily    metoprolol succinate  100 mg Oral Daily    levothyroxine  75 mcg Oral Daily    pioglitazone  15 mg Oral Daily    aspirin  81 mg Oral Nightly    clopidogrel  75 mg Oral Daily    sodium chloride flush  10 mL Intravenous 2 times per day    enoxaparin  40 mg Subcutaneous Q24H    insulin NPH  8 Units Subcutaneous QPM    insulin regular  5 Units Subcutaneous BID AC    insulin NPH  7 Units Subcutaneous QAM    acetaminophen  500 mg Oral Daily    lansoprazole  30 mg Oral Daily     Continuous Infusions:   dextrose      sodium chloride 100 mL/hr at 12/04/20 0009     PRN Meds:loperamide, traMADol, tiZANidine, sodium chloride flush, acetaminophen **OR** acetaminophen, polyethylene glycol, promethazine **OR** ondansetron, glucose, glucagon (rDNA), dextrose, dextrose    Review of Systems  Pertinent items are noted in HPI. Objective:     Patient Vitals for the past 8 hrs:   BP Temp Temp src Pulse Resp SpO2   12/04/20 1158 (!) 110/56 98.4 °F (36.9 °C) Oral 93 18 98 %   12/04/20 1021 -- -- -- 127 -- --   12/04/20 1015 -- -- -- 140 -- --   12/04/20 0930 (!) 100/58 98.1 °F (36.7 °C) Oral 120 16 98 %   12/04/20 0508 -- -- -- -- -- 96 %     I/O last 3 completed shifts: In: 2817.7 [P.O.:720; I.V.:2097.7]  Out: 1075 [Urine:1075]    No change     ECG: normal sinus rhythm, no blocks or conduction defects, no ischemic changes.    Data Review:   CBC:  Lab Results   Component Value Date    WBC 7.8 12/04/2020    RBC 3.75 12/04/2020    RBC 0-2 10/01/2011    HGB 11.3 12/04/2020    HCT 34.5 12/04/2020    MCV 92.0 12/04/2020    MCH 30.1 12/04/2020    MCHC 32.8 12/04/2020 RDW 14.1 08/18/2020     12/04/2020    MPV 9.7 12/04/2020     BMP  Lab Results   Component Value Date     12/04/2020    K 3.6 12/04/2020     12/04/2020    CO2 21 12/04/2020    BUN 8 12/04/2020    CREATININE 0.8 12/04/2020    CALCIUM 9.1 12/04/2020      COAG PROFILE:  Lab Results   Component Value Date    APTT 29.6 11/30/2018    INR 0.87 11/30/2018       Assessment:     Principal Problem:    Syncope and collapse  Active Problems:    MCI (mild cognitive impairment) with memory loss    Chronic pain syndrome    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  *      Plan:      patient had significant postural hypotension    will give iv fluids    stop altace    dm    cad change in mental lc

## 2020-12-05 ENCOUNTER — CARE COORDINATION (OUTPATIENT)
Dept: CASE MANAGEMENT | Age: 80
End: 2020-12-05

## 2020-12-05 NOTE — DISCHARGE SUMMARY
[id number] discharge summary dictated
able to sit up on the chair and move into  the room without being dizzy. The patient was offered to have a nursing  home placement, he refused it. The patient will be discharged home to  follow up with the family physician to be seen in the office, seek  medical attention if he has any change of clinical condition. He was  discharged home in a stable condition. Vivian Valenzuela M.D.    D: 12/04/2020 12:57:09       T: 12/04/2020 23:11:05     [email or web address]  Job#: [id number]     Doc#: [id number]    CC:  Елена Tamayo.  Obinna Tavares M.D.

## 2020-12-05 NOTE — CARE COORDINATION
[facility] Transitions Initial Follow Up Call    Call within 2 business days of discharge: Yes    Patient: Tracey Yanez Patient : 1940   MRN: <[id number]>  Reason for Admission:   Syncope/ collapse; Discharge Date: 20 RARS: Readmission Risk Score: 15      Last Discharge [address] [address]       Complaint Diagnosis Description Type Department Provider    20 Fall; Loss of Consciousness Syncope and collapse . .. ED to Hosp-Admission (Discharged) (ADMITTED) Luis Duong MD             Follow Up; Attempted patient contact x 2. No answer to phone. Message left with CTN contact information and request for return call. Message left for [facility] to confirm patient services. CTN contact information provided with request to return call.   Future Appointments   Date Time Provider Paul Beyer   2021  4:00 PM DIOGO Wise - CNP [facility] - Eleanor Lee RN

## 2020-12-06 LAB — BLOOD CULTURE, ROUTINE: NORMAL

## 2020-12-07 ENCOUNTER — CARE COORDINATION (OUTPATIENT)
Dept: CASE MANAGEMENT | Age: 80
End: 2020-12-07

## 2021-02-01 ENCOUNTER — VIRTUAL VISIT (OUTPATIENT)
Dept: PHYSICAL MEDICINE AND REHAB | Age: 81
End: 2021-02-01
Payer: MEDICARE

## 2021-02-01 DIAGNOSIS — M96.1 FAILED BACK SYNDROME OF LUMBAR SPINE: Primary | ICD-10-CM

## 2021-02-01 DIAGNOSIS — M17.0 PRIMARY OSTEOARTHRITIS OF BOTH KNEES: ICD-10-CM

## 2021-02-01 DIAGNOSIS — M54.17 LUMBOSACRAL RADICULITIS: ICD-10-CM

## 2021-02-01 PROCEDURE — 99214 OFFICE O/P EST MOD 30 MIN: CPT | Performed by: NURSE PRACTITIONER

## 2021-02-01 ASSESSMENT — ENCOUNTER SYMPTOMS
SHORTNESS OF BREATH: 0
SORE THROAT: 0
COLOR CHANGE: 0
CHEST TIGHTNESS: 0
ABDOMINAL PAIN: 0
DIARRHEA: 0
SINUS PRESSURE: 0
WHEEZING: 0
COUGH: 0
EYE PAIN: 0
RHINORRHEA: 0
CONSTIPATION: 0
PHOTOPHOBIA: 0
BACK PAIN: 1
NAUSEA: 0
VOMITING: 0

## 2021-02-01 NOTE — PROGRESS NOTES
[address]  Dept: [phone]  Dept Fax: [phone]: [phone]    Visit Date: 2/1/2021    Functionality Assessment/Goals Worksheet     On a scale of 0 (Does not Interfere) to 10 (Completely Interferes)     1. Which number describes how during the past week pain has interfered with       the following:  A. General Activity:  4  B. Mood: 1  C. Walking Ability:  4  D. Normal Work (Includes both work outside the home and housework):  4  E. Relations with Other People:   0  F. Sleep:   2  G. Enjoyment of Life:   4    2. Patient Prefers to Take their Pain Medications:     []  On a regular basis   []  Only when necessary    []  Does not take pain medications    3. What are the Patient's Goals/Expectations for Visiting Pain Management?      []  Learn about my pain    [x]  Receive Medication   []  Physical Therapy     []  Treat Depression   [x]  Receive Injections    []  Treat Sleep   []  Deal with Anxiety and Stress   []  Treat Opoid Dependence/Addiction   []  Other:    HPI:   Cierra Bravo is a 78 y.o. male is here today for    Chief Complaint: Low back pain, Mid back pain, Right leg pain, Left leg pain and Hip pain  SI pain TELEHEALTH EVALUATION -- Audio/Visual (During CDLAE-48 public health emergency) This service is provided through Telehealth. Patient reports that place of service was home. Provider was located at their officeEvaluation of the following organ systems was limited: Vitals/Constitutional/EENT/Resp/CV/GI//MS/Neuro/Skin/Heme-Lymph-Imm. Pursuant to the emergency declaration under the [facility], [address] [place] [address] and the [employer] Act, this Virtual Visit was conducted with patient's (and/or legal guardian's) consent, to reduce the patient's risk of exposure to COVID-19 and provide necessary medical care. This Telehealth audio/visual visit was conducted using D[employer]. me. Patient identification was verified at the start of this visit: yes      HPI   He continues to have low back mid back  Hips SI pain. He has SCS placed in 2018 with Dr Camilla Moscoso  He no  Longer uses since he got out of hospital.  He was in the hospital 11/29/2020-12/4/2020 for AMS Postural hypotension CAD  DM. He has not been taking Tramadol  Cardiologist said to stop it. He does take the Zanaflex occasionally. He has increased low back pain with walking standing. He has turned off th stimulator and no longer using. He states his low back  pain is doing well. He has had 2 lumbar surgeries. Wife states pt has been forgetful. Cherelle Dues He complains of any ER visits since last visit. AMS     Pain scale with out pain medications or at its worst is 2/10. Pain scale with pain medications or at its best is 0/10. The patientis allergic to nsaids. Past Medical History  Renita Hays  has a past medical history of Arthritis, CAD (coronary artery disease), Depression, Diabetes mellitus ([address] GERD (gastroesophageal reflux disease), Hyperlipidemia, and Thyroid disease.     Past Surgical History The patient  has a past surgical history that includes Cholecystectomy; Carpal tunnel release (Right, 1970); Tooth Extraction (1970); Rotator cuff repair (Left, 1998); Retinopathy surgery (Bilateral, 1997); debridement (Left, 12/2003); cardiovascular stress test (08/2015); Colonoscopy; Upper gastrointestinal endoscopy (2012); Nerve Block Lumb Facet Level 1 Bilateral (Bilateral, 11/27/2017); Nerve Surgery (Bilateral, 02/06/2018); pr inj dx/ther agnt paravert facet joint, lumbar/sac, 1st level (Bilateral, 2/6/2018); other surgical history (Right, 03/05/2018); pr nervous system surgery unlisted (Right, 3/5/2018); other surgical history (Left, 03/27/2018); pr nervous system surgery unlisted (Left, 3/27/2018); eye surgery (2005); back surgery (04/1991); Cardiac catheterization (12/2005); Cardiac catheterization (07/11/2018); pr njx dx/ther sbst intrlmnr lmbr/sac w/img gdn (N/A, 7/19/2018); pr office/outpt visit,procedure only (N/A, 10/12/2018); and IMPLANTATION VAGAL NERVE STIMULATOR (N/A, 12/7/2018). Family History  This patient's family history includes Breast Cancer in his brother; Cancer in his father; Colon Cancer in his father; Diabetes in his brother, father, and paternal grandmother; Heart Disease in his brother, brother, and brother; High Blood Pressure in his mother; Stroke in his mother. Social History  Tomer Esparza  reports that he has been smoking pipe and cigars. He has never used smokeless tobacco. He reports that he does not drink alcohol or use drugs.     Medications    Current Outpatient Medications:     citalopram (CELEXA) 20 MG tablet, Take 1 tablet by mouth daily, Disp: 30 tablet, Rfl: 3    midodrine (PROAMATINE) 10 MG tablet, Take 1 tablet by mouth 3 times daily (with meals), Disp: 90 tablet, Rfl: 3    clopidogrel (PLAVIX) 75 MG tablet, Take 75 mg by mouth daily, Disp: , Rfl:     tiZANidine (ZANAFLEX) 2 MG tablet, 1-2 tabs PRN  BID, Disp: 120 tablet, Rfl: 2   Misc. Devices (ROLLER WALKER) MISC, 1 each by Does not apply route daily, Disp: 1 each, Rfl: 0    acetaminophen (TYLENOL) 500 MG tablet, Take 500 mg by mouth daily, Disp: , Rfl:     levothyroxine (SYNTHROID) 75 MCG tablet, Take 75 mcg by mouth Daily, Disp: , Rfl:     pioglitazone (ACTOS) 15 MG tablet, Take 15 mg by mouth daily, Disp: , Rfl:     aspirin 81 MG tablet, Take 81 mg by mouth daily, Disp: , Rfl:     metoprolol succinate (TOPROL XL) 100 MG extended release tablet, Take 100 mg by mouth daily, Disp: , Rfl:     lansoprazole (PREVACID) 30 MG delayed release capsule, Take 30 mg by mouth daily, Disp: , Rfl:     ferrous sulfate 325 (65 Fe) MG tablet, Take 325 mg by mouth daily (with breakfast), Disp: , Rfl:     Cholecalciferol (VITAMIN D3) 5000 units TABS, Take 5,000 Units by mouth daily, Disp: , Rfl:     insulin NPH (HUMULIN N;NOVOLIN N) 100 UNIT/ML injection, Inject 6-8 Units into the skin 2 times daily (before meals) , Disp: , Rfl:     insulin regular (HUMULIN R;NOVOLIN R) 100 UNIT/ML injection, Inject 5 Units into the skin 2 times daily (before meals) Between 10-12 bid, Disp: , Rfl:     loperamide (IMODIUM) 2 MG capsule, Take 2 mg by mouth as needed. , Disp: , Rfl:     Multiple Vitamins-Minerals (MULTIVITAMIN & MINERAL PO), Take  by mouth daily. , Disp: , Rfl:     Subjective:      Review of Systems   Constitutional: Positive for activity change. Negative for appetite change, chills, diaphoresis, fatigue, fever and unexpected weight change. HENT: Negative for congestion, ear pain, hearing loss, mouth sores, nosebleeds, rhinorrhea, sinus pressure and sore throat. Eyes: Negative for photophobia, pain and visual disturbance. Respiratory: Negative for cough, chest tightness, shortness of breath and wheezing. Cardiovascular: Negative for chest pain and palpitations. CAD   Gastrointestinal: Negative for abdominal pain, constipation, diarrhea, nausea and vomiting.

## 2021-04-26 ENCOUNTER — NURSE ONLY (OUTPATIENT)
Dept: LAB | Age: 81
End: 2021-04-26

## 2021-04-26 LAB
ALBUMIN SERPL-MCNC: 3.8 G/DL (ref 3.5–5.1)
ALP BLD-CCNC: 108 U/L (ref 38–126)
ALT SERPL-CCNC: 13 U/L (ref 11–66)
ANION GAP SERPL CALCULATED.3IONS-SCNC: 14 MEQ/L (ref 8–16)
AST SERPL-CCNC: 14 U/L (ref 5–40)
BASOPHILS # BLD: 0.8 %
BASOPHILS ABSOLUTE: 0 THOU/MM3 (ref 0–0.1)
BILIRUB SERPL-MCNC: 0.3 MG/DL (ref 0.3–1.2)
BUN BLDV-MCNC: 23 MG/DL (ref 7–22)
CALCIUM SERPL-MCNC: 9.7 MG/DL (ref 8.5–10.5)
CHLORIDE BLD-SCNC: 103 MEQ/L (ref 98–111)
CO2: 24 MEQ/L (ref 23–33)
CREAT SERPL-MCNC: 1 MG/DL (ref 0.4–1.2)
EOSINOPHIL # BLD: 3.4 %
EOSINOPHILS ABSOLUTE: 0.2 THOU/MM3 (ref 0–0.4)
ERYTHROCYTE [DISTWIDTH] IN BLOOD BY AUTOMATED COUNT: 15 % (ref 11.5–14.5)
ERYTHROCYTE [DISTWIDTH] IN BLOOD BY AUTOMATED COUNT: 49.9 FL (ref 35–45)
ESTIMATED AVERAGE GLUCOSE: 303 MG/DL (ref 70–126)
FERRITIN: 142 NG/ML (ref 22–322)
GFR SERPL CREATININE-BSD FRML MDRD: 71 ML/MIN/1.73M2
GLUCOSE BLD-MCNC: 249 MG/DL (ref 70–108)
HBA1C MFR BLD: 12.1 % (ref 4.4–6.4)
HCT VFR BLD CALC: 46.8 % (ref 42–52)
HEMOGLOBIN: 14.3 GM/DL (ref 14–18)
IMMATURE GRANS (ABS): 0.03 THOU/MM3 (ref 0–0.07)
IMMATURE GRANULOCYTES %: 0.6 %
IRON: 88 UG/DL (ref 65–195)
LYMPHOCYTES # BLD: 30.7 %
LYMPHOCYTES ABSOLUTE: 1.6 THOU/MM3 (ref 1–4.8)
MCH RBC QN AUTO: 28 PG (ref 26–33)
MCHC RBC AUTO-ENTMCNC: 30.6 GM/DL (ref 32.2–35.5)
MCV RBC AUTO: 91.6 FL (ref 80–94)
MONOCYTES # BLD: 9.8 %
MONOCYTES ABSOLUTE: 0.5 THOU/MM3 (ref 0.4–1.3)
NUCLEATED RED BLOOD CELLS: 0 /100 WBC
PLATELET # BLD: 226 THOU/MM3 (ref 130–400)
PMV BLD AUTO: 11 FL (ref 9.4–12.4)
POTASSIUM SERPL-SCNC: 4.3 MEQ/L (ref 3.5–5.2)
RBC # BLD: 5.11 MILL/MM3 (ref 4.7–6.1)
SEG NEUTROPHILS: 54.7 %
SEGMENTED NEUTROPHILS ABSOLUTE COUNT: 2.9 THOU/MM3 (ref 1.8–7.7)
SODIUM BLD-SCNC: 141 MEQ/L (ref 135–145)
T4 FREE: 1.23 NG/DL (ref 0.93–1.76)
TOTAL IRON BINDING CAPACITY: 313 UG/DL (ref 171–450)
TOTAL PROTEIN: 6.4 G/DL (ref 6.1–8)
TSH SERPL DL<=0.05 MIU/L-ACNC: 0.89 UIU/ML (ref 0.4–4.2)
WBC # BLD: 5.3 THOU/MM3 (ref 4.8–10.8)

## 2021-04-27 LAB
C-PEPTIDE: 2.9 NG/ML (ref 1.1–4.4)
FOLATE: > 20 NG/ML (ref 4.8–24.2)
VITAMIN B-12: 539 PG/ML (ref 211–911)

## 2021-04-29 LAB — VITAMIN D2 AND D3, TOTAL: NORMAL

## 2021-08-02 ENCOUNTER — TELEPHONE (OUTPATIENT)
Dept: PHYSICAL MEDICINE AND REHAB | Age: 81
End: 2021-08-02

## 2022-04-08 VITALS
WEIGHT: 184 LBS | DIASTOLIC BLOOD PRESSURE: 60 MMHG | HEART RATE: 74 BPM | SYSTOLIC BLOOD PRESSURE: 119 MMHG | BODY MASS INDEX: 29.7 KG/M2

## 2022-04-08 PROBLEM — I25.10 ARTERIOSCLEROSIS OF CORONARY ARTERY: Status: ACTIVE | Noted: 2022-04-08
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
## 2022-04-08 PROBLEM — S42.009A CLOSED FRACTURE OF CLAVICLE: Status: ACTIVE | Noted: 2022-04-08

## 2022-04-08 PROBLEM — N28.1 RENAL CYST: Status: ACTIVE | Noted: 2022-04-08

## 2022-04-08 PROBLEM — R10.9 ABDOMINAL PAIN: Status: ACTIVE | Noted: 2022-04-08

## 2022-04-08 PROBLEM — S22.39XA FRACTURE OF RIB: Status: ACTIVE | Noted: 2022-04-08

## 2022-04-08 PROBLEM — E03.9 HYPOTHYROIDISM: Status: ACTIVE | Noted: 2022-04-08

## 2022-04-08 PROBLEM — K37 APPENDICITIS: Status: ACTIVE | Noted: 2022-04-08

## 2022-04-08 RX ORDER — FLUDROCORTISONE ACETATE 0.1 MG/1
TABLET ORAL
COMMUNITY
Start: 2022-01-05 | End: 2022-08-17

## 2022-04-08 RX ORDER — TRAMADOL HYDROCHLORIDE 50 MG/1
TABLET ORAL
COMMUNITY
Start: 2021-08-02

## 2022-04-08 RX ORDER — LEVOTHYROXINE SODIUM 100 MCG
TABLET ORAL
COMMUNITY
Start: 2022-03-15

## 2022-04-08 RX ORDER — PEN NEEDLE, DIABETIC 31 GX5/16"
NEEDLE, DISPOSABLE MISCELLANEOUS
COMMUNITY
Start: 2022-02-08

## 2022-04-08 RX ORDER — INSULIN LISPRO 100 [IU]/ML
INJECTION, SOLUTION INTRAVENOUS; SUBCUTANEOUS
COMMUNITY
Start: 2022-01-06

## 2022-04-08 RX ORDER — FLUDROCORTISONE ACETATE 0.1 MG/1
TABLET ORAL
COMMUNITY
Start: 2021-02-21 | End: 2022-08-17 | Stop reason: SDUPTHER

## 2022-04-08 RX ORDER — EMPAGLIFLOZIN 25 MG/1
TABLET, FILM COATED ORAL
COMMUNITY
Start: 2022-01-30

## 2022-04-08 RX ORDER — ROSUVASTATIN CALCIUM 10 MG/1
TABLET, COATED ORAL
COMMUNITY
Start: 2022-02-08 | End: 2022-08-17 | Stop reason: SDUPTHER

## 2022-04-08 RX ORDER — PEN NEEDLE, DIABETIC 32GX 5/32"
NEEDLE, DISPOSABLE MISCELLANEOUS
COMMUNITY
Start: 2022-01-03

## 2022-04-08 RX ORDER — DONEPEZIL HYDROCHLORIDE 5 MG/1
TABLET, FILM COATED ORAL
COMMUNITY
Start: 2022-03-03

## 2022-04-08 RX ORDER — NITROGLYCERIN 0.4 MG/1
TABLET SUBLINGUAL
COMMUNITY
Start: 2021-02-21 | End: 2022-08-17 | Stop reason: SDUPTHER

## 2022-04-08 RX ORDER — INSULIN DEGLUDEC INJECTION 100 U/ML
INJECTION, SOLUTION SUBCUTANEOUS
COMMUNITY
Start: 2022-02-21

## 2022-08-03 RX ORDER — MIDODRINE HYDROCHLORIDE 10 MG/1
TABLET ORAL
Qty: 270 TABLET | Refills: 3 | OUTPATIENT
Start: 2022-08-03

## 2022-08-17 ENCOUNTER — OFFICE VISIT (OUTPATIENT)
Dept: CARDIOLOGY CLINIC | Age: 82
End: 2022-08-17
Payer: MEDICARE

## 2022-08-17 VITALS
DIASTOLIC BLOOD PRESSURE: 68 MMHG | SYSTOLIC BLOOD PRESSURE: 136 MMHG | HEIGHT: 67 IN | WEIGHT: 277 LBS | BODY MASS INDEX: 43.47 KG/M2 | RESPIRATION RATE: 20 BRPM | HEART RATE: 92 BPM

## 2022-08-17 DIAGNOSIS — E78.5 DYSLIPIDEMIA, GOAL LDL BELOW 100: ICD-10-CM

## 2022-08-17 DIAGNOSIS — I25.10 ARTERIOSCLEROSIS OF CORONARY ARTERY: Primary | ICD-10-CM

## 2022-08-17 PROCEDURE — 99213 OFFICE O/P EST LOW 20 MIN: CPT | Performed by: INTERNAL MEDICINE

## 2022-08-17 PROCEDURE — 1123F ACP DISCUSS/DSCN MKR DOCD: CPT | Performed by: INTERNAL MEDICINE

## 2022-08-17 PROCEDURE — 93000 ELECTROCARDIOGRAM COMPLETE: CPT | Performed by: INTERNAL MEDICINE

## 2022-08-17 RX ORDER — MIDODRINE HYDROCHLORIDE 10 MG/1
10 TABLET ORAL
Qty: 270 TABLET | Refills: 3 | Status: SHIPPED | OUTPATIENT
Start: 2022-08-17

## 2022-08-17 RX ORDER — SUCRALFATE 1 G/1
1 TABLET ORAL 4 TIMES DAILY
COMMUNITY

## 2022-08-17 RX ORDER — FLUDROCORTISONE ACETATE 0.1 MG/1
TABLET ORAL
Qty: 90 TABLET | Refills: 3 | Status: SHIPPED | OUTPATIENT
Start: 2022-08-17

## 2022-08-17 RX ORDER — CLOPIDOGREL BISULFATE 75 MG/1
75 TABLET ORAL DAILY
Qty: 90 TABLET | Refills: 3 | Status: SHIPPED | OUTPATIENT
Start: 2022-08-17

## 2022-08-17 RX ORDER — METOPROLOL SUCCINATE 100 MG/1
100 TABLET, EXTENDED RELEASE ORAL DAILY
Qty: 90 TABLET | Refills: 3 | Status: SHIPPED | OUTPATIENT
Start: 2022-08-17

## 2022-08-17 RX ORDER — NITROGLYCERIN 0.4 MG/1
TABLET SUBLINGUAL
Qty: 25 TABLET | Refills: 1 | Status: SHIPPED | OUTPATIENT
Start: 2022-08-17

## 2022-08-17 RX ORDER — ROSUVASTATIN CALCIUM 10 MG/1
TABLET, COATED ORAL
Qty: 90 TABLET | Refills: 3 | Status: SHIPPED | OUTPATIENT
Start: 2022-08-17

## 2022-08-17 RX ORDER — RIVASTIGMINE TARTRATE 1.5 MG/1
1.5 CAPSULE ORAL 2 TIMES DAILY
COMMUNITY

## 2022-08-17 RX ORDER — DIPHENHYDRAMINE HCL 25 MG
25 TABLET ORAL NIGHTLY
COMMUNITY

## 2022-08-17 ASSESSMENT — ENCOUNTER SYMPTOMS
STRIDOR: 0
BLOOD IN STOOL: 0
VOICE CHANGE: 0
WHEEZING: 0
COLOR CHANGE: 0
COUGH: 0
ABDOMINAL DISTENTION: 0
CHOKING: 0
ANAL BLEEDING: 0
TROUBLE SWALLOWING: 0
APNEA: 0
NAUSEA: 0
CHEST TIGHTNESS: 0
ABDOMINAL PAIN: 0
VOMITING: 0
SHORTNESS OF BREATH: 0

## 2022-08-17 NOTE — PROGRESS NOTES
[address]  Dept: [address]  Loc: [phone]     8/17/2022       Xena Stiles is here today for   Chief Complaint   Patient presents with    Follow-up           Referring Physician:  No ref. 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 HENT:  Negative for congestion, trouble swallowing and voice change. Eyes:  Negative for visual disturbance. Respiratory:  Negative for apnea, cough, choking, chest tightness, shortness of breath, wheezing and stridor. Cardiovascular:  Negative for chest pain, palpitations and leg swelling. Gastrointestinal:  Negative for abdominal distention, abdominal pain, anal bleeding, blood in stool, nausea and vomiting. Endocrine: Negative for cold intolerance and heat intolerance. Genitourinary:  Negative for hematuria. Musculoskeletal:  Negative for arthralgias, gait problem, joint swelling and myalgias. Skin:  Negative for color change and rash. Allergic/Immunologic: Negative for environmental allergies and food allergies.    Neurological:  Negative for dizziness, tremors, syncope, facial asymmetry, weakness, light-headedness, numbness and headaches. Hematological:  Does not bruise/bleed easily. Psychiatric/Behavioral:  Negative for agitation, behavioral problems and sleep disturbance. Past Medical History:   Diagnosis Date    Arthritis     CAD (coronary artery disease)     Depression     Diabetes mellitus (HCC)     GERD (gastroesophageal reflux disease)     Hyperlipidemia     Thyroid disease        Allergies   Allergen Reactions    Nsaids Other (See Comments) and Nausea And Vomiting     GI bleed with large doses; does take small doses regularly     Sulfa Antibiotics Other (See Comments)       Current Outpatient Medications   Medication Sig Dispense Refill    rivastigmine (EXELON) 1.5 MG capsule Take 1.5 mg by mouth 2 times daily      sucralfate (CARAFATE) 1 GM tablet Take 1 g by mouth 4 times daily      diphenhydrAMINE (BENADRYL) 25 MG tablet Take 25 mg by mouth at bedtime      midodrine (PROAMATINE) 10 MG tablet Take 1 tablet by mouth 3 times daily (with meals) 270 tablet 3    clopidogrel (PLAVIX) 75 MG tablet Take 1 tablet by mouth daily 90 tablet 3    rosuvastatin (CRESTOR) 10 MG tablet TAKE 1 TABLET BY MOUTH EVERY DAY 90 tablet 3    metoprolol succinate (TOPROL XL) 100 MG extended release tablet Take 1 tablet by mouth daily 90 tablet 3    nitroGLYCERIN (NITROSTAT) 0.4 MG SL tablet Nitroglycerin Active 0.4 MG BU . [id number] February 21st, 2021 10:58pm 25 tablet 1    fludrocortisone (FLORINEF) 0.1 MG tablet Fludrocortisone Active 0.1 MG PO Daily February 21st, 2021 10:53pm 90 tablet 3    JARDIANCE 25 MG tablet       empagliflozin (JARDIANCE) 25 MG tablet Empagliflozin (Jardiance) 25 mg tablet Active 25 MG PO Daily August 2nd, 2021 2:19am      donepezil (ARICEPT) 5 MG tablet       Multiple Vitamin (MULTIVITAMIN PO) Mv-Mins-Folic-Lycopene-Ginkgo Active 1 TAB PO Daily February 21st, 2021 10:40pm      Multiple Vitamin (MULTIVITAMIN PO) Mv-Mins-Folic-Lycopene-Ginkgo (Daily Multiple For Men 50+) 400-600-120 mcg-mcg-mg Tablet Active 1 TAB PO Daily February 21st, 2021 9:40pm      HUMALOG KWIKPEN 100 UNIT/ML SOPN       TRESIBA FLEXTOUCH 100 UNIT/ML SOPN       Insulin Degludec 100 UNIT/ML SOPN Insulin Degludec Yamilex Golas Flextouch U-100) 100 unit/mL (3 mL) insulin pen Active 24 UNITS SC Daily August 2nd, 2021 1:19am      SYNTHROID 100 MCG tablet       B-D UF III MINI PEN NEEDLES 31G X 5 MM MISC       BD PEN NEEDLE MATTHEW 2ND GEN 32G X 4 MM MISC USE AS DIRECTED THREE TIMES DAILY      traMADol (ULTRAM) 50 MG tablet Tramadol Active 50 MG PO Q6H 14 5 August 2nd, 2021 3:56am      citalopram (CELEXA) 20 MG tablet Take 1 tablet by mouth daily 30 tablet 3    tiZANidine (ZANAFLEX) 2 MG tablet 1-2 tabs PRN   tablet 2    acetaminophen (TYLENOL) 500 MG tablet Take 500 mg by mouth daily      levothyroxine (SYNTHROID) 75 MCG tablet Take 75 mcg by mouth Daily      pioglitazone (ACTOS) 15 MG tablet Take 15 mg by mouth daily      aspirin 81 MG tablet Take 81 mg by mouth daily      lansoprazole (PREVACID) 30 MG delayed release capsule Take 30 mg by mouth daily      ferrous sulfate 325 (65 Fe) MG tablet Take 325 mg by mouth daily (with breakfast)      Cholecalciferol (VITAMIN D3) 5000 units TABS Take 5,000 Units by mouth daily      insulin NPH (HUMULIN N;NOVOLIN N) 100 UNIT/ML injection Inject 6-8 Units into the skin 2 times daily (before meals)       insulin regular (HUMULIN R;NOVOLIN R) 100 UNIT/ML injection Inject 5 Units into the skin 2 times daily (before meals) Between 10-12 bid      loperamide (IMODIUM) 2 MG capsule Take 2 mg by mouth as needed. Multiple Vitamins-Minerals (MULTIVITAMIN & MINERAL PO) Take  by mouth daily. Misc. Devices (ROLLER WALKER) MISC 1 each by Does not apply route daily 1 each 0     No current facility-administered medications for this visit.        Social History     Socioeconomic History    Marital status:      Spouse name: None    Number of children: None    Years of education: None    Highest education level: None   Tobacco Use    Smoking status: Every Day     Types: Pipe, Cigars    Smokeless tobacco: Never   Vaping Use    Vaping Use: Never used   Substance and Sexual Activity    Alcohol use: No    Drug use: No       Family History   Problem Relation Age of Onset    High Blood Pressure Mother     Stroke Mother     Cancer Father     Colon Cancer Father     Diabetes Father     Breast Cancer Brother         esophageal    Diabetes Brother     Heart Disease Brother     Diabetes Paternal Grandmother     Heart Disease Brother     Heart Disease Brother        Blood pressure 136/68, pulse 92, resp. rate 20, height 5' 7\" (1.702 m), weight 277 lb (125.6 kg). Physical Exam:    General Appearance: alert and oriented to person, place and time, in no acute distress  Cardiovascular: normal rate, regular rhythm, normal S1 and S2, no murmurs, rubs, clicks, or gallops, distal pulses intact, no carotid bruits, no JVD  Pulmonary/Chest: clear to auscultation bilaterally- no wheezes, rales or rhonchi, normal air movement, no respiratory distress  Abdomen: soft, non-tender, non-distended, normal bowel sounds, no masses   Extremities: no cyanosis, clubbing or edema, pulse   Skin: warm and dry, no rash or erythema  Head: normocephalic and atraumatic  Eyes: pupils equal, round, and reactive to light  Neck: supple and non-tender without mass, no thyromegaly   Musculoskeletal: normal range of motion, no joint swelling, deformity or tenderness  Neurological: alert, oriented, normal speech, no focal findings or movement disorder noted    Lab Data:    Cardiac Enzymes:  No results for input(s): CKTOTAL, CKMB, CKMBINDEX, TROPONINI in the last 72 hours.     CBC:   Lab Results   Component Value Date/Time    WBC 6.7 08/10/2022 09:48 AM    RBC 4.81 08/10/2022 09:48 AM    RBC 0-2 10/01/2011 03:05 AM    HGB 14.0 08/10/2022 09:48 AM    HCT 44.6 08/10/2022 09:48 AM     08/10/2022 09:48 AM       CMP:    Lab Results Component Value Date/Time     08/10/2022 09:48 AM    K 3.8 08/10/2022 09:48 AM     08/10/2022 09:48 AM    CO2 28 08/10/2022 09:48 AM    BUN 15 08/10/2022 09:48 AM    CREATININE 0.7 08/10/2022 09:48 AM    AGRATIO 1.5 02/28/2020 11:04 AM    LABGLOM >90 08/10/2022 09:48 AM    GLUCOSE 140 08/10/2022 09:48 AM    GLUCOSE 127 08/18/2020 08:58 AM    CALCIUM 9.5 08/10/2022 09:48 AM       Hepatic Function Panel:    Lab Results   Component Value Date/Time    ALKPHOS 146 08/10/2022 09:48 AM    ALT 12 08/10/2022 09:48 AM    AST 14 08/10/2022 09:48 AM    PROT 6.5 08/10/2022 09:48 AM    BILITOT 0.5 08/10/2022 09:48 AM    BILIDIR <0.2 08/10/2022 09:48 AM    LABALBU 4.1 08/10/2022 09:48 AM    LABALBU 4.2 05/12/2012 09:36 AM       Magnesium:  No results found for: MG    PT/INR:    Lab Results   Component Value Date/Time    INR 0.87 11/30/2018 11:27 AM       HgBA1c:    Lab Results   Component Value Date/Time    LABA1C 10.4 08/13/2021 10:18 AM       FLP:    Lab Results   Component Value Date/Time    TRIG 190 08/10/2022 09:48 AM    HDL 51 08/10/2022 09:48 AM    LDLCALC 61 08/10/2022 09:48 AM    LDLDIRECT 44 08/18/2020 08:58 AM       TSH:    Lab Results   Component Value Date/Time    TSH 0.888 04/26/2021 11:42 AM        Diagnosis Orders   1. Arteriosclerosis of coronary artery  [address]366 - [place] ELECTROCARDIOGRAM, COMPLETE    CBC    Basic Metabolic Panel    Lipid Panel    Hepatic Function Panel      2. Dyslipidemia, goal LDL below 100  CBC    Basic Metabolic Panel    Lipid Panel    Hepatic Function Panel           Assessment/Plan there is an 78years old gentleman with a known history of coronary artery disease he has a history of prior intervention in the past.  This patient is here for a follow-up. The patient had history of gastroesophageal reflux. The patient good dyspnea on exertion    He does have a history of diabetes mellitus wife indicate that he is getting his dementia progressed badly.   There is history of

## 2022-10-11 RX ORDER — CITALOPRAM 20 MG/1
TABLET ORAL
Qty: 90 TABLET | Refills: 3 | Status: SHIPPED | OUTPATIENT
Start: 2022-10-11

## 2022-10-11 NOTE — TELEPHONE ENCOUNTER
CITALOPRAM 20 MG DAILY    NEXT APPOINTMENT 08/21/2023  LAST APPOINTMENT  08/17/2022    LABS 08/10/2022

## 2023-07-07 RX ORDER — ROSUVASTATIN CALCIUM 10 MG/1
TABLET, COATED ORAL
Qty: 90 TABLET | Refills: 0 | Status: SHIPPED | OUTPATIENT
Start: 2023-07-07

## 2023-07-07 RX ORDER — CITALOPRAM 20 MG/1
TABLET ORAL
Qty: 90 TABLET | Refills: 0 | Status: SHIPPED | OUTPATIENT
Start: 2023-07-07

## 2023-07-11 RX ORDER — ROSUVASTATIN CALCIUM 10 MG/1
TABLET, COATED ORAL
Qty: 90 TABLET | Refills: 3 | OUTPATIENT
Start: 2023-07-11

## 2023-07-11 RX ORDER — CITALOPRAM 20 MG/1
TABLET ORAL
Qty: 90 TABLET | Refills: 3 | OUTPATIENT
Start: 2023-07-11

## 2023-07-13 RX ORDER — CLOPIDOGREL BISULFATE 75 MG/1
TABLET ORAL
Qty: 90 TABLET | Refills: 0 | Status: SHIPPED | OUTPATIENT
Start: 2023-07-13

## 2023-07-17 RX ORDER — FLUDROCORTISONE ACETATE 0.1 MG/1
TABLET ORAL
Qty: 90 TABLET | Refills: 0 | Status: SHIPPED | OUTPATIENT
Start: 2023-07-17

## 2023-08-07 ENCOUNTER — NURSE ONLY (OUTPATIENT)
Dept: LAB | Age: 83
End: 2023-08-07

## 2023-08-07 DIAGNOSIS — I25.10 ARTERIOSCLEROSIS OF CORONARY ARTERY: ICD-10-CM

## 2023-08-07 DIAGNOSIS — E78.5 DYSLIPIDEMIA, GOAL LDL BELOW 100: ICD-10-CM

## 2023-08-07 LAB
ALBUMIN SERPL BCG-MCNC: 4.1 G/DL (ref 3.5–5.1)
ALP SERPL-CCNC: 97 U/L (ref 38–126)
ALT SERPL W/O P-5'-P-CCNC: 8 U/L (ref 11–66)
ANION GAP SERPL CALC-SCNC: 14 MEQ/L (ref 8–16)
AST SERPL-CCNC: 12 U/L (ref 5–40)
BILIRUB CONJ SERPL-MCNC: < 0.2 MG/DL (ref 0–0.3)
BILIRUB SERPL-MCNC: 0.7 MG/DL (ref 0.3–1.2)
BUN SERPL-MCNC: 23 MG/DL (ref 7–22)
CALCIUM SERPL-MCNC: 9.4 MG/DL (ref 8.5–10.5)
CHLORIDE SERPL-SCNC: 104 MEQ/L (ref 98–111)
CHOLEST SERPL-MCNC: 146 MG/DL (ref 100–199)
CO2 SERPL-SCNC: 26 MEQ/L (ref 23–33)
CREAT SERPL-MCNC: 0.8 MG/DL (ref 0.4–1.2)
DEPRECATED RDW RBC AUTO: 48.1 FL (ref 35–45)
ERYTHROCYTE [DISTWIDTH] IN BLOOD BY AUTOMATED COUNT: 14.3 % (ref 11.5–14.5)
GFR SERPL CREATININE-BSD FRML MDRD: > 60 ML/MIN/1.73M2
GLUCOSE SERPL-MCNC: 140 MG/DL (ref 70–108)
HCT VFR BLD AUTO: 46.3 % (ref 42–52)
HDLC SERPL-MCNC: 52 MG/DL
HGB BLD-MCNC: 14.4 GM/DL (ref 14–18)
LDLC SERPL CALC-MCNC: 66 MG/DL
MCH RBC QN AUTO: 28.6 PG (ref 26–33)
MCHC RBC AUTO-ENTMCNC: 31.1 GM/DL (ref 32.2–35.5)
MCV RBC AUTO: 92 FL (ref 80–94)
PLATELET # BLD AUTO: 220 THOU/MM3 (ref 130–400)
PMV BLD AUTO: 10.5 FL (ref 9.4–12.4)
POTASSIUM SERPL-SCNC: 4.5 MEQ/L (ref 3.5–5.2)
PROT SERPL-MCNC: 6.9 G/DL (ref 6.1–8)
RBC # BLD AUTO: 5.03 MILL/MM3 (ref 4.7–6.1)
SODIUM SERPL-SCNC: 144 MEQ/L (ref 135–145)
TRIGL SERPL-MCNC: 138 MG/DL (ref 0–199)
WBC # BLD AUTO: 5.7 THOU/MM3 (ref 4.8–10.8)

## 2023-08-18 NOTE — PROGRESS NOTES
[address] [facility] [address]  Dept: [address]: [phone]           Chief Complaint   Patient presents with    Coronary Artery Disease    Follow-up       Cardiologist:  Dr. Patino Senior      Today's visit: 8/21/2023    Subjective:    Review of Systems   Constitutional: Negative. Respiratory: Negative. Cardiovascular: Negative. Gastrointestinal: Negative. Musculoskeletal: Negative. Neurological: Negative. Psychiatric/Behavioral: Negative. Past Medical History:   Diagnosis Date    Arthritis     CAD (coronary artery disease)     Depression     Diabetes mellitus (HCC)     GERD (gastroesophageal reflux disease)     Hyperlipidemia     Thyroid disease        Allergies   Allergen Reactions    Nsaids Other (See Comments) and Nausea And Vomiting     GI bleed with large doses; does take small doses regularly     Sulfa Antibiotics Other (See Comments)       Current Outpatient Medications   Medication Sig Dispense Refill    fludrocortisone (FLORINEF) 0.1 MG tablet TAKE 1 TABLET DAILY 90 tablet 0    clopidogrel (PLAVIX) 75 MG tablet Take 1 tablet by mouth daily 90 tablet 0    rosuvastatin (CRESTOR) 10 MG tablet TAKE 1 TABLET DAILY 90 tablet 0    citalopram (CELEXA) 20 MG tablet Take 1 tablet by mouth daily 90 tablet 0    midodrine (PROAMATINE) 10 MG tablet Take 1 tablet by mouth 3 times daily (with meals) 270 tablet 3    nitroGLYCERIN (NITROSTAT) 0.4 MG SL tablet Nitroglycerin Active 0.4 MG BU . [facility] February 21st, 2021 10:58pm 25 tablet 1    metoprolol succinate (TOPROL XL) 100 MG extended release tablet Take 1 tablet by mouth daily 90 tablet 3    rivastigmine (EXELON) 1.5 MG capsule Take 1 capsule by mouth 2 times daily      sucralfate (CARAFATE) 1 GM tablet Take 1 tablet by mouth 4 times daily      diphenhydrAMINE (BENADRYL) 25 MG tablet Take 1 tablet by mouth at bedtime

## 2023-08-21 ENCOUNTER — OFFICE VISIT (OUTPATIENT)
Dept: CARDIOLOGY CLINIC | Age: 83
End: 2023-08-21
Payer: MEDICARE

## 2023-08-21 VITALS
WEIGHT: 194.6 LBS | BODY MASS INDEX: 30.54 KG/M2 | TEMPERATURE: 97.6 F | RESPIRATION RATE: 16 BRPM | HEART RATE: 74 BPM | HEIGHT: 67 IN | SYSTOLIC BLOOD PRESSURE: 126 MMHG | DIASTOLIC BLOOD PRESSURE: 72 MMHG | OXYGEN SATURATION: 98 %

## 2023-08-21 DIAGNOSIS — E78.5 HYPERLIPIDEMIA LDL GOAL <70: ICD-10-CM

## 2023-08-21 DIAGNOSIS — I10 PRIMARY HYPERTENSION: ICD-10-CM

## 2023-08-21 DIAGNOSIS — I25.10 CORONARY ARTERY DISEASE INVOLVING NATIVE CORONARY ARTERY OF NATIVE HEART WITHOUT ANGINA PECTORIS: Primary | ICD-10-CM

## 2023-08-21 PROCEDURE — 3074F SYST BP LT 130 MM HG: CPT | Performed by: NURSE PRACTITIONER

## 2023-08-21 PROCEDURE — 1123F ACP DISCUSS/DSCN MKR DOCD: CPT | Performed by: NURSE PRACTITIONER

## 2023-08-21 PROCEDURE — 3078F DIAST BP <80 MM HG: CPT | Performed by: NURSE PRACTITIONER

## 2023-08-21 PROCEDURE — G8417 CALC BMI ABV UP PARAM F/U: HCPCS | Performed by: NURSE PRACTITIONER

## 2023-08-21 PROCEDURE — 93000 ELECTROCARDIOGRAM COMPLETE: CPT | Performed by: INTERNAL MEDICINE

## 2023-08-21 PROCEDURE — G8427 DOCREV CUR MEDS BY ELIG CLIN: HCPCS | Performed by: NURSE PRACTITIONER

## 2023-08-21 PROCEDURE — 99214 OFFICE O/P EST MOD 30 MIN: CPT | Performed by: NURSE PRACTITIONER

## 2023-08-21 PROCEDURE — 4004F PT TOBACCO SCREEN RCVD TLK: CPT | Performed by: NURSE PRACTITIONER

## 2023-08-21 RX ORDER — MIDODRINE HYDROCHLORIDE 10 MG/1
10 TABLET ORAL
Qty: 270 TABLET | Refills: 3 | Status: SHIPPED | OUTPATIENT
Start: 2023-08-21

## 2023-08-21 RX ORDER — METOPROLOL SUCCINATE 100 MG/1
100 TABLET, EXTENDED RELEASE ORAL DAILY
Qty: 90 TABLET | Refills: 3 | Status: SHIPPED | OUTPATIENT
Start: 2023-08-21

## 2023-08-21 RX ORDER — FLUDROCORTISONE ACETATE 0.1 MG/1
TABLET ORAL
Qty: 90 TABLET | Refills: 0 | Status: SHIPPED | OUTPATIENT
Start: 2023-08-21

## 2023-08-21 RX ORDER — ROSUVASTATIN CALCIUM 10 MG/1
TABLET, COATED ORAL
Qty: 90 TABLET | Refills: 0 | Status: SHIPPED | OUTPATIENT
Start: 2023-08-21

## 2023-08-21 RX ORDER — CITALOPRAM 20 MG/1
20 TABLET ORAL DAILY
Qty: 90 TABLET | Refills: 0 | Status: SHIPPED | OUTPATIENT
Start: 2023-08-21

## 2023-08-21 RX ORDER — NITROGLYCERIN 0.4 MG/1
TABLET SUBLINGUAL
Qty: 25 TABLET | Refills: 1 | Status: SHIPPED | OUTPATIENT
Start: 2023-08-21

## 2023-08-21 RX ORDER — CLOPIDOGREL BISULFATE 75 MG/1
75 TABLET ORAL DAILY
Qty: 90 TABLET | Refills: 0 | Status: SHIPPED | OUTPATIENT
Start: 2023-08-21

## 2023-08-21 ASSESSMENT — ENCOUNTER SYMPTOMS
RESPIRATORY NEGATIVE: 1
GASTROINTESTINAL NEGATIVE: 1

## 2023-10-27 RX ORDER — FLUDROCORTISONE ACETATE 0.1 MG/1
TABLET ORAL
Qty: 90 TABLET | Refills: 3 | Status: SHIPPED | OUTPATIENT
Start: 2023-10-27

## 2023-12-26 DIAGNOSIS — I10 PRIMARY HYPERTENSION: ICD-10-CM

## 2023-12-26 DIAGNOSIS — E78.5 HYPERLIPIDEMIA LDL GOAL <70: ICD-10-CM

## 2023-12-29 RX ORDER — CLOPIDOGREL BISULFATE 75 MG/1
75 TABLET ORAL DAILY
Qty: 90 TABLET | Refills: 3 | Status: SHIPPED | OUTPATIENT
Start: 2023-12-29

## 2024-05-14 VITALS
DIASTOLIC BLOOD PRESSURE: 87 MMHG | BODY MASS INDEX: 29.01 KG/M2 | HEART RATE: 99 BPM | SYSTOLIC BLOOD PRESSURE: 138 MMHG | OXYGEN SATURATION: 94 % | RESPIRATION RATE: 18 BRPM | WEIGHT: 185.2 LBS | TEMPERATURE: 98.6 F

## 2024-05-14 NOTE — PROGRESS NOTES
and Nausea And Vomiting     GI bleed with large doses; does take small doses regularly     Sulfa Antibiotics Other (See Comments)       Health Maintenance   Topic Date Due    Depression Screen  Never done    DTaP/Tdap/Td vaccine (1 - Tdap) Never done    Respiratory Syncytial Virus (RSV) Pregnant or age 60 yrs+ (1 - 1-dose 60+ series) Never done    Pneumococcal 65+ years Vaccine (2 of 2 - PPSV23 or PCV20) 01/10/2013    Shingles vaccine (2 of 3) 12/09/2015    Annual Wellness Visit (Medicare Advantage)  01/01/2024    Lipids  08/07/2024    Flu vaccine  Completed    COVID-19 Vaccine  Completed    Hepatitis A vaccine  Aged Out    Hepatitis B vaccine  Aged Out    Hib vaccine  Aged Out    Polio vaccine  Aged Out    Meningococcal (ACWY) vaccine  Aged Out       Subjective:      Review of Systems   Reason unable to perform ROS: sleeping.       Objective:     Physical Exam  Vitals and nursing note reviewed.   Constitutional:       General: He is not in acute distress.     Appearance: He is ill-appearing (chronic). He is not diaphoretic.   Eyes:      Conjunctiva/sclera:      Right eye: Right conjunctiva is not injected.      Left eye: Left conjunctiva is not injected.      Pupils: Pupils are equal.   Cardiovascular:      Rate and Rhythm: Normal rate and regular rhythm.      Heart sounds: No murmur heard.  Pulmonary:      Effort: Pulmonary effort is normal. No respiratory distress.      Breath sounds: Normal breath sounds.   Abdominal:      General: Bowel sounds are normal.      Palpations: Abdomen is soft.      Tenderness: There is no abdominal tenderness. There is no guarding or rebound.   Musculoskeletal:      Cervical back: Normal range of motion.   Skin:     General: Skin is warm.      Findings: No rash.   Neurological:      Mental Status: He is unresponsive.   Psychiatric:         Speech: He is noncommunicative.       /87   Pulse 99   Temp 98.6 °F (37 °C)   Resp 18   Wt 84 kg (185 lb 3.2 oz)   SpO2 94%   BMI

## 2024-05-15 ENCOUNTER — APPOINTMENT (OUTPATIENT)
Dept: GENERAL RADIOLOGY | Age: 84
End: 2024-05-15
Payer: MEDICARE

## 2024-05-15 ENCOUNTER — HOSPITAL ENCOUNTER (INPATIENT)
Age: 84
LOS: 8 days | Discharge: NURSING, REHAB OR LONG-TERM CARE | End: 2024-05-24
Attending: EMERGENCY MEDICINE
Payer: MEDICARE

## 2024-05-15 ENCOUNTER — OUTSIDE SERVICES (OUTPATIENT)
Dept: FAMILY MEDICINE CLINIC | Age: 84
End: 2024-05-15
Payer: MEDICARE

## 2024-05-15 DIAGNOSIS — Z79.4 TYPE 2 DIABETES MELLITUS WITHOUT COMPLICATION, WITH LONG-TERM CURRENT USE OF INSULIN (HCC): ICD-10-CM

## 2024-05-15 DIAGNOSIS — F03.90 DEMENTIA, UNSPECIFIED DEMENTIA SEVERITY, UNSPECIFIED DEMENTIA TYPE, UNSPECIFIED WHETHER BEHAVIORAL, PSYCHOTIC, OR MOOD DISTURBANCE OR ANXIETY (HCC): ICD-10-CM

## 2024-05-15 DIAGNOSIS — E11.9 TYPE 2 DIABETES MELLITUS WITHOUT COMPLICATION, WITH LONG-TERM CURRENT USE OF INSULIN (HCC): ICD-10-CM

## 2024-05-15 DIAGNOSIS — D64.9 ANEMIA, UNSPECIFIED TYPE: Primary | ICD-10-CM

## 2024-05-15 DIAGNOSIS — I25.10 ASHD (ARTERIOSCLEROTIC HEART DISEASE): ICD-10-CM

## 2024-05-15 DIAGNOSIS — G93.41 ACUTE METABOLIC ENCEPHALOPATHY: ICD-10-CM

## 2024-05-15 DIAGNOSIS — I10 PRIMARY HYPERTENSION: ICD-10-CM

## 2024-05-15 DIAGNOSIS — R07.9 CHEST PAIN, UNSPECIFIED TYPE: Primary | ICD-10-CM

## 2024-05-15 DIAGNOSIS — E03.9 HYPOTHYROIDISM, UNSPECIFIED TYPE: ICD-10-CM

## 2024-05-15 DIAGNOSIS — K21.00 GASTROESOPHAGEAL REFLUX DISEASE WITH ESOPHAGITIS WITHOUT HEMORRHAGE: ICD-10-CM

## 2024-05-15 LAB
ANION GAP SERPL CALC-SCNC: 12 MEQ/L (ref 8–16)
BASOPHILS ABSOLUTE: 0 THOU/MM3 (ref 0–0.1)
BASOPHILS NFR BLD AUTO: 0.4 %
BUN SERPL-MCNC: 20 MG/DL (ref 7–22)
CALCIUM SERPL-MCNC: 9 MG/DL (ref 8.5–10.5)
CHLORIDE SERPL-SCNC: 107 MEQ/L (ref 98–111)
CO2 SERPL-SCNC: 24 MEQ/L (ref 23–33)
CREAT SERPL-MCNC: 1.1 MG/DL (ref 0.4–1.2)
DEPRECATED RDW RBC AUTO: 49.7 FL (ref 35–45)
EKG ATRIAL RATE: 109 BPM
EKG P AXIS: 78 DEGREES
EKG P-R INTERVAL: 152 MS
EKG Q-T INTERVAL: 382 MS
EKG QRS DURATION: 86 MS
EKG QTC CALCULATION (BAZETT): 514 MS
EKG R AXIS: -52 DEGREES
EKG T AXIS: 81 DEGREES
EKG VENTRICULAR RATE: 109 BPM
EOSINOPHIL NFR BLD AUTO: 1.8 %
EOSINOPHILS ABSOLUTE: 0.1 THOU/MM3 (ref 0–0.4)
ERYTHROCYTE [DISTWIDTH] IN BLOOD BY AUTOMATED COUNT: 14.6 % (ref 11.5–14.5)
GFR SERPL CREATININE-BSD FRML MDRD: 65 ML/MIN/1.73M2
GLUCOSE SERPL-MCNC: 258 MG/DL (ref 70–108)
HCT VFR BLD AUTO: 40.3 % (ref 42–52)
HGB BLD-MCNC: 12.9 GM/DL (ref 14–18)
IMM GRANULOCYTES # BLD AUTO: 0.04 THOU/MM3 (ref 0–0.07)
IMM GRANULOCYTES NFR BLD AUTO: 0.5 %
LYMPHOCYTES ABSOLUTE: 1 THOU/MM3 (ref 1–4.8)
LYMPHOCYTES NFR BLD AUTO: 12.5 %
MCH RBC QN AUTO: 29.9 PG (ref 26–33)
MCHC RBC AUTO-ENTMCNC: 32 GM/DL (ref 32.2–35.5)
MCV RBC AUTO: 93.3 FL (ref 80–94)
MONOCYTES ABSOLUTE: 0.8 THOU/MM3 (ref 0.4–1.3)
MONOCYTES NFR BLD AUTO: 9.8 %
NEUTROPHILS ABSOLUTE: 5.8 THOU/MM3 (ref 1.8–7.7)
NEUTROPHILS NFR BLD AUTO: 75 %
NRBC BLD AUTO-RTO: 0 /100 WBC
OSMOLALITY SERPL CALC.SUM OF ELEC: 296.5 MOSMOL/KG (ref 275–300)
PLATELET # BLD AUTO: 163 THOU/MM3 (ref 130–400)
PMV BLD AUTO: 11.8 FL (ref 9.4–12.4)
POTASSIUM SERPL-SCNC: 4.6 MEQ/L (ref 3.5–5.2)
RBC # BLD AUTO: 4.32 MILL/MM3 (ref 4.7–6.1)
SODIUM SERPL-SCNC: 143 MEQ/L (ref 135–145)
TROPONIN, HIGH SENSITIVITY: 34 NG/L (ref 0–12)
TROPONIN, HIGH SENSITIVITY: 41 NG/L (ref 0–12)
WBC # BLD AUTO: 7.7 THOU/MM3 (ref 4.8–10.8)

## 2024-05-15 PROCEDURE — 93005 ELECTROCARDIOGRAM TRACING: CPT | Performed by: EMERGENCY MEDICINE

## 2024-05-15 PROCEDURE — 36415 COLL VENOUS BLD VENIPUNCTURE: CPT

## 2024-05-15 PROCEDURE — 99306 1ST NF CARE HIGH MDM 50: CPT | Performed by: FAMILY MEDICINE

## 2024-05-15 PROCEDURE — 80048 BASIC METABOLIC PNL TOTAL CA: CPT

## 2024-05-15 PROCEDURE — 93010 ELECTROCARDIOGRAM REPORT: CPT | Performed by: INTERNAL MEDICINE

## 2024-05-15 PROCEDURE — 85025 COMPLETE CBC W/AUTO DIFF WBC: CPT

## 2024-05-15 PROCEDURE — 84484 ASSAY OF TROPONIN QUANT: CPT

## 2024-05-15 PROCEDURE — 71045 X-RAY EXAM CHEST 1 VIEW: CPT

## 2024-05-15 PROCEDURE — 99285 EMERGENCY DEPT VISIT HI MDM: CPT

## 2024-05-15 ASSESSMENT — PAIN SCALES - GENERAL: PAINLEVEL_OUTOF10: 8

## 2024-05-15 ASSESSMENT — PAIN - FUNCTIONAL ASSESSMENT: PAIN_FUNCTIONAL_ASSESSMENT: 0-10

## 2024-05-16 PROBLEM — R79.89 ELEVATED TROPONIN: Status: ACTIVE | Noted: 2024-05-16

## 2024-05-16 PROBLEM — R07.9 CHEST PAIN: Status: ACTIVE | Noted: 2024-05-16

## 2024-05-16 LAB
APTT PPP: 29.6 SECONDS (ref 22–38)
EKG ATRIAL RATE: 97 BPM
EKG P AXIS: 60 DEGREES
EKG P-R INTERVAL: 146 MS
EKG Q-T INTERVAL: 406 MS
EKG QRS DURATION: 88 MS
EKG QTC CALCULATION (BAZETT): 515 MS
EKG R AXIS: -29 DEGREES
EKG T AXIS: 56 DEGREES
EKG VENTRICULAR RATE: 97 BPM
GLUCOSE BLD STRIP.AUTO-MCNC: 122 MG/DL (ref 70–108)
GLUCOSE BLD STRIP.AUTO-MCNC: 149 MG/DL (ref 70–108)
GLUCOSE BLD STRIP.AUTO-MCNC: 192 MG/DL (ref 70–108)
GLUCOSE BLD STRIP.AUTO-MCNC: 194 MG/DL (ref 70–108)
HEPARIN UNFRACTIONATED: 0.52 U/ML (ref 0.3–0.7)
HEPARIN UNFRACTIONATED: 0.57 U/ML (ref 0.3–0.7)
HEPARIN UNFRACTIONATED: < 0.04 U/ML (ref 0.3–0.7)
INR PPP: 0.87 (ref 0.85–1.13)
TROPONIN, HIGH SENSITIVITY: 34 NG/L (ref 0–12)

## 2024-05-16 PROCEDURE — 2580000003 HC RX 258: Performed by: PHYSICIAN ASSISTANT

## 2024-05-16 PROCEDURE — 85610 PROTHROMBIN TIME: CPT

## 2024-05-16 PROCEDURE — 85520 HEPARIN ASSAY: CPT

## 2024-05-16 PROCEDURE — 84484 ASSAY OF TROPONIN QUANT: CPT

## 2024-05-16 PROCEDURE — 6370000000 HC RX 637 (ALT 250 FOR IP): Performed by: PHYSICIAN ASSISTANT

## 2024-05-16 PROCEDURE — 85730 THROMBOPLASTIN TIME PARTIAL: CPT

## 2024-05-16 PROCEDURE — 82948 REAGENT STRIP/BLOOD GLUCOSE: CPT

## 2024-05-16 PROCEDURE — 99223 1ST HOSP IP/OBS HIGH 75: CPT | Performed by: PHYSICIAN ASSISTANT

## 2024-05-16 PROCEDURE — 93005 ELECTROCARDIOGRAM TRACING: CPT | Performed by: EMERGENCY MEDICINE

## 2024-05-16 PROCEDURE — 99223 1ST HOSP IP/OBS HIGH 75: CPT | Performed by: INTERNAL MEDICINE

## 2024-05-16 PROCEDURE — 36415 COLL VENOUS BLD VENIPUNCTURE: CPT

## 2024-05-16 PROCEDURE — 6370000000 HC RX 637 (ALT 250 FOR IP): Performed by: EMERGENCY MEDICINE

## 2024-05-16 PROCEDURE — 93010 ELECTROCARDIOGRAM REPORT: CPT | Performed by: INTERNAL MEDICINE

## 2024-05-16 PROCEDURE — 1200000003 HC TELEMETRY R&B

## 2024-05-16 PROCEDURE — 1200000000 HC SEMI PRIVATE

## 2024-05-16 PROCEDURE — 6360000002 HC RX W HCPCS: Performed by: PHYSICIAN ASSISTANT

## 2024-05-16 RX ORDER — LEVOTHYROXINE SODIUM 0.1 MG/1
100 TABLET ORAL DAILY
Status: DISCONTINUED | OUTPATIENT
Start: 2024-05-16 | End: 2024-05-24 | Stop reason: HOSPADM

## 2024-05-16 RX ORDER — POTASSIUM CHLORIDE 20 MEQ/1
40 TABLET, EXTENDED RELEASE ORAL PRN
Status: DISCONTINUED | OUTPATIENT
Start: 2024-05-16 | End: 2024-05-24 | Stop reason: HOSPADM

## 2024-05-16 RX ORDER — FERROUS SULFATE 325(65) MG
325 TABLET ORAL
Status: DISCONTINUED | OUTPATIENT
Start: 2024-05-16 | End: 2024-05-24 | Stop reason: HOSPADM

## 2024-05-16 RX ORDER — POLYETHYLENE GLYCOL 3350 17 G/17G
17 POWDER, FOR SOLUTION ORAL DAILY PRN
Status: DISCONTINUED | OUTPATIENT
Start: 2024-05-16 | End: 2024-05-24 | Stop reason: HOSPADM

## 2024-05-16 RX ORDER — ASPIRIN 81 MG/1
324 TABLET, CHEWABLE ORAL ONCE
Status: COMPLETED | OUTPATIENT
Start: 2024-05-16 | End: 2024-05-16

## 2024-05-16 RX ORDER — HEPARIN SODIUM 1000 [USP'U]/ML
4000 INJECTION, SOLUTION INTRAVENOUS; SUBCUTANEOUS PRN
Status: DISCONTINUED | OUTPATIENT
Start: 2024-05-16 | End: 2024-05-16

## 2024-05-16 RX ORDER — INSULIN LISPRO 100 [IU]/ML
5 INJECTION, SOLUTION INTRAVENOUS; SUBCUTANEOUS
Status: DISCONTINUED | OUTPATIENT
Start: 2024-05-16 | End: 2024-05-24 | Stop reason: HOSPADM

## 2024-05-16 RX ORDER — PANTOPRAZOLE SODIUM 40 MG/1
40 TABLET, DELAYED RELEASE ORAL DAILY
COMMUNITY

## 2024-05-16 RX ORDER — HEPARIN SODIUM 10000 [USP'U]/100ML
5-30 INJECTION, SOLUTION INTRAVENOUS CONTINUOUS
Status: DISCONTINUED | OUTPATIENT
Start: 2024-05-16 | End: 2024-05-16

## 2024-05-16 RX ORDER — DEXTROSE MONOHYDRATE 100 MG/ML
INJECTION, SOLUTION INTRAVENOUS CONTINUOUS PRN
Status: DISCONTINUED | OUTPATIENT
Start: 2024-05-16 | End: 2024-05-24 | Stop reason: HOSPADM

## 2024-05-16 RX ORDER — PANTOPRAZOLE SODIUM 40 MG/1
40 TABLET, DELAYED RELEASE ORAL DAILY
Status: DISCONTINUED | OUTPATIENT
Start: 2024-05-16 | End: 2024-05-24 | Stop reason: HOSPADM

## 2024-05-16 RX ORDER — GLUCAGON 1 MG/ML
1 KIT INJECTION PRN
Status: DISCONTINUED | OUTPATIENT
Start: 2024-05-16 | End: 2024-05-24 | Stop reason: HOSPADM

## 2024-05-16 RX ORDER — POTASSIUM CHLORIDE 7.45 MG/ML
10 INJECTION INTRAVENOUS PRN
Status: DISCONTINUED | OUTPATIENT
Start: 2024-05-16 | End: 2024-05-24 | Stop reason: HOSPADM

## 2024-05-16 RX ORDER — ONDANSETRON 2 MG/ML
4 INJECTION INTRAMUSCULAR; INTRAVENOUS EVERY 6 HOURS PRN
Status: DISCONTINUED | OUTPATIENT
Start: 2024-05-16 | End: 2024-05-24 | Stop reason: HOSPADM

## 2024-05-16 RX ORDER — ACETAMINOPHEN 650 MG/1
650 SUPPOSITORY RECTAL EVERY 6 HOURS PRN
Status: DISCONTINUED | OUTPATIENT
Start: 2024-05-16 | End: 2024-05-24 | Stop reason: HOSPADM

## 2024-05-16 RX ORDER — SODIUM CHLORIDE 0.9 % (FLUSH) 0.9 %
5-40 SYRINGE (ML) INJECTION PRN
Status: DISCONTINUED | OUTPATIENT
Start: 2024-05-16 | End: 2024-05-24 | Stop reason: HOSPADM

## 2024-05-16 RX ORDER — ONDANSETRON 4 MG/1
4 TABLET, ORALLY DISINTEGRATING ORAL EVERY 8 HOURS PRN
Status: DISCONTINUED | OUTPATIENT
Start: 2024-05-16 | End: 2024-05-24 | Stop reason: HOSPADM

## 2024-05-16 RX ORDER — CITALOPRAM 20 MG/1
20 TABLET ORAL DAILY
Status: DISCONTINUED | OUTPATIENT
Start: 2024-05-16 | End: 2024-05-24 | Stop reason: HOSPADM

## 2024-05-16 RX ORDER — SODIUM CHLORIDE 0.9 % (FLUSH) 0.9 %
5-40 SYRINGE (ML) INJECTION EVERY 12 HOURS SCHEDULED
Status: DISCONTINUED | OUTPATIENT
Start: 2024-05-16 | End: 2024-05-24 | Stop reason: HOSPADM

## 2024-05-16 RX ORDER — MAGNESIUM SULFATE IN WATER 40 MG/ML
2000 INJECTION, SOLUTION INTRAVENOUS PRN
Status: DISCONTINUED | OUTPATIENT
Start: 2024-05-16 | End: 2024-05-24 | Stop reason: HOSPADM

## 2024-05-16 RX ORDER — SODIUM CHLORIDE 9 MG/ML
INJECTION, SOLUTION INTRAVENOUS PRN
Status: DISCONTINUED | OUTPATIENT
Start: 2024-05-16 | End: 2024-05-24 | Stop reason: HOSPADM

## 2024-05-16 RX ORDER — ENOXAPARIN SODIUM 100 MG/ML
40 INJECTION SUBCUTANEOUS DAILY
Status: DISCONTINUED | OUTPATIENT
Start: 2024-05-16 | End: 2024-05-16

## 2024-05-16 RX ORDER — DONEPEZIL HYDROCHLORIDE 10 MG/1
10 TABLET, FILM COATED ORAL NIGHTLY
Status: DISCONTINUED | OUTPATIENT
Start: 2024-05-16 | End: 2024-05-24 | Stop reason: HOSPADM

## 2024-05-16 RX ORDER — HEPARIN SODIUM 1000 [USP'U]/ML
4000 INJECTION, SOLUTION INTRAVENOUS; SUBCUTANEOUS ONCE
Status: COMPLETED | OUTPATIENT
Start: 2024-05-16 | End: 2024-05-16

## 2024-05-16 RX ORDER — ROSUVASTATIN CALCIUM 10 MG/1
10 TABLET, COATED ORAL NIGHTLY
Status: DISCONTINUED | OUTPATIENT
Start: 2024-05-16 | End: 2024-05-24 | Stop reason: HOSPADM

## 2024-05-16 RX ORDER — INSULIN GLARGINE 100 [IU]/ML
26 INJECTION, SOLUTION SUBCUTANEOUS DAILY
Status: DISCONTINUED | OUTPATIENT
Start: 2024-05-16 | End: 2024-05-24 | Stop reason: HOSPADM

## 2024-05-16 RX ORDER — CLOPIDOGREL BISULFATE 75 MG/1
75 TABLET ORAL DAILY
Status: DISCONTINUED | OUTPATIENT
Start: 2024-05-16 | End: 2024-05-24 | Stop reason: HOSPADM

## 2024-05-16 RX ORDER — MIDODRINE HYDROCHLORIDE 10 MG/1
10 TABLET ORAL
Status: DISCONTINUED | OUTPATIENT
Start: 2024-05-16 | End: 2024-05-24 | Stop reason: HOSPADM

## 2024-05-16 RX ORDER — FLUDROCORTISONE ACETATE 0.1 MG/1
0.1 TABLET ORAL DAILY
Status: DISCONTINUED | OUTPATIENT
Start: 2024-05-16 | End: 2024-05-24 | Stop reason: HOSPADM

## 2024-05-16 RX ORDER — ASPIRIN 81 MG/1
81 TABLET, CHEWABLE ORAL DAILY
Status: DISCONTINUED | OUTPATIENT
Start: 2024-05-16 | End: 2024-05-24 | Stop reason: HOSPADM

## 2024-05-16 RX ORDER — HEPARIN SODIUM 1000 [USP'U]/ML
2000 INJECTION, SOLUTION INTRAVENOUS; SUBCUTANEOUS PRN
Status: DISCONTINUED | OUTPATIENT
Start: 2024-05-16 | End: 2024-05-16

## 2024-05-16 RX ORDER — THIAMINE MONONITRATE (VIT B1) 100 MG
100 TABLET ORAL DAILY
COMMUNITY

## 2024-05-16 RX ORDER — METOPROLOL SUCCINATE 50 MG/1
50 TABLET, EXTENDED RELEASE ORAL DAILY
Status: DISCONTINUED | OUTPATIENT
Start: 2024-05-16 | End: 2024-05-24 | Stop reason: HOSPADM

## 2024-05-16 RX ORDER — ACETAMINOPHEN 325 MG/1
650 TABLET ORAL EVERY 6 HOURS PRN
Status: DISCONTINUED | OUTPATIENT
Start: 2024-05-16 | End: 2024-05-24 | Stop reason: HOSPADM

## 2024-05-16 RX ADMIN — CITALOPRAM HYDROBROMIDE 20 MG: 20 TABLET ORAL at 08:41

## 2024-05-16 RX ADMIN — FERROUS SULFATE TAB 325 MG (65 MG ELEMENTAL FE) 325 MG: 325 (65 FE) TAB at 08:40

## 2024-05-16 RX ADMIN — EMPAGLIFLOZIN 10 MG: 10 TABLET, FILM COATED ORAL at 08:40

## 2024-05-16 RX ADMIN — METOPROLOL SUCCINATE 50 MG: 50 TABLET, EXTENDED RELEASE ORAL at 08:40

## 2024-05-16 RX ADMIN — HEPARIN SODIUM AND DEXTROSE 11 UNITS/KG/HR: 10000; 5 INJECTION INTRAVENOUS at 07:44

## 2024-05-16 RX ADMIN — ASPIRIN 81 MG 81 MG: 81 TABLET ORAL at 08:41

## 2024-05-16 RX ADMIN — PANTOPRAZOLE SODIUM 40 MG: 40 TABLET, DELAYED RELEASE ORAL at 07:15

## 2024-05-16 RX ADMIN — FLUDROCORTISONE ACETATE 0.1 MG: 0.1 TABLET ORAL at 08:43

## 2024-05-16 RX ADMIN — HEPARIN SODIUM 4000 UNITS: 1000 INJECTION INTRAVENOUS; SUBCUTANEOUS at 07:44

## 2024-05-16 RX ADMIN — MIDODRINE HYDROCHLORIDE 10 MG: 10 TABLET ORAL at 08:40

## 2024-05-16 RX ADMIN — SODIUM CHLORIDE, PRESERVATIVE FREE 10 ML: 5 INJECTION INTRAVENOUS at 20:29

## 2024-05-16 RX ADMIN — ASPIRIN 81 MG 324 MG: 81 TABLET ORAL at 00:18

## 2024-05-16 RX ADMIN — DONEPEZIL HYDROCHLORIDE 10 MG: 10 TABLET, FILM COATED ORAL at 20:29

## 2024-05-16 RX ADMIN — MIDODRINE HYDROCHLORIDE 10 MG: 10 TABLET ORAL at 12:33

## 2024-05-16 RX ADMIN — CLOPIDOGREL BISULFATE 75 MG: 75 TABLET ORAL at 08:41

## 2024-05-16 RX ADMIN — WATER 5 MG: 1 INJECTION INTRAMUSCULAR; INTRAVENOUS; SUBCUTANEOUS at 21:27

## 2024-05-16 RX ADMIN — INSULIN GLARGINE 26 UNITS: 100 INJECTION, SOLUTION SUBCUTANEOUS at 08:41

## 2024-05-16 RX ADMIN — ROSUVASTATIN 10 MG: 10 TABLET, FILM COATED ORAL at 20:29

## 2024-05-16 RX ADMIN — MIDODRINE HYDROCHLORIDE 10 MG: 10 TABLET ORAL at 16:13

## 2024-05-16 RX ADMIN — SODIUM CHLORIDE, PRESERVATIVE FREE 10 ML: 5 INJECTION INTRAVENOUS at 10:05

## 2024-05-16 ASSESSMENT — PAIN - FUNCTIONAL ASSESSMENT
PAIN_FUNCTIONAL_ASSESSMENT: 0-10
PAIN_FUNCTIONAL_ASSESSMENT: NONE - DENIES PAIN
PAIN_FUNCTIONAL_ASSESSMENT: NONE - DENIES PAIN

## 2024-05-16 ASSESSMENT — PAIN SCALES - GENERAL
PAINLEVEL_OUTOF10: 0
PAINLEVEL_OUTOF10: 7
PAINLEVEL_OUTOF10: 7

## 2024-05-16 NOTE — ED NOTES
Patient resting in bed. Respirations easy and unlabored. No distress noted. Call light within reach. States he needs to leave to find his wife. Sugar free ginger ale provided.

## 2024-05-16 NOTE — PLAN OF CARE
Problem: Discharge Planning  Goal: Discharge to home or other facility with appropriate resources  Outcome: Progressing  Flowsheets  Taken 5/16/2024 0513  Discharge to home or other facility with appropriate resources: Identify barriers to discharge with patient and caregiver  Taken 5/16/2024 9475  Discharge to home or other facility with appropriate resources: Identify barriers to discharge with patient and caregiver     Problem: Safety - Adult  Goal: Free from fall injury  Outcome: Progressing     Problem: ABCDS Injury Assessment  Goal: Absence of physical injury  Outcome: Progressing     Problem: Skin/Tissue Integrity  Goal: Absence of new skin breakdown  Description: 1.  Monitor for areas of redness and/or skin breakdown  2.  Assess vascular access sites hourly  3.  Every 4-6 hours minimum:  Change oxygen saturation probe site  4.  Every 4-6 hours:  If on nasal continuous positive airway pressure, respiratory therapy assess nares and determine need for appliance change or resting period.  Outcome: Progressing     Problem: Pain  Goal: Verbalizes/displays adequate comfort level or baseline comfort level  Outcome: Progressing

## 2024-05-16 NOTE — ED NOTES
ED to inpatient nurses report      Chief Complaint:  Chief Complaint   Patient presents with    Chest Pain     Present to ED from: [facility]    MOA:     LOC: alert and orientated to name and place  Mobility: Requires assistance * 2  Oxygen Baseline: ra    Current needs required: ra     Code Status:   DNR-CCA    What abnormal results were found and what did you give/do to treat them? Elevated troponin   Any procedures or intervention occur? N/a    Mental Status:  Level of Consciousness: Alert (0)    Psych Assessment:        Vitals:  Patient Vitals for the past 24 hrs:   BP Temp Temp src Pulse Resp SpO2 Height Weight   05/16/24 0300 (!) 125/59 97.8 °F (36.6 °C) -- 90 15 94 % -- --   05/16/24 0129 (!) 128/100 -- -- 89 15 94 % -- --   05/16/24 0019 (!) 129/44 -- -- 98 18 94 % -- --   05/15/24 2314 107/60 -- -- (!) 105 15 94 % -- --   05/15/24 2224 (!) 135/47 -- -- (!) 112 21 95 % -- --   05/15/24 2056 102/62 -- Oral (!) 110 17 96 % 1.702 m (5' 7\") 83.9 kg (185 lb)        LDAs:      Ambulatory Status:  No data recorded    Diagnosis:  DISPOSITION Admitted 05/16/2024 03:02:58 AM   Final diagnoses:   Chest pain, unspecified type        Consults:  None     Pain Score:  Pain Assessment  Pain Assessment: None - Denies Pain  Pain Level: 7    C-SSRS:   Risk of Suicide: No Risk    Sepsis Screening:  Sepsis Risk Score: 1.15    [place] Fall Risk:       Swallow Screening        Preferred Language:   English      ALLERGIES     Nsaids and Sulfa antibiotics    SURGICAL HISTORY       Past Surgical History:   Procedure Laterality Date    BACK SURGERY  04/1991    lumbar laminectomy    CARDIAC CATHETERIZATION  12/2005    CARDIAC CATHETERIZATION  07/11/2018    CARDIOVASCULAR STRESS TEST  08/2015    CARPAL TUNNEL RELEASE Right 1970    CHOLECYSTECTOMY      COLONOSCOPY      2004, 2007, 2012,  2017    DEBRIDEMENT Left 12/2003    Ear    EYE SURGERY  2005    bilateral cataracts    IMPLANTATION VAGAL NERVE STIMULATOR N/A 12/7/2018

## 2024-05-16 NOTE — ED TRIAGE NOTES
Pt present to Ed from [facility] with c/c of chest pain and SOB. Pt states pain began yesterday and does not remember what he was doing. Pt states \" I cannot even remember what was yesterday, you probably now more about me than I do.\" EMS gave pt 1 nitro and 4 baby aspirn. States BP began to drop after nitro. Pt states pain is an 8 in chest. Rr easy and unlabored. No distress noted.

## 2024-05-16 NOTE — DISCHARGE INSTR - COC
Continuity of Care Form    Patient Name: Harsha Jacobson   :  1940  MRN:  [id number]    Admit date:  5/15/2024  Discharge date:  2024    Code Status Order: Full Code   Advance Directives:     Admitting Physician:  No admitting provider for patient encounter.  PCP: Yo Thomas MD    Discharging Nurse: Renate MORALEZ  Discharging Hospital Unit/Room#: 8A-15/015-A  Discharging Unit Phone Number: [phone]    Emergency Contact:   Extended Emergency Contact Information  Primary Emergency Contact: Neva Jacobson  Address: [address] [place] [address] [place] [address] [place]  Home Phone: [phone]  Mobile Phone: [phone]  Relation: Spouse  Hearing or visual needs: None  Other needs: None  Preferred language: English   needed? No    Past Surgical History:  Past Surgical History:   Procedure Laterality Date    BACK SURGERY  1991    lumbar laminectomy    CARDIAC CATHETERIZATION  2005    CARDIAC CATHETERIZATION  2018    CARDIOVASCULAR STRESS TEST  2015    CARPAL TUNNEL RELEASE Right 1970    CHOLECYSTECTOMY      COLONOSCOPY      , , ,      DEBRIDEMENT Left 2003    Ear    EYE SURGERY  2005    bilateral cataracts    IMPLANTATION VAGAL NERVE STIMULATOR N/A 2018    THORACIC LAMINECTOM PERMANENT SPINAL CORD STIMULATOR PADDLE AND BATTERY PLACEMENT performed by Rubio Felix MD at [facility] OR    NERVE BLOCK LUMB FACET LEVEL 1 BILATERAL Bilateral 2017    LUMBAR FACET MBB   L3-4, L4-5, L5-S1 BILATERAL performed by Bi Olivas MD at [facility] OR    NERVE SURGERY Bilateral 2018    LUMBAR FACET MBB L3-4, L4-5, L5-S1    OTHER SURGICAL HISTORY Right 2018    Lumbar RFA at L3-4, L4-5, L5-S1    OTHER SURGICAL HISTORY Left 2018    Lumbar RFA at L3-4, L4-5, L5-S1    CT NJX DX/THER AGT PVRT FACET JT LMBR/SAC 1 LEVEL Bilateral 2018    LUMBAR FACET MBB #2  L3-4, L4-5, L5-S1 BILATERAL performed by Bi Olivas MD

## 2024-05-16 NOTE — CARE COORDINATION
5/16/24, 1:33 PM EDT  Discharge Planning Evaluation  Social work consult received, patient from [facility], [facility].    Patient/Family preference is to return to [facility] per wife Neva.    The patient's current payor source at the facility is Medicare.   Medicare skilled days available: yes  Medicare does the patient have a three midnight qualifying stay? N/a  Insurance precert:  yes  Spoke with Sherry at the facility.  Patient bed hold: Sherry will talk with wife about holding bed   Anticipated transport plan: ambulance  Patient's Healthcare Decision Maker: Legal Next of Kin    Readmission Risk Low 0-14, Mod 15-19), High > 20: Readmission Risk Score: 14.7    Current PCP: Yo Thomas MD  PCP verified by CM? Yes    Patient Orientation: Person, Place    Patient Cognition: Dementia / Early Alzheimer's  History Provided by: Spouse    Advance Directives:      Code Status: Full Code   Patient's Primary Decision Maker is: Legal Next of Kin       Discharge Planning:    Patient lives with: Other (Comment) Type of Home: Skilled Nursing Facility  Primary Care Giver: Other (Comment) ([facility])  Patient Support Systems include: Spouse/Significant Other   Current Financial resources: Medicare  Current community resources:    Current services prior to admission: Skilled Nursing Facility            Current DME:              Type of Home Care services:  None    ADLS  Prior functional level: Assistance with the following:, Bathing, Dressing, Cooking, Housework, Shopping  Current functional level: Assistance with the following:, Bathing, Dressing, Cooking, Housework, Shopping    Family can provide assistance at DC: No  Would you like Case Management to discuss the discharge plan with any other family members/significant others, and if so, who? Yes (Wife Neva)  Plans to Return to Present Housing: Yes  Other Identified Issues/Barriers to RETURNING to current housing: none  Potential Assistance needed at discharge: Skilled Nursing

## 2024-05-16 NOTE — PROGRESS NOTES
Hospitalist Progress Note    Patient:  Harsha Jacobson      Unit/Bed:8A-15/015-A    YOB: 1940    MRN: [id number]       Acct: [id number]     PCP: Yo Thomas MD    Date of Admission: 5/15/2024    Assessment/Plan:    1.Elevated troponin in patient with known CAD:      Patient on Heparin drip     Serial troponin 34,41,34              Continue Rosuvastatin, ASA,Metoprolol, Clopidogrel      Cardiology consulted to evaluate    2. Coronary Artery Disease               Continue Rosuvastatin, ASA, Metoprolol,Clopidogrel                3. Hyperlipidemia              Continue Rosuvastatin     4. IDDM type II:   Continue long acting and short acting insulin coverage  Continue Jardiance  Continue hypoglycemic protocol   Monitor glucose   ADA diet               4.Thyroid disease           Patient on Levothyroxine               5. GERD continue Protonix               6. Cognitive impairment/dementia:                Continue Donepezil    patient with confusion.  Monitor cognition  Supportive care            7. 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  Patient is really unable to provide any details regarding the chest pain due to dementia.  Patient's EKG is nonacute.  Troponins are found to be elevated.  Patient will be admitted on heparin drip and evaluated by cardiology.      Subjective: Patient examined and evaluated was resting on the bed was alert with confusion,follows simple commands, Was on room air breathing non labored       Medications:

## 2024-05-16 NOTE — CONSULTS
[facility]'s  Consult    Patient's Name/Date of Birth: Harsha Jacobson / 1940 (84 y.o.)    Date: May 16, 2024     Referring Provider: Leandra Ward APRN - CNP    CHIEF COMPLAINT:      HPI: This is a pleasant 84 y.o. male presents with chief complaint of chest pain.  Chest pain is reproducible with movement of right arm and is described as sharp in nature.  Patient says this been ongoing for couple weeks and is worse when he is standing up out of chairs and beds.  He says pain happens once or twice a day.  He was able to move his arm around and reproduce the pain for me point to the exact location just to the right of his sternum where the pain is worst.  He denies any associated shortness of breath, radiation of the pain to the arm or jaw, nausea, or vomiting.            Echo: 11/30/2020     Summary   Doppler parameters were consistent with abnormal left ventricular   relaxation (grade 1 diastolic dysfunction).   Normal left ventricle size and systolic function. Ejection fraction was   estimated at -55-%. There were no regional left ventricular wall motion   abnormalities and wall thickness was within normal limits.   Doppler parameters were consistent with abnormal left ventricular   relaxation (grade 1 diastolic dysfunction).       Heart Cath: 7/11/2018    CAD  1.  Preserved systolic function of left ventricle and ejection fraction was  55%.  No gross wall motion abnormalities were seen.  No mitral regurg.  No  gradient across the aortic valve.  2.  Nondominant RCA with moderately severe narrowing proximally and  distally.  3.  Patent short left main.  4.  Dominant _____ circumflex artery with calcification of the proximal  portion of the circumflex with diffuse disease.     5.  Severe stenosis of the mid PDA of the circumflex artery, diffuse  disease distally.  6.  First obtuse marginal has moderate diffuse disease about 40%-50%.  7.  Diffuse moderate disease of the proximal LAD about  Determinants of Health     Financial Resource Strain: Not on file   Food Insecurity: Not on file   Transportation Needs: Not on file   Physical Activity: Not on file   Stress: Not on file   Social Connections: Not on file   Intimate Partner Violence: Not on file   Housing Stability: Not on file     ROS:   Constitutional: Denies any recent wt change.  Eyes:  Denies any blurring or double vision, no glaucoma  Ears/Nose/Mouth/Throat:  Denies any chronic sinus/rhinitis, bleeding gums  Cardiovascular:  As described above.    Respiratory:  Denies any frequent cough, wheezing or coughing up blood  Genitourinary:  Denies difficulty with urination and kidney stones  Gastrointestinal:  Denies any chronic problems with abdominal pain, nausea, vomiting or diarrhea  Musculoskeletal:  Denies any joint pain, back pain, or difficulty walking  Integumentary:  Denies any rash  Neurological:  No numbness or tingling  Endocrine:  Denies any polydipsia.    Hematologic/Lymphatic:  Denies any hemorrhage or lymphatic drainage problems.  Labs:  CBC:   Recent Labs     05/15/24  2117   WBC 7.7   HGB 12.9*   HCT 40.3*   MCV 93.3        BMP:   Recent Labs     05/15/24  2117      K 4.6      CO2 24   BUN 20   CREATININE 1.1     Accucheck Glucoses:   Recent Labs     05/16/24  0835 05/16/24  1216   POCGLU 122* 149*     Cardiac Enzymes: No results for input(s): \"CKTOTAL\", \"CKMB\", \"CKMBINDEX\", \"TROPONINI\" in the last 72 hours.  PT/INR:   Recent Labs     05/16/24  0707   INR 0.87     APTT:   Recent Labs     05/16/24  0707   APTT 29.6     Liver Profile:  Lab Results   Component Value Date/Time    AST 12 08/07/2023 01:25 PM    ALT 8 08/07/2023 01:25 PM    BILIDIR <0.2 08/07/2023 01:25 PM    BILITOT 0.7 08/07/2023 01:25 PM    ALKPHOS 97 08/07/2023 01:25 PM    PROTEIN 6.9 08/07/2023 01:25 PM     Lab Results   Component Value Date/Time    CHOL 146 08/07/2023 01:25 PM    HDL 52 08/07/2023 01:25 PM    TRIG 138 08/07/2023 01:25 PM     TSH:

## 2024-05-16 NOTE — H&P
Hospitalist - History & Physical      Patient: Harsha Jacobson    Unit/Bed:8A-15/015-A  YOB: 1940  MRN: [id number]   Acct: [id number]   PCP: Yo Thomas MD      Assessment and Plan:        Elevated troponin in known CAD:   Uptrending in the ED  Initiate heparin drip  Continue statin, ASA, and clopidogrel therapy  Consult cardiology  IDDM type II:   Continue long acting and short acting insulin coverage  ADA diet  Hypoglycemic treatment orders  Cognitive impairment/dementia:   Unsure of baseline  At presentation patient is confused and not able to provide an independent history      CC:  chest pain    HPI: Patient is transferred to the emergency department from skilled nursing facility for complaint of chest pain.  Patient is really unable to provide any details regarding the chest pain due to dementia.  Patient's EKG is nonacute.  Troponins are found to be elevated.  Patient will be admitted on heparin drip and evaluated by cardiology.    ROS: Review of Systems   Unable to perform ROS: Dementia   Cardiovascular:  Positive for chest pain.     PMH:    Past Medical History:   Diagnosis Date    Arthritis     CAD (coronary artery disease)     Depression     Diabetes mellitus (HCC)     GERD (gastroesophageal reflux disease)     Hyperlipidemia     Thyroid disease      SHX:    Social History     Socioeconomic History    Marital status:      Spouse name: Not on file    Number of children: Not on file    Years of education: Not on file    Highest education level: Not on file   Occupational History    Not on file   Tobacco Use    Smoking status: Every Day     Types: Pipe, Cigars    Smokeless tobacco: Never   Vaping Use    Vaping Use: Never used   Substance and Sexual Activity    Alcohol use: No    Drug use: No    Sexual activity: Not on file   Other Topics Concern    Not on file   Social History Narrative    Not on file     Social Determinants of Health     Financial Resource Strain: Not on file

## 2024-05-16 NOTE — PROGRESS NOTES
Pt arrived on floor and noted to be pleasantly confused. Pt oriented to room and call light system will continue to reorient. Pt noted to have scattered bruising and abrasions to BUE BLE. Otherwise skin is clean dry and intact. Information for admission obtained from facility transfer sheet and wife.pt now resting in bed with no complaints at this time. .e

## 2024-05-16 NOTE — CARE COORDINATION
Case Management Assessment Initial Evaluation    Date/Time of Evaluation: 5/16/2024 3:15 PM  Assessment Completed by: Juanita Ryan RN    If patient is discharged prior to next notation, then this note serves as note for discharge by case management.    Patient Name: Harsha Jacobson                   YOB: 1940  Diagnosis: Elevated troponin [R79.89]  Chest pain, unspecified type [R07.9]                   Date / Time: 5/15/2024  8:52 PM  Location: [address] [place] [address]     Patient Admission Status: Inpatient   Readmission Risk Low 0-14, Mod 15-19), High > 20: Readmission Risk Score: 14.7    Current PCP: Yo Thomas MD    Additional Case Management Notes: Admit from ER with chest pain and SOB. Trend troponin. Cardiology consulted. Heparin gtt.    05/15/24 21:17 05/15/24 22:54 05/16/24 00:52   Troponin, High Sensitivity 34 (H) 41 (H) 34 (H)     Procedures: none    Imaging: ECHO ordered    Patient Goals/Plan/Treatment Preferences: Planning to return to [facility]. SW following.   Will need precert to return. Therapy is ordered.

## 2024-05-17 ENCOUNTER — APPOINTMENT (OUTPATIENT)
Age: 84
End: 2024-05-17
Payer: MEDICARE

## 2024-05-17 PROBLEM — E07.9 THYROID DISEASE: Status: ACTIVE | Noted: 2022-04-08

## 2024-05-17 PROBLEM — E78.5 HYPERLIPIDEMIA: Status: ACTIVE | Noted: 2024-05-17

## 2024-05-17 PROBLEM — E11.9 TYPE 2 DIABETES MELLITUS WITHOUT COMPLICATION, WITH LONG-TERM CURRENT USE OF INSULIN (HCC): Status: ACTIVE | Noted: 2020-12-03

## 2024-05-17 PROBLEM — Z79.4 TYPE 2 DIABETES MELLITUS WITHOUT COMPLICATION, WITH LONG-TERM CURRENT USE OF INSULIN (HCC): Status: ACTIVE | Noted: 2020-12-03

## 2024-05-17 LAB
ANION GAP SERPL CALC-SCNC: 12 MEQ/L (ref 8–16)
BASOPHILS ABSOLUTE: 0 THOU/MM3 (ref 0–0.1)
BASOPHILS NFR BLD AUTO: 0.6 %
BUN SERPL-MCNC: 10 MG/DL (ref 7–22)
CALCIUM SERPL-MCNC: 9.1 MG/DL (ref 8.5–10.5)
CHLORIDE SERPL-SCNC: 107 MEQ/L (ref 98–111)
CO2 SERPL-SCNC: 26 MEQ/L (ref 23–33)
CREAT SERPL-MCNC: 0.8 MG/DL (ref 0.4–1.2)
DEPRECATED RDW RBC AUTO: 48.3 FL (ref 35–45)
ECHO AO ASC DIAM: 3.2 CM
ECHO AO ASCENDING AORTA INDEX: 1.6 CM/M2
ECHO AV CUSP MM: 1.8 CM
ECHO AV PEAK GRADIENT: 6 MMHG
ECHO AV PEAK VELOCITY: 1.3 M/S
ECHO AV VELOCITY RATIO: 0.54
ECHO BSA: 2.04 M2
ECHO LA AREA 2C: 15.3 CM2
ECHO LA AREA 4C: 18 CM2
ECHO LA DIAMETER INDEX: 1.65 CM/M2
ECHO LA DIAMETER: 3.3 CM
ECHO LA MAJOR AXIS: 6.4 CM
ECHO LA MINOR AXIS: 5.3 CM
ECHO LA VOL BP: 41 ML (ref 18–58)
ECHO LA VOL MOD A2C: 36 ML (ref 18–58)
ECHO LA VOL MOD A4C: 40 ML (ref 18–58)
ECHO LA VOL/BSA BIPLANE: 21 ML/M2 (ref 16–34)
ECHO LA VOLUME INDEX MOD A2C: 18 ML/M2 (ref 16–34)
ECHO LA VOLUME INDEX MOD A4C: 20 ML/M2 (ref 16–34)
ECHO LV FRACTIONAL SHORTENING: 30 % (ref 28–44)
ECHO LV INTERNAL DIMENSION DIASTOLE INDEX: 2.2 CM/M2
ECHO LV INTERNAL DIMENSION DIASTOLIC: 4.4 CM (ref 4.2–5.9)
ECHO LV INTERNAL DIMENSION SYSTOLIC INDEX: 1.55 CM/M2
ECHO LV INTERNAL DIMENSION SYSTOLIC: 3.1 CM
ECHO LV ISOVOLUMETRIC RELAXATION TIME (IVRT): 123 MS
ECHO LV IVSD: 1.2 CM (ref 0.6–1)
ECHO LV MASS 2D: 180 G (ref 88–224)
ECHO LV MASS INDEX 2D: 90 G/M2 (ref 49–115)
ECHO LV POSTERIOR WALL DIASTOLIC: 1.1 CM (ref 0.6–1)
ECHO LV RELATIVE WALL THICKNESS RATIO: 0.5
ECHO LVOT PEAK GRADIENT: 2 MMHG
ECHO LVOT PEAK VELOCITY: 0.7 M/S
ECHO MV A VELOCITY: 0.86 M/S
ECHO MV E DECELERATION TIME (DT): 204 MS
ECHO MV E VELOCITY: 0.5 M/S
ECHO MV E/A RATIO: 0.58
ECHO PV MAX VELOCITY: 0.7 M/S
ECHO PV PEAK GRADIENT: 2 MMHG
ECHO RV INTERNAL DIMENSION: 2.9 CM
ECHO TV E WAVE: 0.7 M/S
ECHO TV REGURGITANT MAX VELOCITY: 2.55 M/S
ECHO TV REGURGITANT PEAK GRADIENT: 26 MMHG
EOSINOPHIL NFR BLD AUTO: 3.1 %
EOSINOPHILS ABSOLUTE: 0.2 THOU/MM3 (ref 0–0.4)
ERYTHROCYTE [DISTWIDTH] IN BLOOD BY AUTOMATED COUNT: 14.3 % (ref 11.5–14.5)
GFR SERPL CREATININE-BSD FRML MDRD: 86 ML/MIN/1.73M2
GLUCOSE BLD STRIP.AUTO-MCNC: 102 MG/DL (ref 70–108)
GLUCOSE BLD STRIP.AUTO-MCNC: 105 MG/DL (ref 70–108)
GLUCOSE BLD STRIP.AUTO-MCNC: 116 MG/DL (ref 70–108)
GLUCOSE BLD STRIP.AUTO-MCNC: 117 MG/DL (ref 70–108)
GLUCOSE SERPL-MCNC: 113 MG/DL (ref 70–108)
HCT VFR BLD AUTO: 41 % (ref 42–52)
HEPARIN UNFRACTIONATED: 0.04 U/ML (ref 0.3–0.7)
HEPARIN UNFRACTIONATED: < 0.04 U/ML (ref 0.3–0.7)
HGB BLD-MCNC: 13.2 GM/DL (ref 14–18)
IMM GRANULOCYTES # BLD AUTO: 0.02 THOU/MM3 (ref 0–0.07)
IMM GRANULOCYTES NFR BLD AUTO: 0.3 %
LYMPHOCYTES ABSOLUTE: 1.1 THOU/MM3 (ref 1–4.8)
LYMPHOCYTES NFR BLD AUTO: 15.6 %
MAGNESIUM SERPL-MCNC: 2.1 MG/DL (ref 1.6–2.4)
MCH RBC QN AUTO: 29.7 PG (ref 26–33)
MCHC RBC AUTO-ENTMCNC: 32.2 GM/DL (ref 32.2–35.5)
MCV RBC AUTO: 92.1 FL (ref 80–94)
MONOCYTES ABSOLUTE: 0.9 THOU/MM3 (ref 0.4–1.3)
MONOCYTES NFR BLD AUTO: 12.8 %
NEUTROPHILS ABSOLUTE: 4.9 THOU/MM3 (ref 1.8–7.7)
NEUTROPHILS NFR BLD AUTO: 67.6 %
NRBC BLD AUTO-RTO: 0 /100 WBC
PLATELET # BLD AUTO: 182 THOU/MM3 (ref 130–400)
PMV BLD AUTO: 11.2 FL (ref 9.4–12.4)
POTASSIUM SERPL-SCNC: 3.3 MEQ/L (ref 3.5–5.2)
RBC # BLD AUTO: 4.45 MILL/MM3 (ref 4.7–6.1)
SODIUM SERPL-SCNC: 145 MEQ/L (ref 135–145)
WBC # BLD AUTO: 7.2 THOU/MM3 (ref 4.8–10.8)

## 2024-05-17 PROCEDURE — 83735 ASSAY OF MAGNESIUM: CPT

## 2024-05-17 PROCEDURE — 82948 REAGENT STRIP/BLOOD GLUCOSE: CPT

## 2024-05-17 PROCEDURE — 99232 SBSQ HOSP IP/OBS MODERATE 35: CPT | Performed by: NURSE PRACTITIONER

## 2024-05-17 PROCEDURE — 93306 TTE W/DOPPLER COMPLETE: CPT | Performed by: INTERNAL MEDICINE

## 2024-05-17 PROCEDURE — 85520 HEPARIN ASSAY: CPT

## 2024-05-17 PROCEDURE — 85025 COMPLETE CBC W/AUTO DIFF WBC: CPT

## 2024-05-17 PROCEDURE — 93306 TTE W/DOPPLER COMPLETE: CPT

## 2024-05-17 PROCEDURE — 36415 COLL VENOUS BLD VENIPUNCTURE: CPT

## 2024-05-17 PROCEDURE — 1200000003 HC TELEMETRY R&B

## 2024-05-17 PROCEDURE — 2580000003 HC RX 258: Performed by: PHYSICIAN ASSISTANT

## 2024-05-17 PROCEDURE — 6360000002 HC RX W HCPCS: Performed by: PHYSICIAN ASSISTANT

## 2024-05-17 PROCEDURE — 1200000000 HC SEMI PRIVATE

## 2024-05-17 PROCEDURE — 6370000000 HC RX 637 (ALT 250 FOR IP): Performed by: PHYSICIAN ASSISTANT

## 2024-05-17 PROCEDURE — 80048 BASIC METABOLIC PNL TOTAL CA: CPT

## 2024-05-17 RX ADMIN — ROSUVASTATIN 10 MG: 10 TABLET, FILM COATED ORAL at 22:04

## 2024-05-17 RX ADMIN — PANTOPRAZOLE SODIUM 40 MG: 40 TABLET, DELAYED RELEASE ORAL at 06:27

## 2024-05-17 RX ADMIN — SODIUM CHLORIDE, PRESERVATIVE FREE 10 ML: 5 INJECTION INTRAVENOUS at 10:21

## 2024-05-17 RX ADMIN — MIDODRINE HYDROCHLORIDE 10 MG: 10 TABLET ORAL at 16:12

## 2024-05-17 RX ADMIN — SODIUM CHLORIDE, PRESERVATIVE FREE 10 ML: 5 INJECTION INTRAVENOUS at 22:04

## 2024-05-17 RX ADMIN — WATER 5 MG: 1 INJECTION INTRAMUSCULAR; INTRAVENOUS; SUBCUTANEOUS at 00:03

## 2024-05-17 RX ADMIN — DONEPEZIL HYDROCHLORIDE 10 MG: 10 TABLET, FILM COATED ORAL at 22:04

## 2024-05-17 RX ADMIN — LEVOTHYROXINE SODIUM 100 MCG: 0.1 TABLET ORAL at 06:26

## 2024-05-17 ASSESSMENT — PAIN SCALES - WONG BAKER
WONGBAKER_NUMERICALRESPONSE: NO HURT

## 2024-05-17 NOTE — PROGRESS NOTES
[facility]  OCCUPATIONAL THERAPY MISSED TREATMENT NOTE  [facility] SURG 8AB  8A-15/015-A      Date: 2024  Patient Name: Harsha Jacobson        CSN: [id number]   : 1940  (84 y.o.)  Gender: male                REASON FOR MISSED TREATMENT:  Pt not arousable to therapeutic level. Opens eyes briefly but does not respond to verbal questions. Will check back when more appropriate. Per nursing, Pt did not sleep well last night and was agitated.

## 2024-05-17 NOTE — PLAN OF CARE
Problem: Discharge Planning  Goal: Discharge to home or other facility with appropriate resources  Outcome: Progressing  Flowsheets (Taken 5/17/2024 0400)  Discharge to home or other facility with appropriate resources:   Identify barriers to discharge with patient and caregiver   Arrange for needed discharge resources and transportation as appropriate   Identify discharge learning needs (meds, wound care, etc)   Refer to discharge planning if patient needs post-hospital services based on physician order or complex needs related to functional status, cognitive ability or social support system     Problem: Safety - Adult  Goal: Free from fall injury  Outcome: Progressing  Flowsheets (Taken 5/17/2024 0400)  Free From Fall Injury:   Instruct family/caregiver on patient safety   Based on caregiver fall risk screen, instruct family/caregiver to ask for assistance with transferring infant if caregiver noted to have fall risk factors     Problem: ABCDS Injury Assessment  Goal: Absence of physical injury  Outcome: Progressing  Flowsheets (Taken 5/17/2024 0400)  Absence of Physical Injury: Implement safety measures based on patient assessment     Problem: Pain  Goal: Verbalizes/displays adequate comfort level or baseline comfort level  Outcome: Progressing  Flowsheets (Taken 5/17/2024 0400)  Verbalizes/displays adequate comfort level or baseline comfort level:   Encourage patient to monitor pain and request assistance   Assess pain using appropriate pain scale   Administer analgesics based on type and severity of pain and evaluate response   Implement non-pharmacological measures as appropriate and evaluate response   Notify Licensed Independent Practitioner if interventions unsuccessful or patient reports new pain     Problem: Chronic Conditions and Co-morbidities  Goal: Patient's chronic conditions and co-morbidity symptoms are monitored and maintained or improved  Outcome: Progressing  Flowsheets (Taken 5/17/2024

## 2024-05-17 NOTE — PROGRESS NOTES
Cardiology Progress Note      Patient:  Harsha Jacobson  YOB: 1940  MRN: [id number]   Acct: [id number]  Admit Date:  5/15/2024  Primary Cardiologist: Reva Combs MD---> new pt to [facility]   Seen by Dr. Berrios    Per prior cardiology consult note-  CHIEF COMPLAINT:        HPI: This is a pleasant 84 y.o. male presents with chief complaint of chest pain.  Chest pain is reproducible with movement of right arm and is described as sharp in nature.  Patient says this been ongoing for couple weeks and is worse when he is standing up out of chairs and beds.  He says pain happens once or twice a day.  He was able to move his arm around and reproduce the pain for me point to the exact location just to the right of his sternum where the pain is worst.  He denies any associated shortness of breath, radiation of the pain to the arm or jaw, nausea, or vomiting.      Subjective (Events in last 24 hours):   pt in bed   Eyes closed - called name - he speaks without opening eyes - he is alert and oriented - can answer my questions - doesn't now date but knows in hospital     Denies chest pain or SOB   Denies abd pain    When RT lateral chest armpit area) palpated - he screams ot \"ouch\"  No pain anywhere else     Tele SR   VSS    Objective:   /63   Pulse 89   Temp 98.6 °F (37 °C) (Oral)   Resp 15   Ht 1.702 m (5' 7\")   Wt 88 kg (193 lb 14.4 oz)   SpO2 90%   BMI 30.37 kg/m²        TELEMETRY: SR no ectopy     Physical Exam:  General Appearance: alert and oriented to person-  in no acute distress  Cardiovascular: normal rate, regular rhythm, normal S1 and S2, no murmurs, rubs, clicks, or gallops, distal pulses intact,   Pulmonary/Chest: clear to auscultation bilaterally- no wheezes, rales or rhonchi, normal air movement, no respiratory distress  Abdomen: soft, non-tender, non-distended, normal bowel sounds, no masses Extremities: no cyanosis, clubbing or edema, pulses present    Skin: warm and dry, no rash or  3.3 05/17/2024 07:30 AM     05/17/2024 07:30 AM    CO2 26 05/17/2024 07:30 AM    BUN 10 05/17/2024 07:30 AM    CREATININE 0.8 05/17/2024 07:30 AM    AGRATIO 1.5 02/28/2020 11:04 AM    LABGLOM 86 05/17/2024 07:30 AM    LABGLOM 83 04/22/2024 11:02 AM    GLUCOSE 113 05/17/2024 07:30 AM    GLUCOSE 127 08/18/2020 08:58 AM    CALCIUM 9.1 05/17/2024 07:30 AM       Hepatic Function Panel:    Lab Results   Component Value Date/Time    ALKPHOS 97 08/07/2023 01:25 PM    ALT 8 08/07/2023 01:25 PM    AST 12 08/07/2023 01:25 PM    BILITOT 0.7 08/07/2023 01:25 PM    BILIDIR <0.2 08/07/2023 01:25 PM       Magnesium:    Lab Results   Component Value Date/Time    MG 2.1 05/17/2024 07:30 AM       PT/INR:    Lab Results   Component Value Date/Time    INR 0.87 05/16/2024 07:07 AM       HgBA1c:    Lab Results   Component Value Date/Time    LABA1C 8.6 04/22/2024 11:02 AM       FLP:    Lab Results   Component Value Date/Time    TRIG 138 08/07/2023 01:25 PM    HDL 52 08/07/2023 01:25 PM    LDLDIRECT 44 08/18/2020 08:58 AM       TSH:    Lab Results   Component Value Date/Time    TSH 0.589 07/10/2023 11:46 AM         Assessment:    Muscular Chest pain with movement to RT armpit area       Hx  Anemia / ?GIB   dementia  HTN - stable   HLP LDL 66  8/2023  Prior CAD - moderate disease - treated medically       Plan:  Treat medically for muscular chest pain   Continue current cardiac regimen   Follow as prior scheduled   Cards is signing off - call for questions or concerns          Electronically signed by DIOGO Baird CNP on 5/17/2024 at 3:11 PM

## 2024-05-17 NOTE — PROGRESS NOTES
This nurse held patients morning medication d/ t pt not arousal to therapeutic level.  Pt. Was given 2 doses of Zyprexa 5mg last night and did sleep well until about 5 am this morning. Pt. Opens eyes briefly but is not responded to verbal cues. This nurse feels it is a safety concern to have patient swallow medication at this time.

## 2024-05-17 NOTE — ED PROVIDER NOTES
[facility] MED SURG 8AB      CHIEF COMPLAINT       Chief Complaint   Patient presents with    Chest Pain       Nurses Notes reviewed and I agree except as noted in the HPI.      HISTORY OF PRESENT ILLNESS    Harsha Jacobson is a 84 y.o. male who presents with complaint of chest pain from the nursing home, however, on questioning in the ED during initial contact, patient declines chest pain.  Onset: Acute  Duration: Resolved  Timing: Resolved  Location of Pain: Resolved chest pain  Intesity/severity: No pain  Modifying Factors: None  Relieved by;  Previous Episodes;  Tx Before arrival: None    PAST MEDICAL HISTORY    has a past medical history of Arthritis, CAD (coronary artery disease), Depression, Diabetes mellitus (HCC), GERD (gastroesophageal reflux disease), Hyperlipidemia, and Thyroid disease.    SURGICAL HISTORY      has a past surgical history that includes Cholecystectomy; Carpal tunnel release (Right, 1970); Tooth Extraction (1970); Rotator cuff repair (Left, 1998); Retinopathy surgery (Bilateral, 1997); Wound debridement (Left, 12/2003); cardiovascular stress test (08/2015); Colonoscopy; Upper gastrointestinal endoscopy (2012); Nerve Block Lumb Facet Level 1 Bilateral (Bilateral, 11/27/2017); Nerve Surgery (Bilateral, 02/06/2018); pr njx dx/ther agt pvrt facet jt lmbr/sac 1 level (Bilateral, 2/6/2018); other surgical history (Right, 03/05/2018); pr unlisted procedure nervous system (Right, 3/5/2018); other surgical history (Left, 03/27/2018); pr unlisted procedure nervous system (Left, 3/27/2018); eye surgery (2005); back surgery (04/1991); Cardiac catheterization (12/2005); Cardiac catheterization (07/11/2018); pr njx dx/ther sbst intrlmnr lmbr/sac w/img gdn (N/A, 7/19/2018); pr office/outpt visit,procedure only (N/A, 10/12/2018); and IMPLANTATION VAGAL NERVE STIMULATOR (N/A, 12/7/2018).    CURRENT MEDICATIONS       Current Discharge Medication List        CONTINUE these medications which have NOT CHANGED     no administration in time range)   polyethylene glycol (GLYCOLAX) packet 17 g (has no administration in time range)   acetaminophen (TYLENOL) tablet 650 mg (has no administration in time range)     Or   acetaminophen (TYLENOL) suppository 650 mg (has no administration in time range)   aspirin chewable tablet 81 mg (81 mg Oral Given 5/16/24 0841)   citalopram (CELEXA) tablet 20 mg (20 mg Oral Given 5/16/24 0841)   clopidogrel (PLAVIX) tablet 75 mg (75 mg Oral Given 5/16/24 0841)   donepezil (ARICEPT) tablet 10 mg (10 mg Oral Given 5/16/24 2029)   ferrous sulfate (IRON 325) tablet 325 mg (325 mg Oral Given 5/16/24 0840)   fludrocortisone (FLORINEF) tablet 0.1 mg (0.1 mg Oral Given 5/16/24 0843)   insulin glargine (LANTUS) injection vial 26 Units (26 Units SubCUTAneous Given 5/16/24 0841)   insulin lispro (HUMALOG,ADMELOG) injection vial 5 Units (5 Units SubCUTAneous Not Given 5/16/24 1613)   empagliflozin (JARDIANCE) tablet 10 mg (10 mg Oral Given 5/16/24 0840)   metoprolol succinate (TOPROL XL) extended release tablet 50 mg (50 mg Oral Given 5/16/24 0840)   midodrine (PROAMATINE) tablet 10 mg (10 mg Oral Given 5/16/24 1613)   pantoprazole (PROTONIX) tablet 40 mg (40 mg Oral Given 5/16/24 0715)   rosuvastatin (CRESTOR) tablet 10 mg (10 mg Oral Given 5/16/24 2029)   levothyroxine (SYNTHROID) tablet 100 mcg (100 mcg Oral Not Given 5/16/24 0705)   glucose chewable tablet 16 g (has no administration in time range)   dextrose bolus 10% 125 mL (has no administration in time range)     Or   dextrose bolus 10% 250 mL (has no administration in time range)   glucagon injection 1 mg (has no administration in time range)   dextrose 10 % infusion (has no administration in time range)   aspirin chewable tablet 324 mg (324 mg Oral Given 5/16/24 0018)   heparin (porcine) injection 4,000 Units (4,000 Units IntraVENous Given 5/16/24 0744)   OLANZapine (ZyPREXA) 5 mg in sterile water 1 mL injection (5 mg IntraMUSCular Given 5/16/24

## 2024-05-17 NOTE — PROGRESS NOTES
Hospitalist Progress Note    Patient:  Harsha Jacobson      Unit/Bed:8A-15/015-A    YOB: 1940    MRN: [id number]       Acct: [id number]     PCP: Yo Thomas MD    Date of Admission: 5/15/2024    Assessment/Plan:    1.Elevated troponin in patient with known CAD:      Patient on Heparin drip     Serial troponin 34,41,34              Continue Rosuvastatin, ASA,Metoprolol, Clopidogrel      Patient was evaluated by cardiology recommended medical management appreciate input  ECHO showed: Left Ventricle: Normal left ventricular systolic function with a visually estimated EF of 55 - 60%. Not well visualized. Left ventricle size is normal. Normal wall thickness.    Mitral Valve: Not well visualized. Mildly thickened leaflet.    2. Coronary Artery Disease               Continue Rosuvastatin, ASA, Metoprolol,Clopidogrel                3. Hyperlipidemia              Continue Rosuvastatin     4. IDDM type II:   Continue long acting and short acting insulin coverage  Continue Jardiance  Continue hypoglycemic protocol   Monitor glucose   ADA diet               4.Thyroid disease           Patient on Levothyroxine               5. GERD continue Protonix               6. Cognitive impairment/dementia:                Continue Donepezil    patient with confusion.  Monitor cognition  Supportive care            7. Depression          Continue Citalopram     Anticipated Discharge in : TBD    Active Hospital Problems    Diagnosis Date Noted    Elevated troponin [R79.89] 05/16/2024    Chest pain [R07.9] 05/16/2024    Arteriosclerosis of coronary artery [I25.10] 04/08/2022    Type 2 diabetes mellitus with neurologic complication (HCC) [E11.49] 12/03/2020             Chief Complaint:  chest pain       Hospital Course: HPI: Patient is transferred to the emergency department from skilled nursing facility for complaint of chest pain.  Patient is really unable to provide any details regarding the chest pain due to

## 2024-05-17 NOTE — PROGRESS NOTES
[facility]  PHYSICAL THERAPY MISSED TREATMENT NOTE  [facility] MED SURG 8AB    Date: 2024  Patient Name: Harsha Jacobson        MRN: [id number]   : 1940  (84 y.o.)  Gender: male   Referring Practitioner: Leandra Ward APRN - CNP  Diagnosis: Elevated troponin         REASON FOR MISSED TREATMENT:  RN clearance to see pt this date. Pt unarousable to safe therapeutic level. Reports of decreased sleep throughout the night and increased agitation. PT to reattempt at a later time as able.

## 2024-05-18 LAB
ANION GAP SERPL CALC-SCNC: 15 MEQ/L (ref 8–16)
BASOPHILS ABSOLUTE: 0 THOU/MM3 (ref 0–0.1)
BASOPHILS NFR BLD AUTO: 0.3 %
BUN SERPL-MCNC: 19 MG/DL (ref 7–22)
CALCIUM SERPL-MCNC: 8.8 MG/DL (ref 8.5–10.5)
CHLORIDE SERPL-SCNC: 106 MEQ/L (ref 98–111)
CO2 SERPL-SCNC: 23 MEQ/L (ref 23–33)
CREAT SERPL-MCNC: 1.1 MG/DL (ref 0.4–1.2)
DEPRECATED RDW RBC AUTO: 50.9 FL (ref 35–45)
EOSINOPHIL NFR BLD AUTO: 1.7 %
EOSINOPHILS ABSOLUTE: 0.2 THOU/MM3 (ref 0–0.4)
ERYTHROCYTE [DISTWIDTH] IN BLOOD BY AUTOMATED COUNT: 14.6 % (ref 11.5–14.5)
GFR SERPL CREATININE-BSD FRML MDRD: 65 ML/MIN/1.73M2
GLUCOSE BLD STRIP.AUTO-MCNC: 144 MG/DL (ref 70–108)
GLUCOSE BLD STRIP.AUTO-MCNC: 192 MG/DL (ref 70–108)
GLUCOSE BLD STRIP.AUTO-MCNC: 209 MG/DL (ref 70–108)
GLUCOSE SERPL-MCNC: 128 MG/DL (ref 70–108)
HCT VFR BLD AUTO: 44.5 % (ref 42–52)
HEPARIN UNFRACTIONATED: < 0.04 U/ML (ref 0.3–0.7)
HGB BLD-MCNC: 13.9 GM/DL (ref 14–18)
IMM GRANULOCYTES # BLD AUTO: 0.04 THOU/MM3 (ref 0–0.07)
IMM GRANULOCYTES NFR BLD AUTO: 0.4 %
LYMPHOCYTES ABSOLUTE: 1 THOU/MM3 (ref 1–4.8)
LYMPHOCYTES NFR BLD AUTO: 10.8 %
MCH RBC QN AUTO: 29.5 PG (ref 26–33)
MCHC RBC AUTO-ENTMCNC: 31.2 GM/DL (ref 32.2–35.5)
MCV RBC AUTO: 94.5 FL (ref 80–94)
MONOCYTES ABSOLUTE: 1 THOU/MM3 (ref 0.4–1.3)
MONOCYTES NFR BLD AUTO: 10.9 %
NEUTROPHILS ABSOLUTE: 7 THOU/MM3 (ref 1.8–7.7)
NEUTROPHILS NFR BLD AUTO: 75.9 %
NRBC BLD AUTO-RTO: 0 /100 WBC
PLATELET # BLD AUTO: 186 THOU/MM3 (ref 130–400)
PMV BLD AUTO: 11.9 FL (ref 9.4–12.4)
POTASSIUM SERPL-SCNC: 4 MEQ/L (ref 3.5–5.2)
RBC # BLD AUTO: 4.71 MILL/MM3 (ref 4.7–6.1)
SODIUM SERPL-SCNC: 144 MEQ/L (ref 135–145)
WBC # BLD AUTO: 9.2 THOU/MM3 (ref 4.8–10.8)

## 2024-05-18 PROCEDURE — 82948 REAGENT STRIP/BLOOD GLUCOSE: CPT

## 2024-05-18 PROCEDURE — 6370000000 HC RX 637 (ALT 250 FOR IP): Performed by: PHYSICIAN ASSISTANT

## 2024-05-18 PROCEDURE — 85520 HEPARIN ASSAY: CPT

## 2024-05-18 PROCEDURE — 1200000000 HC SEMI PRIVATE

## 2024-05-18 PROCEDURE — 2580000003 HC RX 258: Performed by: PHYSICIAN ASSISTANT

## 2024-05-18 PROCEDURE — 1200000003 HC TELEMETRY R&B

## 2024-05-18 PROCEDURE — 80048 BASIC METABOLIC PNL TOTAL CA: CPT

## 2024-05-18 PROCEDURE — 85025 COMPLETE CBC W/AUTO DIFF WBC: CPT

## 2024-05-18 PROCEDURE — 36415 COLL VENOUS BLD VENIPUNCTURE: CPT

## 2024-05-18 PROCEDURE — 99232 SBSQ HOSP IP/OBS MODERATE 35: CPT | Performed by: NURSE PRACTITIONER

## 2024-05-18 RX ADMIN — ASPIRIN 81 MG 81 MG: 81 TABLET ORAL at 09:31

## 2024-05-18 RX ADMIN — SODIUM CHLORIDE, PRESERVATIVE FREE 10 ML: 5 INJECTION INTRAVENOUS at 20:04

## 2024-05-18 RX ADMIN — FLUDROCORTISONE ACETATE 0.1 MG: 0.1 TABLET ORAL at 09:31

## 2024-05-18 RX ADMIN — CLOPIDOGREL BISULFATE 75 MG: 75 TABLET ORAL at 09:31

## 2024-05-18 RX ADMIN — FERROUS SULFATE TAB 325 MG (65 MG ELEMENTAL FE) 325 MG: 325 (65 FE) TAB at 09:31

## 2024-05-18 RX ADMIN — MIDODRINE HYDROCHLORIDE 10 MG: 10 TABLET ORAL at 13:43

## 2024-05-18 RX ADMIN — METOPROLOL SUCCINATE 50 MG: 50 TABLET, EXTENDED RELEASE ORAL at 09:29

## 2024-05-18 RX ADMIN — MIDODRINE HYDROCHLORIDE 10 MG: 10 TABLET ORAL at 17:51

## 2024-05-18 RX ADMIN — EMPAGLIFLOZIN 10 MG: 10 TABLET, FILM COATED ORAL at 09:31

## 2024-05-18 RX ADMIN — ACETAMINOPHEN 650 MG: 325 TABLET ORAL at 20:05

## 2024-05-18 RX ADMIN — CITALOPRAM HYDROBROMIDE 20 MG: 20 TABLET ORAL at 09:29

## 2024-05-18 RX ADMIN — DONEPEZIL HYDROCHLORIDE 10 MG: 10 TABLET, FILM COATED ORAL at 20:03

## 2024-05-18 RX ADMIN — PANTOPRAZOLE SODIUM 40 MG: 40 TABLET, DELAYED RELEASE ORAL at 06:58

## 2024-05-18 RX ADMIN — ROSUVASTATIN 10 MG: 10 TABLET, FILM COATED ORAL at 20:03

## 2024-05-18 RX ADMIN — LEVOTHYROXINE SODIUM 100 MCG: 0.1 TABLET ORAL at 06:58

## 2024-05-18 RX ADMIN — ACETAMINOPHEN 650 MG: 325 TABLET ORAL at 09:31

## 2024-05-18 ASSESSMENT — PAIN SCALES - WONG BAKER

## 2024-05-18 ASSESSMENT — PAIN - FUNCTIONAL ASSESSMENT
PAIN_FUNCTIONAL_ASSESSMENT: PREVENTS OR INTERFERES SOME ACTIVE ACTIVITIES AND ADLS
PAIN_FUNCTIONAL_ASSESSMENT: PREVENTS OR INTERFERES SOME ACTIVE ACTIVITIES AND ADLS

## 2024-05-18 ASSESSMENT — PAIN DESCRIPTION - ORIENTATION: ORIENTATION: OTHER (COMMENT)

## 2024-05-18 ASSESSMENT — PAIN SCALES - GENERAL
PAINLEVEL_OUTOF10: 3
PAINLEVEL_OUTOF10: 0

## 2024-05-18 ASSESSMENT — PAIN DESCRIPTION - LOCATION: LOCATION: OTHER (COMMENT)

## 2024-05-18 ASSESSMENT — PAIN DESCRIPTION - DESCRIPTORS: DESCRIPTORS: ACHING

## 2024-05-18 NOTE — PROGRESS NOTES
Hospitalist Progress Note    Patient:  Harsha Jacobson      Unit/Bed:8A-15/015-A    YOB: 1940    MRN: [id number]       Acct: [id number]     PCP: Yo Thomas MD    Date of Admission: 5/15/2024    Assessment/Plan:    1.Elevated troponin in patient with known CAD:      Patient on Heparin drip     Serial troponin 34,41,34              Continue Rosuvastatin, ASA,Metoprolol, Clopidogrel      Patient was evaluated by cardiology recommended medical management appreciate input  ECHO showed: Left Ventricle: Normal left ventricular systolic function with a visually estimated EF of 55 - 60%. Not well visualized. Left ventricle size is normal. Normal wall thickness.    Mitral Valve: Not well visualized. Mildly thickened leaflet.    2. Coronary Artery Disease               Continue Rosuvastatin, ASA, Metoprolol,Clopidogrel                3. Hyperlipidemia              Continue Rosuvastatin     4. IDDM type II:   Continue long acting and short acting insulin coverage  Continue Jardiance  Continue hypoglycemic protocol   Monitor glucose   ADA diet               4.Thyroid disease           Patient on Levothyroxine               5. GERD continue Protonix               6. Cognitive impairment/dementia:                Continue Donepezil    patient with confusion.  Monitor cognition  Supportive care            7. Depression          Continue Citalopram     Anticipated Discharge in : TBD    Active Hospital Problems    Diagnosis Date Noted    Hyperlipidemia [E78.5] 05/17/2024    Elevated troponin [R79.89] 05/16/2024    Chest pain [R07.9] 05/16/2024    Arteriosclerosis of coronary artery [I25.10] 04/08/2022    Thyroid disease [E07.9] 04/08/2022    Type 2 diabetes mellitus without complication, with long-term current use of insulin (HCC) [E11.9, Z79.4] 12/03/2020             Chief Complaint:  chest pain       Hospital Course: HPI: Patient is transferred to the emergency department from skilled nursing facility for  (rDNA), dextrose      Intake/Output Summary (Last 24 hours) at 5/18/2024 1136  Last data filed at 5/17/2024 2339  Gross per 24 hour   Intake 60 ml   Output 800 ml   Net -740 ml         Diet:  ADULT DIET; Regular    Exam:  /60   Pulse (!) 111   Temp 98.3 °F (36.8 °C) (Oral)   Resp 18   Ht 1.702 m (5' 7\")   Wt 88 kg (193 lb 14.4 oz)   SpO2 96%   BMI 30.37 kg/m²     General appearance: sleepy  awakens on command No apparent distress, appears stated age and cooperative.  HEENT: Pupils equal, round, and reactive to light. Conjunctivae/corneas clear.  Neck: Supple, with full range of motion. No jugular venous distention. Trachea midline.  Respiratory:  Normal respiratory effort. Clear to auscultation, bilaterally without Rales/Wheezes/Rhonchi.  Cardiovascular: Regular rate and rhythm with normal S1/S2 without murmurs, rubs or gallops.  Abdomen: Soft, non-tender, non-distended with normal bowel sounds.  Musculoskeletal: passive and active ROM x 4 extremities.  Skin: Skin color, texture, turgor normal.  No rashes or lesions.  Neurologic:  alert with confusion   Psychiatric: sleepy awakens on command  thought content inappropriate  Capillary Refill: Brisk,< 3 seconds   Peripheral Pulses: +2 palpable, equal bilaterally       Labs:   Recent Labs     05/15/24  2117 05/17/24  0730 05/18/24  0619   WBC 7.7 7.2 9.2   HGB 12.9* 13.2* 13.9*   HCT 40.3* 41.0* 44.5    182 186       Recent Labs     05/15/24  2117 05/17/24  0730 05/18/24  0619    145 144   K 4.6 3.3* 4.0    107 106   CO2 24 26 23   BUN 20 10 19   CREATININE 1.1 0.8 1.1   CALCIUM 9.0 9.1 8.8       No results for input(s): \"AST\", \"ALT\", \"BILIDIR\", \"BILITOT\", \"ALKPHOS\" in the last 72 hours.  Recent Labs     05/16/24  0707   INR 0.87       No results for input(s): \"CKTOTAL\", \"TROPONINI\" in the last 72 hours.    Urinalysis:      Lab Results   Component Value Date/Time    NITRU NEGATIVE 11/30/2020 07:50 PM    BACTERIA FEW 10/01/2011 03:05 AM

## 2024-05-18 NOTE — PLAN OF CARE
Care plan reviewed with patient and wife (via phone).  Patient and wife (via phone) verbalize understanding of the plan of care and contribute to goal setting.       Problem: Discharge Planning  Goal: Discharge to home or other facility with appropriate resources  5/18/2024 1514 by Cecilia Higgins RN  Outcome: Progressing  Flowsheets (Taken 5/18/2024 1514)  Discharge to home or other facility with appropriate resources: Arrange for needed discharge resources and transportation as appropriate  Note: Discharge to SNF  Problem: Safety - Adult  Goal: Free from fall injury  5/18/2024 1514 by Cecilia Higgins RN  Outcome: Progressing  Flowsheets (Taken 5/18/2024 1514)  Free From Fall Injury: Instruct family/caregiver on patient safety  Note: Patient absent of falls during this admission. Falling star program in place. Bedside table and call light within reach      Problem: ABCDS Injury Assessment  Goal: Absence of physical injury  5/18/2024 1514 by Cecilia Higgins RN  Outcome: Progressing  Flowsheets (Taken 5/18/2024 1514)  Absence of Physical Injury: Implement safety measures based on patient assessment  Note: Patient absent of falls during this admission. Falling star program in place. Bedside table and call light within reach        Problem: Skin/Tissue Integrity  Goal: Absence of new skin breakdown  Description: 1.  Monitor for areas of redness and/or skin breakdown  2.  Assess vascular access sites hourly  3.  Every 4-6 hours minimum:  Change oxygen saturation probe site  4.  Every 4-6 hours:  If on nasal continuous positive airway pressure, respiratory therapy assess nares and determine need for appliance change or resting period.  Outcome: Progressing  Note: No skin break down during this admission. Turning the patient frequently. Educating on the need to keep skin clean and dry to prevent skin break down       Problem: Pain  Goal: Verbalizes/displays adequate comfort level or baseline comfort  level  5/18/2024 1514 by Cecilia Higgins, RN  Outcome: Progressing  Flowsheets (Taken 5/18/2024 1514)  Verbalizes/displays adequate comfort level or baseline comfort level: Encourage patient to monitor pain and request assistance  Note: Pain goal of 0/10. RN will use non-pharmacological interventions before administering ordered pain medications.        Problem: Chronic Conditions and Co-morbidities  Goal: Patient's chronic conditions and co-morbidity symptoms are monitored and maintained or improved  5/18/2024 1514 by Cecilia Higgins, RN  Outcome: Progressing  Flowsheets (Taken 5/18/2024 1514)  Care Plan - Patient's Chronic Conditions and Co-Morbidity Symptoms are Monitored and Maintained or Improved: Monitor and assess patient's chronic conditions and comorbid symptoms for stability, deterioration, or improvement  Note: Chronic and comorbidities managed.

## 2024-05-19 LAB
ANION GAP SERPL CALC-SCNC: 15 MEQ/L (ref 8–16)
BASOPHILS ABSOLUTE: 0 THOU/MM3 (ref 0–0.1)
BASOPHILS NFR BLD AUTO: 0.5 %
BUN SERPL-MCNC: 24 MG/DL (ref 7–22)
CALCIUM SERPL-MCNC: 8.9 MG/DL (ref 8.5–10.5)
CHLORIDE SERPL-SCNC: 105 MEQ/L (ref 98–111)
CO2 SERPL-SCNC: 23 MEQ/L (ref 23–33)
CREAT SERPL-MCNC: 1 MG/DL (ref 0.4–1.2)
DEPRECATED RDW RBC AUTO: 51.1 FL (ref 35–45)
EOSINOPHIL NFR BLD AUTO: 2 %
EOSINOPHILS ABSOLUTE: 0.2 THOU/MM3 (ref 0–0.4)
ERYTHROCYTE [DISTWIDTH] IN BLOOD BY AUTOMATED COUNT: 14.7 % (ref 11.5–14.5)
GFR SERPL CREATININE-BSD FRML MDRD: 73 ML/MIN/1.73M2
GLUCOSE BLD STRIP.AUTO-MCNC: 128 MG/DL (ref 70–108)
GLUCOSE BLD STRIP.AUTO-MCNC: 143 MG/DL (ref 70–108)
GLUCOSE BLD STRIP.AUTO-MCNC: 154 MG/DL (ref 70–108)
GLUCOSE BLD STRIP.AUTO-MCNC: 163 MG/DL (ref 70–108)
GLUCOSE SERPL-MCNC: 172 MG/DL (ref 70–108)
HCT VFR BLD AUTO: 43.1 % (ref 42–52)
HEPARIN UNFRACTIONATED: < 0.04 U/ML (ref 0.3–0.7)
HGB BLD-MCNC: 13.4 GM/DL (ref 14–18)
IMM GRANULOCYTES # BLD AUTO: 0.04 THOU/MM3 (ref 0–0.07)
IMM GRANULOCYTES NFR BLD AUTO: 0.5 %
LYMPHOCYTES ABSOLUTE: 1 THOU/MM3 (ref 1–4.8)
LYMPHOCYTES NFR BLD AUTO: 12.5 %
MCH RBC QN AUTO: 29.5 PG (ref 26–33)
MCHC RBC AUTO-ENTMCNC: 31.1 GM/DL (ref 32.2–35.5)
MCV RBC AUTO: 94.7 FL (ref 80–94)
MONOCYTES ABSOLUTE: 1 THOU/MM3 (ref 0.4–1.3)
MONOCYTES NFR BLD AUTO: 11.9 %
NEUTROPHILS ABSOLUTE: 5.8 THOU/MM3 (ref 1.8–7.7)
NEUTROPHILS NFR BLD AUTO: 72.6 %
NRBC BLD AUTO-RTO: 0 /100 WBC
PLATELET # BLD AUTO: 168 THOU/MM3 (ref 130–400)
PMV BLD AUTO: 11.2 FL (ref 9.4–12.4)
POTASSIUM SERPL-SCNC: 3.9 MEQ/L (ref 3.5–5.2)
RBC # BLD AUTO: 4.55 MILL/MM3 (ref 4.7–6.1)
SODIUM SERPL-SCNC: 143 MEQ/L (ref 135–145)
WBC # BLD AUTO: 8 THOU/MM3 (ref 4.8–10.8)

## 2024-05-19 PROCEDURE — 97162 PT EVAL MOD COMPLEX 30 MIN: CPT

## 2024-05-19 PROCEDURE — 85025 COMPLETE CBC W/AUTO DIFF WBC: CPT

## 2024-05-19 PROCEDURE — 6360000002 HC RX W HCPCS

## 2024-05-19 PROCEDURE — 6370000000 HC RX 637 (ALT 250 FOR IP): Performed by: PHYSICIAN ASSISTANT

## 2024-05-19 PROCEDURE — 97110 THERAPEUTIC EXERCISES: CPT

## 2024-05-19 PROCEDURE — 82948 REAGENT STRIP/BLOOD GLUCOSE: CPT

## 2024-05-19 PROCEDURE — 99232 SBSQ HOSP IP/OBS MODERATE 35: CPT | Performed by: NURSE PRACTITIONER

## 2024-05-19 PROCEDURE — 80048 BASIC METABOLIC PNL TOTAL CA: CPT

## 2024-05-19 PROCEDURE — 1200000003 HC TELEMETRY R&B

## 2024-05-19 PROCEDURE — 97530 THERAPEUTIC ACTIVITIES: CPT

## 2024-05-19 PROCEDURE — 85520 HEPARIN ASSAY: CPT

## 2024-05-19 PROCEDURE — 2580000003 HC RX 258: Performed by: PHYSICIAN ASSISTANT

## 2024-05-19 PROCEDURE — 1200000000 HC SEMI PRIVATE

## 2024-05-19 PROCEDURE — 36415 COLL VENOUS BLD VENIPUNCTURE: CPT

## 2024-05-19 RX ORDER — HALOPERIDOL 5 MG/ML
5 INJECTION INTRAMUSCULAR ONCE
Status: COMPLETED | OUTPATIENT
Start: 2024-05-19 | End: 2024-05-19

## 2024-05-19 RX ADMIN — CITALOPRAM HYDROBROMIDE 20 MG: 20 TABLET ORAL at 10:00

## 2024-05-19 RX ADMIN — DONEPEZIL HYDROCHLORIDE 10 MG: 10 TABLET, FILM COATED ORAL at 20:00

## 2024-05-19 RX ADMIN — SODIUM CHLORIDE, PRESERVATIVE FREE 10 ML: 5 INJECTION INTRAVENOUS at 10:02

## 2024-05-19 RX ADMIN — CLOPIDOGREL BISULFATE 75 MG: 75 TABLET ORAL at 10:00

## 2024-05-19 RX ADMIN — ACETAMINOPHEN 650 MG: 325 TABLET ORAL at 03:17

## 2024-05-19 RX ADMIN — MIDODRINE HYDROCHLORIDE 10 MG: 10 TABLET ORAL at 13:55

## 2024-05-19 RX ADMIN — FLUDROCORTISONE ACETATE 0.1 MG: 0.1 TABLET ORAL at 10:00

## 2024-05-19 RX ADMIN — SODIUM CHLORIDE, PRESERVATIVE FREE 10 ML: 5 INJECTION INTRAVENOUS at 20:00

## 2024-05-19 RX ADMIN — ACETAMINOPHEN 650 MG: 325 TABLET ORAL at 10:00

## 2024-05-19 RX ADMIN — ROSUVASTATIN 10 MG: 10 TABLET, FILM COATED ORAL at 20:00

## 2024-05-19 RX ADMIN — LEVOTHYROXINE SODIUM 100 MCG: 0.1 TABLET ORAL at 10:00

## 2024-05-19 RX ADMIN — INSULIN GLARGINE 26 UNITS: 100 INJECTION, SOLUTION SUBCUTANEOUS at 10:15

## 2024-05-19 RX ADMIN — INSULIN LISPRO 5 UNITS: 100 INJECTION, SOLUTION INTRAVENOUS; SUBCUTANEOUS at 10:14

## 2024-05-19 RX ADMIN — ACETAMINOPHEN 650 MG: 325 TABLET ORAL at 20:00

## 2024-05-19 RX ADMIN — ASPIRIN 81 MG 81 MG: 81 TABLET ORAL at 10:00

## 2024-05-19 RX ADMIN — EMPAGLIFLOZIN 10 MG: 10 TABLET, FILM COATED ORAL at 10:00

## 2024-05-19 RX ADMIN — METOPROLOL SUCCINATE 50 MG: 50 TABLET, EXTENDED RELEASE ORAL at 10:02

## 2024-05-19 RX ADMIN — HALOPERIDOL LACTATE 5 MG: 5 INJECTION, SOLUTION INTRAMUSCULAR at 21:43

## 2024-05-19 RX ADMIN — PANTOPRAZOLE SODIUM 40 MG: 40 TABLET, DELAYED RELEASE ORAL at 10:00

## 2024-05-19 RX ADMIN — FERROUS SULFATE TAB 325 MG (65 MG ELEMENTAL FE) 325 MG: 325 (65 FE) TAB at 10:00

## 2024-05-19 RX ADMIN — MIDODRINE HYDROCHLORIDE 10 MG: 10 TABLET ORAL at 10:00

## 2024-05-19 RX ADMIN — INSULIN LISPRO 5 UNITS: 100 INJECTION, SOLUTION INTRAVENOUS; SUBCUTANEOUS at 17:35

## 2024-05-19 RX ADMIN — MIDODRINE HYDROCHLORIDE 10 MG: 10 TABLET ORAL at 17:35

## 2024-05-19 ASSESSMENT — PAIN DESCRIPTION - LOCATION: LOCATION: NECK

## 2024-05-19 ASSESSMENT — PAIN SCALES - GENERAL
PAINLEVEL_OUTOF10: 0
PAINLEVEL_OUTOF10: 3
PAINLEVEL_OUTOF10: 0

## 2024-05-19 ASSESSMENT — PAIN DESCRIPTION - DESCRIPTORS: DESCRIPTORS: ACHING

## 2024-05-19 ASSESSMENT — PAIN DESCRIPTION - ORIENTATION: ORIENTATION: MID

## 2024-05-19 ASSESSMENT — PAIN - FUNCTIONAL ASSESSMENT: PAIN_FUNCTIONAL_ASSESSMENT: PREVENTS OR INTERFERES SOME ACTIVE ACTIVITIES AND ADLS

## 2024-05-19 NOTE — PROGRESS NOTES
Hospitalist Progress Note    Patient:  Harsha Jacobson      Unit/Bed:8A-15/015-A    YOB: 1940    MRN: [id number]       Acct: [id number]     PCP: Yo Thomas MD    Date of Admission: 5/15/2024    Assessment/Plan:    1.Elevated troponin in patient with known CAD:      Patient on Heparin drip     Serial troponin 34,41,34              Continue Rosuvastatin, ASA,Metoprolol, Clopidogrel      Patient was evaluated by cardiology recommended medical management appreciate input  ECHO showed: Left Ventricle: Normal left ventricular systolic function with a visually estimated EF of 55 - 60%. Not well visualized. Left ventricle size is normal. Normal wall thickness.    Mitral Valve: Not well visualized. Mildly thickened leaflet.    2. Coronary Artery Disease               Continue Rosuvastatin, ASA, Metoprolol,Clopidogrel                3. Hyperlipidemia              Continue Rosuvastatin     4. IDDM type II:   Continue long acting and short acting insulin coverage  Continue Jardiance  Continue hypoglycemic protocol   Monitor glucose   ADA diet               4.Thyroid disease           Patient on Levothyroxine               5. GERD continue Protonix               6. Cognitive impairment/dementia:                Continue Donepezil    patient with confusion.  Monitor cognition  Supportive care            7. Depression          Continue Citalopram     Anticipated Discharge in : TBD    Active Hospital Problems    Diagnosis Date Noted    Hyperlipidemia [E78.5] 05/17/2024    Elevated troponin [R79.89] 05/16/2024    Chest pain [R07.9] 05/16/2024    Arteriosclerosis of coronary artery [I25.10] 04/08/2022    Thyroid disease [E07.9] 04/08/2022    Type 2 diabetes mellitus without complication, with long-term current use of insulin (HCC) [E11.9, Z79.4] 12/03/2020             Chief Complaint:  chest pain       Hospital Course: HPI: Patient is transferred to the emergency department from skilled nursing facility for  complaint of chest pain.  Patient is really unable to provide any details regarding the chest pain due to dementia.  Patient's EKG is nonacute.  Troponins are found to be elevated.  Patient will be admitted on heparin drip and evaluated by cardiology.            5/17 Patient was evaluated by cardiology recommended medical management appreciate input  ECHO showed: Left Ventricle: Normal left ventricular systolic function with a visually estimated EF of 55 - 60%. Not well visualized. Left ventricle size is normal. Normal wall thickness.    Mitral Valve: Not well visualized. Mildly thickened leaflet.    5/18 patient was resting on the bed was sleepy but woke up on command, patient to work with PT/OT.Sitter by bedside       5/19 patient was resting on the bed was awake alert oriented x2, no agitation noted  Off physical sitter, patient now has tele sitter    Subjective: Patient examined and evaluated was resting on  was awake alert taking breakfast.able to answer some questions and follow commands,Tele sitter on, no agitation.RN in the room care discussed        Medications:  Reviewed    Infusion Medications    sodium chloride      dextrose       Scheduled Medications    sodium chloride flush  5-40 mL IntraVENous 2 times per day    aspirin  81 mg Oral Daily    citalopram  20 mg Oral Daily    clopidogrel  75 mg Oral Daily    donepezil  10 mg Oral Nightly    ferrous sulfate  325 mg Oral Daily with breakfast    fludrocortisone  0.1 mg Oral Daily    insulin glargine  26 Units SubCUTAneous Daily    insulin lispro  5 Units SubCUTAneous BID AC    empagliflozin  10 mg Oral Daily    metoprolol succinate  50 mg Oral Daily    midodrine  10 mg Oral TID WC    pantoprazole  40 mg Oral Daily    rosuvastatin  10 mg Oral Nightly    levothyroxine  100 mcg Oral Daily     PRN Meds: sodium chloride flush, sodium chloride, potassium chloride **OR** potassium alternative oral replacement **OR** potassium chloride, magnesium sulfate,

## 2024-05-19 NOTE — PLAN OF CARE
Problem: Discharge Planning  Goal: Discharge to home or other facility with appropriate resources  5/19/2024 0058 by Keyonna Ramon RN  Outcome: Progressing  Flowsheets (Taken 5/19/2024 0058)  Discharge to home or other facility with appropriate resources:   Identify barriers to discharge with patient and caregiver   Identify discharge learning needs (meds, wound care, etc)     Problem: Safety - Adult  Goal: Free from fall injury  5/19/2024 0058 by Keyonna Ramon RN  Outcome: Progressing  Flowsheets (Taken 5/18/2024 1514 by Cecilia Higgins, RN)  Free From Fall Injury: Instruct family/caregiver on patient safety     Problem: ABCDS Injury Assessment  Goal: Absence of physical injury  5/19/2024 0058 by Keyonna Ramon RN  Outcome: Progressing  Flowsheets (Taken 5/19/2024 0058)  Absence of Physical Injury: Implement safety measures based on patient assessment     Problem: Skin/Tissue Integrity  Goal: Absence of new skin breakdown  Description: 1.  Monitor for areas of redness and/or skin breakdown  2.  Assess vascular access sites hourly  3.  Every 4-6 hours minimum:  Change oxygen saturation probe site  4.  Every 4-6 hours:  If on nasal continuous positive airway pressure, respiratory therapy assess nares and determine need for appliance change or resting period.  5/19/2024 0058 by Keyonna Ramon RN  Outcome: Progressing  Note: Continued assessing for new skin breakdown       Problem: Pain  Goal: Verbalizes/displays adequate comfort level or baseline comfort level  5/19/2024 0058 by Keyonna Ramon RN  Outcome: Progressing  Flowsheets (Taken 5/19/2024 0058)  Verbalizes/displays adequate comfort level or baseline comfort level: Encourage patient to monitor pain and request assistance     Problem: Chronic Conditions and Co-morbidities  Goal: Patient's chronic conditions and co-morbidity symptoms are monitored and maintained or improved  5/19/2024 0058 by Keyonna Ramon RN  Outcome: Progressing  Flowsheets

## 2024-05-19 NOTE — PROGRESS NOTES
[facility]  INPATIENT PHYSICAL THERAPY  EVALUATION  STRZ MED SURG 8AB - 8A-15/015-A    Time In: 1342  Time Out: 1413  Timed Code Treatment Minutes: 23 Minutes  Minutes: 31          Date: 2024  Patient Name: Harsha Jacobson,  Gender:  male        MRN: [id number]  : 1940  (84 y.o.)      Referring Practitioner: Leandra Ward APRN - CNP  Diagnosis: Elevated troponin  Additional Pertinent Hx: From H&P:  Patient is transferred to the emergency department from skilled nursing facility for complaint of chest pain.  Patient is really unable to provide any details regarding the chest pain due to dementia.  Patient's EKG is nonacute.  Troponins are found to be elevated.  Patient will be admitted on heparin drip and evaluated by cardiology.     Restrictions/Precautions:  Restrictions/Precautions: Fall Risk  Position Activity Restriction  Other position/activity restrictions: tele sitter    Subjective:  Chart Reviewed: Yes  Patient assessed for rehabilitation services?: Yes  Family / Caregiver Present: No  Subjective: RN Okay'd PT eval. Pt in bed upon arrival to room. Pleasantly confused and agreeable to PT eval. Pt perseverating on where his wife is and whether she is okay frequently throughout eval, but redirects easily to task at hand.     General:  Overall Orientation Status: Impaired  Orientation Level: Oriented to person, Disoriented to place, Disoriented to time, Disoriented to situation  Overall Cognitive Status: Exceptions  Vision: Within Functional Limits  Hearing: Within functional limits       Pain: 0/10: denies pain    Vitals: Vitals not assessed per clinical judgement, see nursing flowsheet    Social/Functional History:    Type of Home: Facility  Home Equipment: Walker - Rolling        Ambulation Assistance: Needs assistance  Transfer Assistance: Needs assistance    Active : No     Additional Comments: Pt is very poor historian, follwoing each statement by \"I think, what do I know?\".  Demonstration  Barriers to Learning: Cognition  Education Outcome: Continued education needed       Equipment Recommendations:  Equipment Needed: No    Plan:  Current Treatment Recommendations: Strengthening, ROM, Balance training, Functional mobility training, Transfer training, Gait training, Endurance training, Stair training, Therapeutic activities, Patient/Caregiver education & training, Safety education & training, Home exercise program  General Plan:  (3-5x/wk; GM)    Goals:     Short Term Goals  Time Frame for Short Term Goals: by discharge  Short Term Goal 1: Pt will perform sit<>supine with SBA to progress bed mobility.  Short Term Goal 2: Pt will perform sit<>stand SBA with RW to progress mobility.  Short Term Goal 3: Pt will ambulate >10ft with SBA and RW to access home environment.  Long Term Goals  Time Frame for Long Term Goals : NA d/t short ELOS    Following session, patient left in safe position with all fall risk precautions in place.

## 2024-05-19 NOTE — PLAN OF CARE
Problem: Discharge Planning  Goal: Discharge to home or other facility with appropriate resources  5/19/2024 1340 by Lizet Bahena RN  Outcome: Progressing     Problem: Safety - Adult  Goal: Free from fall injury  5/19/2024 1340 by Lizet Bahena RN  Outcome: Progressing     Problem: ABCDS Injury Assessment  Goal: Absence of physical injury  5/19/2024 1340 by Lizet Bahena RN  Outcome: Progressing     Problem: Skin/Tissue Integrity  Goal: Absence of new skin breakdown  Description: 1.  Monitor for areas of redness and/or skin breakdown  2.  Assess vascular access sites hourly  3.  Every 4-6 hours minimum:  Change oxygen saturation probe site  4.  Every 4-6 hours:  If on nasal continuous positive airway pressure, respiratory therapy assess nares and determine need for appliance change or resting period.  5/19/2024 1340 by Lziet Bahena RN  Outcome: Progressing     Problem: Pain  Goal: Verbalizes/displays adequate comfort level or baseline comfort level  5/19/2024 1340 by Lizet Bahena RN  Outcome: Progressing     Problem: Chronic Conditions and Co-morbidities  Goal: Patient's chronic conditions and co-morbidity symptoms are monitored and maintained or improved  5/19/2024 1340 by Lizet Bahena RN  Outcome: Progressing    Care plan reviewed with patient.  Patient verbalizes understanding of the care plan and contributed to goal setting.

## 2024-05-20 LAB
ANION GAP SERPL CALC-SCNC: 14 MEQ/L (ref 8–16)
BASOPHILS ABSOLUTE: 0 THOU/MM3 (ref 0–0.1)
BASOPHILS NFR BLD AUTO: 0.7 %
BUN SERPL-MCNC: 17 MG/DL (ref 7–22)
CALCIUM SERPL-MCNC: 9.2 MG/DL (ref 8.5–10.5)
CHLORIDE SERPL-SCNC: 104 MEQ/L (ref 98–111)
CO2 SERPL-SCNC: 23 MEQ/L (ref 23–33)
CREAT SERPL-MCNC: 0.7 MG/DL (ref 0.4–1.2)
DEPRECATED RDW RBC AUTO: 48 FL (ref 35–45)
EOSINOPHIL NFR BLD AUTO: 3.4 %
EOSINOPHILS ABSOLUTE: 0.2 THOU/MM3 (ref 0–0.4)
ERYTHROCYTE [DISTWIDTH] IN BLOOD BY AUTOMATED COUNT: 14.2 % (ref 11.5–14.5)
GFR SERPL CREATININE-BSD FRML MDRD: 90 ML/MIN/1.73M2
GLUCOSE BLD STRIP.AUTO-MCNC: 123 MG/DL (ref 70–108)
GLUCOSE BLD STRIP.AUTO-MCNC: 143 MG/DL (ref 70–108)
GLUCOSE BLD STRIP.AUTO-MCNC: 163 MG/DL (ref 70–108)
GLUCOSE BLD STRIP.AUTO-MCNC: 179 MG/DL (ref 70–108)
GLUCOSE SERPL-MCNC: 86 MG/DL (ref 70–108)
HCT VFR BLD AUTO: 42.2 % (ref 42–52)
HGB BLD-MCNC: 13.4 GM/DL (ref 14–18)
IMM GRANULOCYTES # BLD AUTO: 0.03 THOU/MM3 (ref 0–0.07)
IMM GRANULOCYTES NFR BLD AUTO: 0.4 %
LYMPHOCYTES ABSOLUTE: 1.2 THOU/MM3 (ref 1–4.8)
LYMPHOCYTES NFR BLD AUTO: 17.2 %
MAGNESIUM SERPL-MCNC: 2.1 MG/DL (ref 1.6–2.4)
MCH RBC QN AUTO: 29.3 PG (ref 26–33)
MCHC RBC AUTO-ENTMCNC: 31.8 GM/DL (ref 32.2–35.5)
MCV RBC AUTO: 92.3 FL (ref 80–94)
MONOCYTES ABSOLUTE: 0.8 THOU/MM3 (ref 0.4–1.3)
MONOCYTES NFR BLD AUTO: 11.9 %
NEUTROPHILS ABSOLUTE: 4.6 THOU/MM3 (ref 1.8–7.7)
NEUTROPHILS NFR BLD AUTO: 66.4 %
NRBC BLD AUTO-RTO: 0 /100 WBC
PLATELET # BLD AUTO: 196 THOU/MM3 (ref 130–400)
PMV BLD AUTO: 11 FL (ref 9.4–12.4)
POTASSIUM SERPL-SCNC: 3.6 MEQ/L (ref 3.5–5.2)
RBC # BLD AUTO: 4.57 MILL/MM3 (ref 4.7–6.1)
SODIUM SERPL-SCNC: 141 MEQ/L (ref 135–145)
WBC # BLD AUTO: 7 THOU/MM3 (ref 4.8–10.8)

## 2024-05-20 PROCEDURE — 2580000003 HC RX 258: Performed by: PHYSICIAN ASSISTANT

## 2024-05-20 PROCEDURE — 1200000003 HC TELEMETRY R&B

## 2024-05-20 PROCEDURE — 6370000000 HC RX 637 (ALT 250 FOR IP): Performed by: NURSE PRACTITIONER

## 2024-05-20 PROCEDURE — 6360000002 HC RX W HCPCS

## 2024-05-20 PROCEDURE — 97530 THERAPEUTIC ACTIVITIES: CPT

## 2024-05-20 PROCEDURE — 36415 COLL VENOUS BLD VENIPUNCTURE: CPT

## 2024-05-20 PROCEDURE — 82948 REAGENT STRIP/BLOOD GLUCOSE: CPT

## 2024-05-20 PROCEDURE — 80048 BASIC METABOLIC PNL TOTAL CA: CPT

## 2024-05-20 PROCEDURE — 1200000000 HC SEMI PRIVATE

## 2024-05-20 PROCEDURE — 6360000002 HC RX W HCPCS: Performed by: NURSE PRACTITIONER

## 2024-05-20 PROCEDURE — 99232 SBSQ HOSP IP/OBS MODERATE 35: CPT | Performed by: NURSE PRACTITIONER

## 2024-05-20 PROCEDURE — 97166 OT EVAL MOD COMPLEX 45 MIN: CPT

## 2024-05-20 PROCEDURE — 83735 ASSAY OF MAGNESIUM: CPT

## 2024-05-20 PROCEDURE — 6370000000 HC RX 637 (ALT 250 FOR IP): Performed by: PHYSICIAN ASSISTANT

## 2024-05-20 PROCEDURE — 85025 COMPLETE CBC W/AUTO DIFF WBC: CPT

## 2024-05-20 RX ORDER — HALOPERIDOL 5 MG/ML
3 INJECTION INTRAMUSCULAR ONCE
Status: COMPLETED | OUTPATIENT
Start: 2024-05-20 | End: 2024-05-20

## 2024-05-20 RX ORDER — HALOPERIDOL 5 MG/ML
2 INJECTION INTRAMUSCULAR EVERY 6 HOURS PRN
Status: DISCONTINUED | OUTPATIENT
Start: 2024-05-20 | End: 2024-05-20

## 2024-05-20 RX ORDER — HALOPERIDOL 5 MG/ML
5 INJECTION INTRAMUSCULAR EVERY 6 HOURS PRN
Status: DISCONTINUED | OUTPATIENT
Start: 2024-05-20 | End: 2024-05-24

## 2024-05-20 RX ORDER — HYDROXYZINE PAMOATE 25 MG/1
25 CAPSULE ORAL 3 TIMES DAILY PRN
Status: DISCONTINUED | OUTPATIENT
Start: 2024-05-20 | End: 2024-05-24

## 2024-05-20 RX ORDER — DIPHENHYDRAMINE HYDROCHLORIDE 50 MG/ML
50 INJECTION INTRAMUSCULAR; INTRAVENOUS ONCE
Status: COMPLETED | OUTPATIENT
Start: 2024-05-20 | End: 2024-05-20

## 2024-05-20 RX ADMIN — ROSUVASTATIN 10 MG: 10 TABLET, FILM COATED ORAL at 21:37

## 2024-05-20 RX ADMIN — ACETAMINOPHEN 650 MG: 325 TABLET ORAL at 10:00

## 2024-05-20 RX ADMIN — HYDROXYZINE PAMOATE 25 MG: 25 CAPSULE ORAL at 10:00

## 2024-05-20 RX ADMIN — SODIUM CHLORIDE, PRESERVATIVE FREE 10 ML: 5 INJECTION INTRAVENOUS at 10:00

## 2024-05-20 RX ADMIN — PANTOPRAZOLE SODIUM 40 MG: 40 TABLET, DELAYED RELEASE ORAL at 06:33

## 2024-05-20 RX ADMIN — FLUDROCORTISONE ACETATE 0.1 MG: 0.1 TABLET ORAL at 10:00

## 2024-05-20 RX ADMIN — HALOPERIDOL LACTATE 3 MG: 5 INJECTION, SOLUTION INTRAMUSCULAR at 18:26

## 2024-05-20 RX ADMIN — LEVOTHYROXINE SODIUM 100 MCG: 0.1 TABLET ORAL at 06:33

## 2024-05-20 RX ADMIN — CITALOPRAM HYDROBROMIDE 20 MG: 20 TABLET ORAL at 10:00

## 2024-05-20 RX ADMIN — DIPHENHYDRAMINE HYDROCHLORIDE 50 MG: 50 INJECTION, SOLUTION INTRAMUSCULAR; INTRAVENOUS at 05:11

## 2024-05-20 RX ADMIN — HALOPERIDOL LACTATE 2 MG: 5 INJECTION, SOLUTION INTRAMUSCULAR at 16:30

## 2024-05-20 RX ADMIN — MIDODRINE HYDROCHLORIDE 10 MG: 10 TABLET ORAL at 13:01

## 2024-05-20 RX ADMIN — CLOPIDOGREL BISULFATE 75 MG: 75 TABLET ORAL at 10:00

## 2024-05-20 RX ADMIN — INSULIN LISPRO 5 UNITS: 100 INJECTION, SOLUTION INTRAVENOUS; SUBCUTANEOUS at 16:09

## 2024-05-20 RX ADMIN — INSULIN GLARGINE 26 UNITS: 100 INJECTION, SOLUTION SUBCUTANEOUS at 09:59

## 2024-05-20 RX ADMIN — HYDROXYZINE PAMOATE 25 MG: 25 CAPSULE ORAL at 16:01

## 2024-05-20 RX ADMIN — FERROUS SULFATE TAB 325 MG (65 MG ELEMENTAL FE) 325 MG: 325 (65 FE) TAB at 10:00

## 2024-05-20 RX ADMIN — ASPIRIN 81 MG 81 MG: 81 TABLET ORAL at 10:00

## 2024-05-20 RX ADMIN — SODIUM CHLORIDE, PRESERVATIVE FREE 10 ML: 5 INJECTION INTRAVENOUS at 21:37

## 2024-05-20 RX ADMIN — DONEPEZIL HYDROCHLORIDE 10 MG: 10 TABLET, FILM COATED ORAL at 21:37

## 2024-05-20 RX ADMIN — METOPROLOL SUCCINATE 50 MG: 50 TABLET, EXTENDED RELEASE ORAL at 10:02

## 2024-05-20 RX ADMIN — MIDODRINE HYDROCHLORIDE 10 MG: 10 TABLET ORAL at 16:02

## 2024-05-20 RX ADMIN — EMPAGLIFLOZIN 10 MG: 10 TABLET, FILM COATED ORAL at 10:00

## 2024-05-20 RX ADMIN — MIDODRINE HYDROCHLORIDE 10 MG: 10 TABLET ORAL at 10:00

## 2024-05-20 ASSESSMENT — PAIN SCALES - GENERAL
PAINLEVEL_OUTOF10: 0
PAINLEVEL_OUTOF10: 0

## 2024-05-20 NOTE — PROGRESS NOTES
[facility]  INPATIENT OCCUPATIONAL THERAPY  STRZ MED SURG 8AB  EVALUATION    Time:   Time In: 736  Time Out: 08  Timed Code Treatment Minutes: 14 Minutes  Minutes: 26          Date: 2024  Patient Name: Harsha Jacobson,   Gender: male      MRN: [id number]  : 1940  (84 y.o.)  Referring Practitioner: Leandra Ward APRN - CNP  Diagnosis: elevated troponin  Additional Pertinent Hx: Patient is transferred to the emergency department from skilled nursing facility for complaint of chest pain.  Patient is really unable to provide any details regarding the chest pain due to dementia.  Patient's EKG is nonacute.  Troponins are found to be elevated.  Patient will be admitted on heparin drip and evaluated by cardiology.    Restrictions/Precautions:  Restrictions/Precautions: Fall Risk    Subjective  Chart Reviewed: Yes, Orders, Progress Notes, History and Physical  Patient assessed for rehabilitation services?: Yes  Family / Caregiver Present: No    Subjective: Pt supine in bed upon arrival, requesting to talk to wife and perseverating on this. Pt not easily redirectable, significantly confused, and initially refuses participation in therapy. Upon OTR departure, pt then attempting to climb out of bed with telesitter alarming. Pt then requesting to stand to \"stretch out his back.\"    Pain: Pt c/o generalized soreness,although unable to quantify.     Vitals: Nurse checked vitals prior to session    Social/Functional History:  Type of Home: Facility  Home Equipment: Walker - Rolling           ADL Assistance: Needs assistance  Ambulation Assistance: Needs assistance  Transfer Assistance: Needs assistance    Active : No     Additional Comments: Pt is very poor historian, follwoing each statement by \"I think, what do I know?\". Unsure of accuracy of above information or PLOF.    VISION:WFL    HEARING:  WFL    COGNITION: Slow Processing, Decreased Recall, Decreased Insight, Impaired Memory, Decreased  Problem Solving, Decreased Safety Awareness, Impaired Attention, Difficulty Following Commands, Impulsive, and Tangential    RANGE OF MOTION:  Bilateral Upper Extremity:  WFL    STRENGTH:  Bilateral Upper Extremity:  Impaired - grossly deconditioned through observation    ADL:   No ADL's completed this session..  **Pt refused all    IADL:   Not Tested    BALANCE:  Sitting Balance:  Stand By Assistance, Contact Guard Assistance.    Standing Balance: Minimal Assistance.      BED MOBILITY:  Supine to Sit: Minimal Assistance, with head of bed raised, with verbal cues , with increased time for completion   Sit to Supine: Minimal assistance; with HOB flat; increased time for completion   Scooting: Contact Guard Assistance, with increased time for completion ; advancing hips forward to EOB; Pt requires moderate assistance X2 to scoot up towards HOB    TRANSFERS:  Sit to Stand:  Minimal Assistance, with increased time for completion, cues for hand placement, with verbal cues.    Stand to Sit: Minimal Assistance, with increased time for completion, cues for hand placement, with verbal cues.    **Completed X2 trials    FUNCTIONAL MOBILITY:  Assistive Device: Rolling Walker  Assist Level:  Minimal Assistance, with verbal cues , and with increased time for completion.   Distance:  EOB<>bedside chair  Unsteady, quickly fatigues.    **Pt initially reported only wanted to stand then self initiated transitioning to bedside chair. After short time, pt then requested to return to bed due to wanting to sleep.      AM-Merged with [facility] Inpatient Daily Activity Raw Score: 14  AM-PAC Inpatient ADL T-Scale Score : 33.39  ADL Inpatient CMS 0-100% Score: 59.67    Activity Tolerance:  Patient tolerance of  treatment: Fair treatment tolerance and limited by cognition       Modified Boston Scale:  Not Applicable    Assessment:  Assessment: Pt presents requiring increased assistance for ADLs, transfers, and functional mobility compared to PLOF. Pt will

## 2024-05-20 NOTE — PROGRESS NOTES
Hospitalist Progress Note    Patient:  Harsha Jacobson      Unit/Bed:8A-15/015-A    YOB: 1940    MRN: [id number]       Acct: [id number]     PCP: Yo Thomas MD    Date of Admission: 5/15/2024    Assessment/Plan:    1.Elevated troponin in patient with known CAD:      Patient on Heparin drip     Serial troponin 34,41,34              Continue Rosuvastatin, ASA,Metoprolol, Clopidogrel      Patient was evaluated by cardiology recommended medical management appreciate input  ECHO showed: Left Ventricle: Normal left ventricular systolic function with a visually estimated EF of 55 - 60%. Not well visualized. Left ventricle size is normal. Normal wall thickness.    Mitral Valve: Not well visualized. Mildly thickened leaflet.    2. Coronary Artery Disease               Continue Rosuvastatin, ASA, Metoprolol,Clopidogrel                3. Hyperlipidemia              Continue Rosuvastatin     4. IDDM type II:   Continue long acting and short acting insulin coverage  Continue Jardiance  Continue hypoglycemic protocol   Monitor glucose   ADA diet               4.Thyroid disease           Patient on Levothyroxine               5. GERD continue Protonix               6. Cognitive impairment/dementia:                Continue Donepezil    patient with confusion.  Monitor cognition  Supportive care            7. Depression          Continue Citalopram     Anticipated Discharge in : TBD    Active Hospital Problems    Diagnosis Date Noted    Hyperlipidemia [E78.5] 05/17/2024    Elevated troponin [R79.89] 05/16/2024    Chest pain [R07.9] 05/16/2024    Arteriosclerosis of coronary artery [I25.10] 04/08/2022    Thyroid disease [E07.9] 04/08/2022    Type 2 diabetes mellitus without complication, with long-term current use of insulin (HCC) [E11.9, Z79.4] 12/03/2020             Chief Complaint:  chest pain       Hospital Course: HPI: Patient is transferred to the emergency department from skilled nursing facility for  rosuvastatin  10 mg Oral Nightly    levothyroxine  100 mcg Oral Daily     PRN Meds: sodium chloride flush, sodium chloride, potassium chloride **OR** potassium alternative oral replacement **OR** potassium chloride, magnesium sulfate, ondansetron **OR** ondansetron, polyethylene glycol, acetaminophen **OR** acetaminophen, glucose, dextrose bolus **OR** dextrose bolus, glucagon (rDNA), dextrose      Intake/Output Summary (Last 24 hours) at 5/20/2024 0955  Last data filed at 5/20/2024 0415  Gross per 24 hour   Intake 120 ml   Output 900 ml   Net -780 ml         Diet:  ADULT DIET; Regular    Exam:  /70   Pulse 95   Temp 97.5 °F (36.4 °C) (Oral)   Resp 18   Ht 1.702 m (5' 7\")   Wt 88 kg (193 lb 14.4 oz)   SpO2 99%   BMI 30.37 kg/m²     General appearance: Awake alert follows some  commands has confusion  No apparent distress, appears stated age and cooperative.  HEENT: Pupils equal, round, and reactive to light. Conjunctivae/corneas clear.  Neck: Supple, with full range of motion. No jugular venous distention. Trachea midline.  Respiratory:  Normal respiratory effort. Clear to auscultation, bilaterally without Rales/Wheezes/Rhonchi.  Cardiovascular: Regular rate and rhythm with normal S1/S2 without murmurs, rubs or gallops.  Abdomen: Soft, non-tender, non-distended with normal bowel sounds.  Musculoskeletal: passive and active ROM x 4 extremities.  Skin: Skin color, texture, turgor normal.  No rashes or lesions.  Neurologic:  alert oriented to self and place   Psychiatric:  Awake alert  oriented x1-2 follows simple commands, has confusion  thought content inappropriate  Capillary Refill: Brisk,< 3 seconds   Peripheral Pulses: +2 palpable, equal bilaterally       Labs:   Recent Labs     05/18/24  0619 05/19/24  0546 05/20/24  0500   WBC 9.2 8.0 7.0   HGB 13.9* 13.4* 13.4*   HCT 44.5 43.1 42.2    168 196       Recent Labs     05/18/24  0619 05/19/24  0546 05/20/24  0500    143 141   K 4.0 3.9 3.6

## 2024-05-20 NOTE — PLAN OF CARE
Problem: Discharge Planning  Goal: Discharge to home or other facility with appropriate resources  5/19/2024 1340 by Lizet Bahena RN  Outcome: Progressing     Problem: Safety - Adult  Goal: Free from fall injury  5/19/2024 2259 by Faustina Patel RN  Outcome: Progressing  5/19/2024 1340 by Lizet Bahena RN  Outcome: Progressing     Problem: ABCDS Injury Assessment  Goal: Absence of physical injury  5/19/2024 1340 by Lizet Bahena RN  Outcome: Progressing     Problem: Skin/Tissue Integrity  Goal: Absence of new skin breakdown  Description: 1.  Monitor for areas of redness and/or skin breakdown  2.  Assess vascular access sites hourly  3.  Every 4-6 hours minimum:  Change oxygen saturation probe site  4.  Every 4-6 hours:  If on nasal continuous positive airway pressure, respiratory therapy assess nares and determine need for appliance change or resting period.  5/19/2024 2259 by Faustina Patel RN  Outcome: Progressing  5/19/2024 1340 by Lizet Bahena RN  Outcome: Progressing     Problem: Pain  Goal: Verbalizes/displays adequate comfort level or baseline comfort level  5/19/2024 2259 by Faustina Patel RN  Outcome: Progressing  5/19/2024 1340 by Lizet Bahena RN  Outcome: Progressing     Problem: Chronic Conditions and Co-morbidities  Goal: Patient's chronic conditions and co-morbidity symptoms are monitored and maintained or improved  5/19/2024 1340 by Lizet Bahena RN  Outcome: Progressing

## 2024-05-21 LAB
ANION GAP SERPL CALC-SCNC: 12 MEQ/L (ref 8–16)
BASOPHILS ABSOLUTE: 0 THOU/MM3 (ref 0–0.1)
BASOPHILS NFR BLD AUTO: 0.7 %
BUN SERPL-MCNC: 18 MG/DL (ref 7–22)
CALCIUM SERPL-MCNC: 9 MG/DL (ref 8.5–10.5)
CHLORIDE SERPL-SCNC: 105 MEQ/L (ref 98–111)
CO2 SERPL-SCNC: 25 MEQ/L (ref 23–33)
CREAT SERPL-MCNC: 0.9 MG/DL (ref 0.4–1.2)
DEPRECATED RDW RBC AUTO: 47.7 FL (ref 35–45)
EOSINOPHIL NFR BLD AUTO: 4.1 %
EOSINOPHILS ABSOLUTE: 0.2 THOU/MM3 (ref 0–0.4)
ERYTHROCYTE [DISTWIDTH] IN BLOOD BY AUTOMATED COUNT: 14.2 % (ref 11.5–14.5)
GFR SERPL CREATININE-BSD FRML MDRD: 83 ML/MIN/1.73M2
GLUCOSE BLD STRIP.AUTO-MCNC: 102 MG/DL (ref 70–108)
GLUCOSE BLD STRIP.AUTO-MCNC: 108 MG/DL (ref 70–108)
GLUCOSE BLD STRIP.AUTO-MCNC: 138 MG/DL (ref 70–108)
GLUCOSE BLD STRIP.AUTO-MCNC: 147 MG/DL (ref 70–108)
GLUCOSE SERPL-MCNC: 100 MG/DL (ref 70–108)
HCT VFR BLD AUTO: 39.3 % (ref 42–52)
HGB BLD-MCNC: 12.7 GM/DL (ref 14–18)
IMM GRANULOCYTES # BLD AUTO: 0.03 THOU/MM3 (ref 0–0.07)
IMM GRANULOCYTES NFR BLD AUTO: 0.5 %
LYMPHOCYTES ABSOLUTE: 1.2 THOU/MM3 (ref 1–4.8)
LYMPHOCYTES NFR BLD AUTO: 21.1 %
MCH RBC QN AUTO: 29.5 PG (ref 26–33)
MCHC RBC AUTO-ENTMCNC: 32.3 GM/DL (ref 32.2–35.5)
MCV RBC AUTO: 91.2 FL (ref 80–94)
MONOCYTES ABSOLUTE: 0.8 THOU/MM3 (ref 0.4–1.3)
MONOCYTES NFR BLD AUTO: 12.8 %
NEUTROPHILS ABSOLUTE: 3.6 THOU/MM3 (ref 1.8–7.7)
NEUTROPHILS NFR BLD AUTO: 60.8 %
NRBC BLD AUTO-RTO: 0 /100 WBC
PLATELET # BLD AUTO: 201 THOU/MM3 (ref 130–400)
PMV BLD AUTO: 11.2 FL (ref 9.4–12.4)
POTASSIUM SERPL-SCNC: 3.6 MEQ/L (ref 3.5–5.2)
RBC # BLD AUTO: 4.31 MILL/MM3 (ref 4.7–6.1)
SODIUM SERPL-SCNC: 142 MEQ/L (ref 135–145)
WBC # BLD AUTO: 5.9 THOU/MM3 (ref 4.8–10.8)

## 2024-05-21 PROCEDURE — 99232 SBSQ HOSP IP/OBS MODERATE 35: CPT | Performed by: NURSE PRACTITIONER

## 2024-05-21 PROCEDURE — 2580000003 HC RX 258: Performed by: PHYSICIAN ASSISTANT

## 2024-05-21 PROCEDURE — 6370000000 HC RX 637 (ALT 250 FOR IP): Performed by: PHYSICIAN ASSISTANT

## 2024-05-21 PROCEDURE — 6370000000 HC RX 637 (ALT 250 FOR IP): Performed by: NURSE PRACTITIONER

## 2024-05-21 PROCEDURE — 85025 COMPLETE CBC W/AUTO DIFF WBC: CPT

## 2024-05-21 PROCEDURE — 1200000003 HC TELEMETRY R&B

## 2024-05-21 PROCEDURE — 82948 REAGENT STRIP/BLOOD GLUCOSE: CPT

## 2024-05-21 PROCEDURE — 36415 COLL VENOUS BLD VENIPUNCTURE: CPT

## 2024-05-21 PROCEDURE — 6360000002 HC RX W HCPCS: Performed by: NURSE PRACTITIONER

## 2024-05-21 PROCEDURE — 80048 BASIC METABOLIC PNL TOTAL CA: CPT

## 2024-05-21 PROCEDURE — 1200000000 HC SEMI PRIVATE

## 2024-05-21 RX ADMIN — ROSUVASTATIN 10 MG: 10 TABLET, FILM COATED ORAL at 20:32

## 2024-05-21 RX ADMIN — INSULIN LISPRO 5 UNITS: 100 INJECTION, SOLUTION INTRAVENOUS; SUBCUTANEOUS at 09:08

## 2024-05-21 RX ADMIN — SODIUM CHLORIDE, PRESERVATIVE FREE 10 ML: 5 INJECTION INTRAVENOUS at 09:07

## 2024-05-21 RX ADMIN — HALOPERIDOL LACTATE 5 MG: 5 INJECTION, SOLUTION INTRAMUSCULAR at 20:17

## 2024-05-21 RX ADMIN — PANTOPRAZOLE SODIUM 40 MG: 40 TABLET, DELAYED RELEASE ORAL at 09:11

## 2024-05-21 RX ADMIN — HYDROXYZINE PAMOATE 25 MG: 25 CAPSULE ORAL at 09:10

## 2024-05-21 RX ADMIN — FERROUS SULFATE TAB 325 MG (65 MG ELEMENTAL FE) 325 MG: 325 (65 FE) TAB at 09:08

## 2024-05-21 RX ADMIN — FLUDROCORTISONE ACETATE 0.1 MG: 0.1 TABLET ORAL at 09:08

## 2024-05-21 RX ADMIN — MIDODRINE HYDROCHLORIDE 10 MG: 10 TABLET ORAL at 12:46

## 2024-05-21 RX ADMIN — SODIUM CHLORIDE, PRESERVATIVE FREE 10 ML: 5 INJECTION INTRAVENOUS at 20:36

## 2024-05-21 RX ADMIN — CLOPIDOGREL BISULFATE 75 MG: 75 TABLET ORAL at 09:08

## 2024-05-21 RX ADMIN — EMPAGLIFLOZIN 10 MG: 10 TABLET, FILM COATED ORAL at 09:08

## 2024-05-21 RX ADMIN — METOPROLOL SUCCINATE 50 MG: 50 TABLET, EXTENDED RELEASE ORAL at 09:09

## 2024-05-21 RX ADMIN — HYDROXYZINE PAMOATE 25 MG: 25 CAPSULE ORAL at 15:15

## 2024-05-21 RX ADMIN — INSULIN GLARGINE 26 UNITS: 100 INJECTION, SOLUTION SUBCUTANEOUS at 09:09

## 2024-05-21 RX ADMIN — ASPIRIN 81 MG 81 MG: 81 TABLET ORAL at 09:08

## 2024-05-21 RX ADMIN — DONEPEZIL HYDROCHLORIDE 10 MG: 10 TABLET, FILM COATED ORAL at 20:32

## 2024-05-21 RX ADMIN — CITALOPRAM HYDROBROMIDE 20 MG: 20 TABLET ORAL at 09:08

## 2024-05-21 RX ADMIN — MIDODRINE HYDROCHLORIDE 10 MG: 10 TABLET ORAL at 17:46

## 2024-05-21 RX ADMIN — MIDODRINE HYDROCHLORIDE 10 MG: 10 TABLET ORAL at 09:09

## 2024-05-21 RX ADMIN — ACETAMINOPHEN 650 MG: 325 TABLET ORAL at 20:33

## 2024-05-21 RX ADMIN — LEVOTHYROXINE SODIUM 100 MCG: 0.1 TABLET ORAL at 09:11

## 2024-05-21 ASSESSMENT — PAIN - FUNCTIONAL ASSESSMENT: PAIN_FUNCTIONAL_ASSESSMENT: ACTIVITIES ARE NOT PREVENTED

## 2024-05-21 ASSESSMENT — PAIN DESCRIPTION - ORIENTATION: ORIENTATION: OTHER (COMMENT)

## 2024-05-21 ASSESSMENT — PAIN SCALES - GENERAL
PAINLEVEL_OUTOF10: 0
PAINLEVEL_OUTOF10: 2

## 2024-05-21 ASSESSMENT — PAIN DESCRIPTION - LOCATION: LOCATION: GENERALIZED

## 2024-05-21 ASSESSMENT — PAIN DESCRIPTION - DESCRIPTORS: DESCRIPTORS: ACHING

## 2024-05-21 NOTE — PROGRESS NOTES
Physician Progress Note      PATIENT:               MADDISON TORRES  CSN #:                  [id number]  :                       1940  ADMIT DATE:       5/15/2024 8:52 PM  DISCH DATE:  RESPONDING  PROVIDER #:        Leandra Ward CNP          QUERY TEXT:    Pt admitted with chest pain.  Pain noted to be reproducible with movement of   right arm and is described as sharp in nature. Pt also with hx of CAD and   GERD. If possible, please document in progress notes and discharge summary if   you are evaluating and/or treating any of the following:    The medical record reflects the following:    Risk Factors: muscular pain, GERD, CAD  Clinical Indicators: presents with chest pain and SOB, per cardiology PN Chest   pain is reproducible with movement of right arm and is described as sharp in   nature. When RT lateral chest armpit area) palpated - he screams ot \"ouch\",   Troponin high sensitivity 34, 41, 34  Treatment: Tylenol, ASA, Plavix, Metoprolol    Thank you!    Sergo Parra, JONATANN,RN, CRCR  RN Clinical   Options provided:  -- Chest pain due to costochondritis  -- Chest pain due to CAD with angina  -- Chest pain due to GERD  -- Chest pain due to (please specify), .  -- Other - I will add my own diagnosis  -- Disagree - Not applicable / Not valid  -- Disagree - Clinically unable to determine / Unknown  -- Refer to Clinical Documentation Reviewer    PROVIDER RESPONSE TEXT:    This patient has chest pain due to costochondritis.    Query created by: Sergo Parra on 2024 10:52 AM      Electronically signed by:  Leandra Ward CNP 2024 7:16 AM

## 2024-05-21 NOTE — PROGRESS NOTES
Hospitalist Progress Note    Patient:  Harsha Jacobson      Unit/Bed:8A-15/015-A    YOB: 1940    MRN: [id number]       Acct: [id number]     PCP: Yo Thomas MD    Date of Admission: 5/15/2024    Assessment/Plan:    1.Elevated troponin in patient with known CAD:      Patient on Heparin drip     Serial troponin 34,41,34              Continue Rosuvastatin, ASA,Metoprolol, Clopidogrel      Patient was evaluated by cardiology recommended medical management appreciate input  ECHO showed: Left Ventricle: Normal left ventricular systolic function with a visually estimated EF of 55 - 60%. Not well visualized. Left ventricle size is normal. Normal wall thickness.    Mitral Valve: Not well visualized. Mildly thickened leaflet.    2. Coronary Artery Disease               Continue Rosuvastatin, ASA, Metoprolol,Clopidogrel                3. Hyperlipidemia              Continue Rosuvastatin     4. IDDM type II:   Continue long acting and short acting insulin coverage  Continue Jardiance  Continue hypoglycemic protocol   Monitor glucose   ADA diet               4.Thyroid disease           Patient on Levothyroxine               5. GERD continue Protonix               6. Cognitive impairment/dementia:                Continue Donepezil    patient with confusion.  Monitor cognition  Supportive care            7. Depression          Continue Citalopram     8. Continued confusion and agitation  Patient requiring Haldol prn  Psych consulted to evaluate    Anticipated Discharge in : TBD    Active Hospital Problems    Diagnosis Date Noted    Hyperlipidemia [E78.5] 05/17/2024    Elevated troponin [R79.89] 05/16/2024    Chest pain [R07.9] 05/16/2024    Arteriosclerosis of coronary artery [I25.10] 04/08/2022    Thyroid disease [E07.9] 04/08/2022    Type 2 diabetes mellitus without complication, with long-term current use of insulin (HCC) [E11.9, Z79.4] 12/03/2020             Chief Complaint:  chest pain       Hospital  Sleepy follows simple commands, has confusion  thought content inappropriate  Capillary Refill: Brisk,< 3 seconds   Peripheral Pulses: +2 palpable, equal bilaterally       Labs:   Recent Labs     05/19/24  0546 05/20/24  0500 05/21/24  0622   WBC 8.0 7.0 5.9   HGB 13.4* 13.4* 12.7*   HCT 43.1 42.2 39.3*    196 201       Recent Labs     05/19/24  0546 05/20/24  0500 05/21/24  0622    141 142   K 3.9 3.6 3.6    104 105   CO2 23 23 25   BUN 24* 17 18   CREATININE 1.0 0.7 0.9   CALCIUM 8.9 9.2 9.0       No results for input(s): \"AST\", \"ALT\", \"BILIDIR\", \"BILITOT\", \"ALKPHOS\" in the last 72 hours.  No results for input(s): \"INR\" in the last 72 hours.    No results for input(s): \"CKTOTAL\", \"TROPONINI\" in the last 72 hours.    Urinalysis:      Lab Results   Component Value Date/Time    NITRU NEGATIVE 11/30/2020 07:50 PM    BACTERIA FEW 10/01/2011 03:05 AM    BLOODU NEGATIVE 11/30/2020 07:50 PM    GLUCOSEU >= 1000 11/30/2020 07:50 PM       Radiology:  XR CHEST PORTABLE   Final Result   No acute findings.      This document has been electronically signed by: Donaldo Saldivar MD on    05/15/2024 09:47 PM          Diet: ADULT DIET; Regular    DVT prophylaxis: [] Lovenox                                 [x] SCDs                                 [] SQ Heparin                                 [] Encourage ambulation           [] Already on Anticoagulation     Disposition:    [] Home       [] TCU       [] Rehab       [] Psych       [] SNF       [x] Long Term Care Facility       [] Other-    Code Status: Full Code    PT/OT Eval Status: yes        Electronically signed by DIOGO Acevedo CNP on 5/21/2024 at 8:40 AM

## 2024-05-21 NOTE — PROGRESS NOTES
[facility]   PROGRESS NOTE      Patient: Harsha Jacobson  Room #: 8A-15/015-A            YOB: 1940  Age: 84 y.o.  Gender: male            Admit Date & Time: 5/15/2024  8:52 PM    Assessment:    The patient was asleep at the time of this attempted visit. The patient's nurse shared the patient has dementia and would be running when he wakes up.     Interventions:  The patient was provided silent prayer.    Outcomes:  The patient remains sleeping at the end of the visit.     Plan:  1.Spiritual care will continue to follow the patient according to [facility] spiritual care SOP.       Electronically signed by Chaplain Joy, on 5/21/2024 at 3:52 PM.  Spiritual Care Department  [facility]  [phone]     05/21/24 1551   Encounter Summary   Encounter Overview/Reason Initial Encounter   Service Provided For Patient   Referral/Consult From [facility]   Support System Spouse   Last Encounter  05/21/24   Complexity of Encounter Low   Begin Time 1535   End Time  1540   Total Time Calculated 5 min   Spiritual/Emotional needs   Type Spiritual Support   Assessment/Intervention/Outcome   Assessment Unable to assess   Intervention Prayer (assurance of)/[place]   Outcome Did not respond

## 2024-05-21 NOTE — PLAN OF CARE
Problem: Discharge Planning  Goal: Discharge to home or other facility with appropriate resources  Outcome: Progressing     Problem: Safety - Adult  Goal: Free from fall injury  Outcome: Progressing     Problem: ABCDS Injury Assessment  Goal: Absence of physical injury  Outcome: Progressing     Problem: Skin/Tissue Integrity  Goal: Absence of new skin breakdown  Description: 1.  Monitor for areas of redness and/or skin breakdown  2.  Assess vascular access sites hourly  3.  Every 4-6 hours minimum:  Change oxygen saturation probe site  4.  Every 4-6 hours:  If on nasal continuous positive airway pressure, respiratory therapy assess nares and determine need for appliance change or resting period.  Outcome: Progressing     Problem: Pain  Goal: Verbalizes/displays adequate comfort level or baseline comfort level  Outcome: Progressing     Problem: Chronic Conditions and Co-morbidities  Goal: Patient's chronic conditions and co-morbidity symptoms are monitored and maintained or improved  Outcome: Progressing

## 2024-05-22 LAB
ALBUMIN SERPL BCG-MCNC: 3.2 G/DL (ref 3.5–5.1)
ALP SERPL-CCNC: 79 U/L (ref 38–126)
ALT SERPL W/O P-5'-P-CCNC: 17 U/L (ref 11–66)
AMMONIA PLAS-MCNC: < 10 UMOL/L (ref 11–60)
AST SERPL-CCNC: 23 U/L (ref 5–40)
BILIRUB CONJ SERPL-MCNC: < 0.2 MG/DL (ref 0–0.3)
BILIRUB SERPL-MCNC: 0.5 MG/DL (ref 0.3–1.2)
GLUCOSE BLD STRIP.AUTO-MCNC: 168 MG/DL (ref 70–108)
GLUCOSE BLD STRIP.AUTO-MCNC: 87 MG/DL (ref 70–108)
GLUCOSE BLD STRIP.AUTO-MCNC: 93 MG/DL (ref 70–108)
GLUCOSE BLD STRIP.AUTO-MCNC: 95 MG/DL (ref 70–108)
PROT SERPL-MCNC: 6.2 G/DL (ref 6.1–8)

## 2024-05-22 PROCEDURE — 6360000002 HC RX W HCPCS: Performed by: NURSE PRACTITIONER

## 2024-05-22 PROCEDURE — 6370000000 HC RX 637 (ALT 250 FOR IP): Performed by: PHYSICIAN ASSISTANT

## 2024-05-22 PROCEDURE — 2580000003 HC RX 258: Performed by: PHYSICIAN ASSISTANT

## 2024-05-22 PROCEDURE — 6370000000 HC RX 637 (ALT 250 FOR IP): Performed by: NURSE PRACTITIONER

## 2024-05-22 PROCEDURE — 90792 PSYCH DIAG EVAL W/MED SRVCS: CPT | Performed by: PSYCHIATRY & NEUROLOGY

## 2024-05-22 PROCEDURE — 99232 SBSQ HOSP IP/OBS MODERATE 35: CPT | Performed by: NURSE PRACTITIONER

## 2024-05-22 PROCEDURE — 1200000000 HC SEMI PRIVATE

## 2024-05-22 PROCEDURE — 80076 HEPATIC FUNCTION PANEL: CPT

## 2024-05-22 PROCEDURE — 82140 ASSAY OF AMMONIA: CPT

## 2024-05-22 PROCEDURE — 97110 THERAPEUTIC EXERCISES: CPT

## 2024-05-22 PROCEDURE — 97530 THERAPEUTIC ACTIVITIES: CPT

## 2024-05-22 PROCEDURE — 36415 COLL VENOUS BLD VENIPUNCTURE: CPT

## 2024-05-22 PROCEDURE — 82948 REAGENT STRIP/BLOOD GLUCOSE: CPT

## 2024-05-22 RX ADMIN — INSULIN GLARGINE 26 UNITS: 100 INJECTION, SOLUTION SUBCUTANEOUS at 08:03

## 2024-05-22 RX ADMIN — SODIUM CHLORIDE, PRESERVATIVE FREE 10 ML: 5 INJECTION INTRAVENOUS at 07:55

## 2024-05-22 RX ADMIN — HYDROXYZINE PAMOATE 25 MG: 25 CAPSULE ORAL at 14:08

## 2024-05-22 RX ADMIN — ROSUVASTATIN 10 MG: 10 TABLET, FILM COATED ORAL at 20:18

## 2024-05-22 RX ADMIN — CLOPIDOGREL BISULFATE 75 MG: 75 TABLET ORAL at 07:57

## 2024-05-22 RX ADMIN — HYDROXYZINE PAMOATE 25 MG: 25 CAPSULE ORAL at 08:02

## 2024-05-22 RX ADMIN — DONEPEZIL HYDROCHLORIDE 10 MG: 10 TABLET, FILM COATED ORAL at 20:16

## 2024-05-22 RX ADMIN — LEVOTHYROXINE SODIUM 100 MCG: 0.1 TABLET ORAL at 05:55

## 2024-05-22 RX ADMIN — HYDROXYZINE PAMOATE 25 MG: 25 CAPSULE ORAL at 20:15

## 2024-05-22 RX ADMIN — ASPIRIN 81 MG 81 MG: 81 TABLET ORAL at 07:56

## 2024-05-22 RX ADMIN — FLUDROCORTISONE ACETATE 0.1 MG: 0.1 TABLET ORAL at 07:57

## 2024-05-22 RX ADMIN — FERROUS SULFATE TAB 325 MG (65 MG ELEMENTAL FE) 325 MG: 325 (65 FE) TAB at 07:57

## 2024-05-22 RX ADMIN — MIDODRINE HYDROCHLORIDE 10 MG: 10 TABLET ORAL at 12:00

## 2024-05-22 RX ADMIN — MIDODRINE HYDROCHLORIDE 10 MG: 10 TABLET ORAL at 07:57

## 2024-05-22 RX ADMIN — HALOPERIDOL LACTATE 5 MG: 5 INJECTION, SOLUTION INTRAMUSCULAR at 08:48

## 2024-05-22 RX ADMIN — INSULIN LISPRO 5 UNITS: 100 INJECTION, SOLUTION INTRAVENOUS; SUBCUTANEOUS at 08:03

## 2024-05-22 RX ADMIN — MIDODRINE HYDROCHLORIDE 10 MG: 10 TABLET ORAL at 17:48

## 2024-05-22 RX ADMIN — CITALOPRAM HYDROBROMIDE 20 MG: 20 TABLET ORAL at 07:57

## 2024-05-22 RX ADMIN — PANTOPRAZOLE SODIUM 40 MG: 40 TABLET, DELAYED RELEASE ORAL at 05:55

## 2024-05-22 RX ADMIN — SODIUM CHLORIDE, PRESERVATIVE FREE 10 ML: 5 INJECTION INTRAVENOUS at 20:08

## 2024-05-22 RX ADMIN — METOPROLOL SUCCINATE 50 MG: 50 TABLET, EXTENDED RELEASE ORAL at 07:57

## 2024-05-22 RX ADMIN — EMPAGLIFLOZIN 10 MG: 10 TABLET, FILM COATED ORAL at 07:57

## 2024-05-22 NOTE — PROGRESS NOTES
[facility]  INPATIENT PHYSICAL THERAPY  DAILY NOTE  STRZ MED SURG 8AB - 8A-15/015-A    Time In: 0910  Time Out: 09  Timed Code Treatment Minutes: 41 Minutes  Minutes: 41          Date: 2024  Patient Name: Harsha Jacobson,  Gender:  male        MRN: [id number]  : 1940  (84 y.o.)     Referring Practitioner: Leandra Ward APRN - CNP  Diagnosis: Elevated troponin  Additional Pertinent Hx: From H&P:  Patient is transferred to the emergency department from skilled nursing facility for complaint of chest pain.  Patient is really unable to provide any details regarding the chest pain due to dementia.  Patient's EKG is nonacute.  Troponins are found to be elevated.  Patient will be admitted on heparin drip and evaluated by cardiology.     Prior Level of Function:  Type of Home: Facility  Home Equipment: Walker - Rolling        ADL Assistance: Needs assistance  Ambulation Assistance: Needs assistance  Transfer Assistance: Needs assistance  Active : No  Additional Comments: Pt is very poor historian, follwoing each statement by \"I think, what do I know?\". Unsure of accuracy of above information or PLOF.    Restrictions/Precautions:  Restrictions/Precautions: Fall Risk     SUBJECTIVE: Pt in bed upon arrival, and agrees to therapy, RN approved session, Pt A&O X self, Pt pleasant and cooperative, pt Lethargic/falling asleep, had tech and RN assist with pericare due to incontinence of bowel on 2 occasions.. extra time needed for clean up and whole bed change    PAIN: \"chest still hurts\"    Vitals: Vitals not assessed per clinical judgement, see nursing flowsheet  Vitals:    24 0714   BP: (!) 122/52   Pulse: 78   Resp: 16   Temp: 98.5 °F (36.9 °C)   SpO2: 94%     OBJECTIVE:  Bed Mobility:  Rolling to Left: Minimal Assistance, with rail, with verbal cues , with increased time for completion   Rolling to Right: Minimal Assistance, with head of bed flat, with rail, with verbal cues    Supine to

## 2024-05-22 NOTE — PROGRESS NOTES
Hospitalist Progress Note    Patient:  Harsha Jacobson      Unit/Bed:8A-15/015-A    YOB: 1940    MRN: [id number]       Acct: [id number]     PCP: Yo Thomas MD    Date of Admission: 5/15/2024    Assessment/Plan:    Atypical chest pain, resolved. Most probable due to costochondritis. Initially treated with IV Heparin which has been discontinued. Cardiology seen, recs for medical management. ECHO showed: Left Ventricle: Normal left ventricular systolic function with a visually estimated EF of 55 - 60%. Not well visualized. Left ventricle size is normal. Normal wall thickness. Mitral Valve: Not well visualized. Mildly thickened leaflet. Continue Rosuvastatin, ASA,Metoprolol, Clopidogrel   Elevated troponin. ACS ruled out.   Hospital acquired delirium. Hx of cognitive impairment / dementia. Psych consulted. SLP to do cog eval. Agitation, has requried Haldol.   Type 2 diabetes. Continue basal bolus regimen. SGLT2. Hypoglycemic protocol. Carb controlled diet.   GERD. PPI.  Depression. Continue Citalopram   HLP. Statin.   Obesity. BMI 30.37.      Chief Complaint: CP    Hospital Course:     Patient is transferred to the emergency department from skilled nursing facility for complaint of chest pain.  Patient is really unable to provide any details regarding the chest pain due to dementia.  Patient's EKG is nonacute.  Troponins are found to be elevated.  Patient will be admitted on heparin drip and evaluated by cardiology.         5/17 Patient was evaluated by cardiology recommended medical management appreciate input  ECHO showed: Left Ventricle: Normal left ventricular systolic function with a visually estimated EF of 55 - 60%. Not well visualized. Left ventricle size is normal. Normal wall thickness.    Mitral Valve: Not well visualized. Mildly thickened leaflet.     5/18 patient was resting on the bed was sleepy but woke up on command, patient to work with PT/OT.Sitter by bedside         5/19  patient was resting on the bed was awake alert oriented x2, no agitation noted  Off physical sitter, patient now has tele sitter     5/20 patient resting on the bed awake  alert with confusion  tele sitter in place  PT recommends return to current living arrangements  appreciate CS/SS input  Patient reported to be agitated last night requiring Haldol IM  Add Vistaril prn for restlessness      5/21 patient resting on the bed was sleepy but awakens  disoriented has confusion patient requiring Haldol prn, suspect some component of hospital delirium   Psych consulted to evaluate  Will continue to monitor   Sitter by bedside  Patient to go back to SNF when off sitter for 24 hrs     Subjective: Confused. Denies chest pain. Calling out frequently.       Medications:  Reviewed    Infusion Medications    sodium chloride      dextrose       Scheduled Medications    sodium chloride flush  5-40 mL IntraVENous 2 times per day    aspirin  81 mg Oral Daily    citalopram  20 mg Oral Daily    clopidogrel  75 mg Oral Daily    donepezil  10 mg Oral Nightly    ferrous sulfate  325 mg Oral Daily with breakfast    fludrocortisone  0.1 mg Oral Daily    insulin glargine  26 Units SubCUTAneous Daily    insulin lispro  5 Units SubCUTAneous BID AC    empagliflozin  10 mg Oral Daily    metoprolol succinate  50 mg Oral Daily    midodrine  10 mg Oral TID WC    pantoprazole  40 mg Oral Daily    rosuvastatin  10 mg Oral Nightly    levothyroxine  100 mcg Oral Daily     PRN Meds: hydrOXYzine pamoate, haloperidol lactate, sodium chloride flush, sodium chloride, potassium chloride **OR** potassium alternative oral replacement **OR** potassium chloride, magnesium sulfate, ondansetron **OR** ondansetron, polyethylene glycol, acetaminophen **OR** acetaminophen, glucose, dextrose bolus **OR** dextrose bolus, glucagon (rDNA), dextrose    No intake or output data in the 24 hours ending 05/22/24 1339    Diet:  ADULT DIET; Regular    Exam:  BP (!) 115/54

## 2024-05-22 NOTE — CARE COORDINATION
5/22/24, 7:47 AM EDT    DISCHARGE ON GOING EVALUATION    Harsha Jacobson       [facility] day: 6  Location: 8A-15/015-A Reason for admit: Elevated troponin [R79.89]  Chest pain, unspecified type [R07.9]     Procedures: none    Imaging since last note: no new    Barriers to Discharge: Hospitalist and therapy following. Psych consulted as patient has continued to have issues with confusion and agitation. Required IM Haldol last PM.     PCP: Yo Thomas MD  Readmission Risk Score: 15.3    Patient Goals/Plan/Treatment Preferences: Plan to return to [facility]. Will need precert. See SW notes.

## 2024-05-23 PROBLEM — R41.0 ACUTE DELIRIUM: Status: ACTIVE | Noted: 2024-05-23

## 2024-05-23 LAB
GLUCOSE BLD STRIP.AUTO-MCNC: 145 MG/DL (ref 70–108)
GLUCOSE BLD STRIP.AUTO-MCNC: 180 MG/DL (ref 70–108)
GLUCOSE BLD STRIP.AUTO-MCNC: 215 MG/DL (ref 70–108)
GLUCOSE BLD STRIP.AUTO-MCNC: 222 MG/DL (ref 70–108)

## 2024-05-23 PROCEDURE — 6370000000 HC RX 637 (ALT 250 FOR IP): Performed by: PHYSICIAN ASSISTANT

## 2024-05-23 PROCEDURE — 82948 REAGENT STRIP/BLOOD GLUCOSE: CPT

## 2024-05-23 PROCEDURE — 97530 THERAPEUTIC ACTIVITIES: CPT

## 2024-05-23 PROCEDURE — 92523 SPEECH SOUND LANG COMPREHEN: CPT

## 2024-05-23 PROCEDURE — 1200000000 HC SEMI PRIVATE

## 2024-05-23 PROCEDURE — 6370000000 HC RX 637 (ALT 250 FOR IP): Performed by: NURSE PRACTITIONER

## 2024-05-23 PROCEDURE — 99232 SBSQ HOSP IP/OBS MODERATE 35: CPT | Performed by: NURSE PRACTITIONER

## 2024-05-23 PROCEDURE — 97535 SELF CARE MNGMENT TRAINING: CPT

## 2024-05-23 PROCEDURE — 6360000002 HC RX W HCPCS: Performed by: NURSE PRACTITIONER

## 2024-05-23 PROCEDURE — 2580000003 HC RX 258: Performed by: PHYSICIAN ASSISTANT

## 2024-05-23 RX ADMIN — CITALOPRAM HYDROBROMIDE 20 MG: 20 TABLET ORAL at 09:21

## 2024-05-23 RX ADMIN — DONEPEZIL HYDROCHLORIDE 10 MG: 10 TABLET, FILM COATED ORAL at 20:26

## 2024-05-23 RX ADMIN — INSULIN GLARGINE 26 UNITS: 100 INJECTION, SOLUTION SUBCUTANEOUS at 09:22

## 2024-05-23 RX ADMIN — SODIUM CHLORIDE, PRESERVATIVE FREE 10 ML: 5 INJECTION INTRAVENOUS at 20:26

## 2024-05-23 RX ADMIN — INSULIN LISPRO 5 UNITS: 100 INJECTION, SOLUTION INTRAVENOUS; SUBCUTANEOUS at 17:29

## 2024-05-23 RX ADMIN — MIDODRINE HYDROCHLORIDE 10 MG: 10 TABLET ORAL at 13:03

## 2024-05-23 RX ADMIN — FERROUS SULFATE TAB 325 MG (65 MG ELEMENTAL FE) 325 MG: 325 (65 FE) TAB at 09:21

## 2024-05-23 RX ADMIN — CLOPIDOGREL BISULFATE 75 MG: 75 TABLET ORAL at 09:21

## 2024-05-23 RX ADMIN — ASPIRIN 81 MG 81 MG: 81 TABLET ORAL at 09:21

## 2024-05-23 RX ADMIN — SODIUM CHLORIDE, PRESERVATIVE FREE 10 ML: 5 INJECTION INTRAVENOUS at 09:22

## 2024-05-23 RX ADMIN — MIDODRINE HYDROCHLORIDE 10 MG: 10 TABLET ORAL at 09:21

## 2024-05-23 RX ADMIN — EMPAGLIFLOZIN 10 MG: 10 TABLET, FILM COATED ORAL at 09:21

## 2024-05-23 RX ADMIN — ROSUVASTATIN 10 MG: 10 TABLET, FILM COATED ORAL at 20:26

## 2024-05-23 RX ADMIN — INSULIN LISPRO 5 UNITS: 100 INJECTION, SOLUTION INTRAVENOUS; SUBCUTANEOUS at 09:21

## 2024-05-23 RX ADMIN — METOPROLOL SUCCINATE 50 MG: 50 TABLET, EXTENDED RELEASE ORAL at 09:23

## 2024-05-23 RX ADMIN — HALOPERIDOL LACTATE 5 MG: 5 INJECTION, SOLUTION INTRAMUSCULAR at 22:44

## 2024-05-23 RX ADMIN — MIDODRINE HYDROCHLORIDE 10 MG: 10 TABLET ORAL at 17:29

## 2024-05-23 RX ADMIN — LEVOTHYROXINE SODIUM 100 MCG: 0.1 TABLET ORAL at 06:12

## 2024-05-23 RX ADMIN — PANTOPRAZOLE SODIUM 40 MG: 40 TABLET, DELAYED RELEASE ORAL at 06:12

## 2024-05-23 RX ADMIN — HYDROXYZINE PAMOATE 25 MG: 25 CAPSULE ORAL at 21:37

## 2024-05-23 RX ADMIN — FLUDROCORTISONE ACETATE 0.1 MG: 0.1 TABLET ORAL at 09:21

## 2024-05-23 ASSESSMENT — PAIN SCALES - GENERAL: PAINLEVEL_OUTOF10: 0

## 2024-05-23 NOTE — PROGRESS NOTES
[facility]  INPATIENT PHYSICAL THERAPY  DAILY NOTE  STRZ MED SURG 8AB - 8A-15/015-A    Time In: 08  Time Out: 0845  Timed Code Treatment Minutes: 16 Minutes  Minutes: 16          Date: 2024  Patient Name: Harsha Jacobson,  Gender:  male        MRN: [id number]  : 1940  (84 y.o.)     Referring Practitioner: Leandra Ward APRN - CNP  Diagnosis: Elevated troponin  Additional Pertinent Hx: From H&P:  Patient is transferred to the emergency department from skilled nursing facility for complaint of chest pain.  Patient is really unable to provide any details regarding the chest pain due to dementia.  Patient's EKG is nonacute.  Troponins are found to be elevated.  Patient will be admitted on heparin drip and evaluated by cardiology.     Prior Level of Function:  Type of Home: Facility  Home Equipment: Walker - Rolling        ADL Assistance: Needs assistance  Ambulation Assistance: Needs assistance  Transfer Assistance: Needs assistance  Active : No  Additional Comments: Pt is very poor historian, follwoing each statement by \"I think, what do I know?\". Unsure of accuracy of above information or PLOF.    Restrictions/Precautions:  Restrictions/Precautions: Fall Risk     SUBJECTIVE: Pt in bed upon arrival, and agrees to therapy, RN approved session, pt cooperative for session    PAIN: back, did not elaborate or quantify    Vitals: Vitals not assessed per clinical judgement, see nursing flowsheet    OBJECTIVE:  Bed Mobility:  Rolling to Left: Moderate Assistance, with head of bed raised, with rail   Supine to Sit: Moderate Assistance, with head of bed raised, with rail  Scooting: Moderate Assistance  Extra time to complete, cues needed for technique step by step    Transfers:  Sit to Stand: Contact Guard Assistance, Minimal Assistance, X 2, cues for hand placement, with verbal cues  Stand to Sit:Contact Guard Assistance, Minimal Assistance, X 2, cues for hand placement, with verbal  cues    Ambulation:  Contact Guard Assistance, Minimal Assistance, X 2, with cues for safety, with verbal cues , with increased time for completion  Distance: 4ft  Surface: Level Tile  Device:Rolling Walker  Gait Deviations:  Forward Flexed Posture, Slow Liudmila, Decreased Step Length Bilaterally, Decreased Gait Speed, Decreased Heel Strike Bilaterally, Decreased Foot Clearance, Wide Base of Support, Unsteady Gait, and Increased Reliance on Rolling Walker    Balance:  Static Standing Balance: Stand By Assistance, Minimal Assistance, X 2, with verbal cues   Fluctuates in performance, pt demos trunk sway forward and retro. Stood ~2mins for pericare due to incontinence of urine    Functional Outcome Measures: Completed  AM-PAC Inpatient Mobility Raw Score : 13  AM-PAC Inpatient T-Scale Score : 36.74  Modified [place] Scale:  Not Applicable    ASSESSMENT:  Assessment: Patient progressing toward established goals. Pt demonstrates impairments with strength, balance, cognition/alertness, safety awareness, independence, requires hands on assistance for safety with mobility and would benefit from continued skilled PT improve these impairments, to prevent falls and ensure safety with mobility, pt will greatly benefit from continued skilled PT.    Activity Tolerance:  Patient tolerance of  treatment: good. Limited by cognition/alertness     Equipment Recommendations:Equipment Needed: No  Discharge Recommendations: Subacte/Skilled Nursing Facility / ECF with PT  Plan: Current Treatment Recommendations: Strengthening, ROM, Balance training, Functional mobility training, Transfer training, Gait training, Endurance training, Stair training, Therapeutic activities, Patient/Caregiver education & training, Safety education & training, Home exercise program  General Plan:  (3-5x/wk; GM)    Education  Education:  Learners: Patient  Patient Education: Plan of Care, Bed Mobility, Equipment Education, Transfers, Gait    Goals:     Short

## 2024-05-23 NOTE — PROGRESS NOTES
glucose, dextrose bolus **OR** dextrose bolus, glucagon (rDNA), dextrose      Intake/Output Summary (Last 24 hours) at 5/23/2024 1400  Last data filed at 5/22/2024 2008  Gross per 24 hour   Intake 10 ml   Output --   Net 10 ml       Diet:  ADULT DIET; Regular    Exam:  BP (!) 109/55   Pulse 88   Temp 98.4 °F (36.9 °C) (Oral)   Resp 18   Ht 1.702 m (5' 7\")   Wt 80.1 kg (176 lb 9.6 oz)   SpO2 91%   BMI 27.66 kg/m²     General appearance: No apparent distress, appears stated age and cooperative.  HEENT: Pupils equal, round, and reactive to light. Conjunctivae/corneas clear.  Neck: Supple, with full range of motion. No jugular venous distention. Trachea midline.  Respiratory:  Normal respiratory effort. Clear to auscultation, bilaterally without Rales/Wheezes/Rhonchi.  Cardiovascular: Regular rate and rhythm with normal S1/S2 without murmurs, rubs or gallops.  Abdomen: Soft, obese, non-tender, non-distended with normal bowel sounds.  Musculoskeletal: passive and active ROM x 4 extremities.  Skin: Skin color, texture, turgor normal.  No rashes or lesions.  Neurologic:  Neurovascularly intact without any focal sensory/motor deficits. Cranial nerves: II-XII intact, grossly non-focal.  Psychiatric: Alert and oriented to person only, thought content inappropriate, abnormal insight  Capillary Refill: Brisk,< 3 seconds   Peripheral Pulses: +2 palpable, equal bilaterally       Labs:   Recent Labs     05/21/24  0622   WBC 5.9   HGB 12.7*   HCT 39.3*          Recent Labs     05/21/24  0622      K 3.6      CO2 25   BUN 18   CREATININE 0.9   CALCIUM 9.0       Recent Labs     05/22/24  1359   AST 23   ALT 17   BILIDIR <0.2   BILITOT 0.5   ALKPHOS 79     No results for input(s): \"INR\" in the last 72 hours.  No results for input(s): \"CKTOTAL\", \"TROPONINI\" in the last 72 hours.    Urinalysis:      Lab Results   Component Value Date/Time    NITRU NEGATIVE 11/30/2020 07:50 PM    BACTERIA FEW 10/01/2011 03:05 AM     BLOODU NEGATIVE 11/30/2020 07:50 PM    GLUCOSEU >= 1000 11/30/2020 07:50 PM       Radiology:  XR CHEST PORTABLE   Final Result   No acute findings.      This document has been electronically signed by: Donaldo Saldivar MD on    05/15/2024 09:47 PM          Diet: ADULT DIET; Regular    DVT prophylaxis: [] Lovenox                                 [x] SCDs                                 [] SQ Heparin                                 [] Encourage ambulation           [] Already on Anticoagulation     Disposition:    [] Home       [] TCU       [] Rehab       [] Psych       [] SNF       [x] Long Term Care Facility       [] Other-    Code Status: Full Code    PT/OT Eval Status: consulted        Electronically signed by DIOGO Degroot CNP on 5/23/2024 at 2:00 PM

## 2024-05-23 NOTE — CONSULTS
Department of Psychiatry   Psychiatric Assessment      Thank you very much for allowing us to participate in the care of this patient.      Reason for Consult: Agitation    HISTORY OF PRESENT ILLNESS:    Patient is a 84-year-old male with history of dementia currently living in a nursing facility presented for chest pain.  Psychiatry is consulted for agitation and behavioral issues.  Patient is only oriented to self.  Not oriented to time place person or situation.  Reviewed notes and appears like patient has been somewhat agitated and has been receiving emergency medications.  Some concerns for fluctuating cognition.    PSYCHIATRIC HISTORY:  Patient is unable to provide any previous psychiatric history.    Prior to Admission medications    Medication Sig Start Date End Date Taking? Authorizing Provider   pantoprazole (PROTONIX) 40 MG tablet Take 1 tablet by mouth daily   Yes Provider, MD David   vitamin B-1 (THIAMINE) 100 MG tablet Take 1 tablet by mouth daily   Yes Provider, MD David   clopidogrel (PLAVIX) 75 MG tablet TAKE 1 TABLET BY MOUTH DAILY 12/29/23   Pretty Lozada APRN - CNP   rosuvastatin (CRESTOR) 10 MG tablet TAKE 1 TABLET BY MOUTH DAILY 12/21/23   Pretty Lozada APRN - CNP   citalopram (CELEXA) 20 MG tablet TAKE 1 TABLET BY MOUTH DAILY 12/21/23   Pretty Lozada APRN - CNP   fludrocortisone (FLORINEF) 0.1 MG tablet TAKE 1 TABLET BY MOUTH DAILY 10/27/23   Pretty Lozada APRN - CNP   midodrine (PROAMATINE) 10 MG tablet Take 1 tablet by mouth 3 times daily (with meals) 8/21/23   Pretty Lozada APRN - CNP   nitroGLYCERIN (NITROSTAT) 0.4 MG SL tablet Nitroglycerin Active 0.4 MG [id number] February 21st, 2021 10:58pm  Patient not taking: Reported on 5/16/2024 8/21/23   Pretty Lozada APRN - CNP   metoprolol succinate (TOPROL XL) 100 MG extended release tablet Take 1 tablet by mouth daily  Patient taking differently: Take 0.5 tablets by mouth daily  discontinuing any Atarax as it could worsen his mentation.  Recommend cutting down on the emergency medication Haldol dose from 5 mg to 2 mg  This could be his baseline.  At this time I do not think this will warrant a [facility] admission however if he continues to be extremely agitated will reassess the need to go to [facility].  Will follow as needed    Thank you very much for allowing us to participate in the care of this patient.      Electronically signed by Chinyere Orta MD on 5/22/24 at 2:12 PM EDT

## 2024-05-23 NOTE — PROGRESS NOTES
[facility]  SPEECH THERAPY  STRZ MED SURG 8AB  Speech - Language - Cognitive Evaluation    SLP Individual Minutes  Time In: 1429  Time Out: 1459  Minutes: 30  Timed Code Treatment Minutes: 0 Minutes       Date: 2024  Patient Name: Harsha Jacobson      CSN: [id number]   : 1940  (84 y.o.)  Gender: male   Referring Physician: Arelsi Gray APRN - CNP  Diagnosis: Elevated troponin   Precautions: Fall risk  History of Present Illness/Injury: Patient admitted with above diagnosis. Per chart review \" Patient is transferred to the emergency department from skilled nursing facility for complaint of chest pain.  Patient is really unable to provide any details regarding the chest pain due to dementia.  Patient's EKG is nonacute.  Troponins are found to be elevated.  Patient will be admitted on heparin drip and evaluated by cardiology. \"  Past Medical History:   Diagnosis Date    Arthritis     CAD (coronary artery disease)     Dementia (HCC)     Depression     Diabetes mellitus (HCC)     GERD (gastroesophageal reflux disease)     Hyperlipidemia     Thyroid disease        Pain: No pain reported.    Subjective:  Session approved by NAOMY Cummings. Per RN, patient recently transferred back in the bed, no family present. Patient pleasantly agreeable to complete speech, language, and cognitive assessment.     SOCIAL HISTORY:   Living Arrangements: Home alone, patient indicates he is living at home, however, documentation showed he is at SNF.   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     Rehabilitation Potential: good  Discharge Recommendations: SNF    EDUCATION:  Learner: Patient  Education:  Reviewed ST goals and Plan of Care and Reviewed recommendations for follow-up  Evaluation of Education: Verbalizes understanding    PLAN:  Skilled SLP intervention on acute care 3-5 x per week or until goals met and/or patient plateaus in function.  Specific interventions for next session may include: orientation, 1-3 units recall, call light, and naming.    PATIENT GOAL:    Did not state.  Will further assess during treatment.    SHORT TERM GOALS:  Short Term Goals  Time Frame for Short Term Goals: 2 week  Goal 1: The patient will complete functional recall task ( orientation, 1-3 units recall, biagraphical inforamation, ad call light) 75% accuracy with min cues to improve recall funcional information in ADLs.  Goal 2: The patient will complete thought organization with 75% with mod cues to improve processing speed and organization during ADLs completion.  Goal 3: The patient will complete reasoning, and problem solvig with 75% with mod cues to improve safety within environment.  Goal 4: The patient will complete expressive and receptive language tasks (naming, verbal fluency, 2-3 step directions) with 75% accuracy with mod cues to improve ablity to communication and comprehend in daily and medical information.    LONG TERM GOALS:  No long term goals recommended d/t aiden MONTGOMERY.     Keyanna MCPHERSON, Student Intern

## 2024-05-23 NOTE — CARE COORDINATION
5/23/24, 11:56 AM EDT    DISCHARGE PLANNING EVALUATION    Spoke with Marcela at [facility], waiting on OT to see patient with note then precert can be started. Lives sitter was pulled yesterday morning. Patient behavior has improved.  Psych saw yesterday, waiting on note.    12:41 PM  Spoke with Marcela at [facility], informed PT & OT notes are completed. She will start precert today 5/23.

## 2024-05-23 NOTE — PROGRESS NOTES
[facility]  STRZ MED SURG 8AB  Occupational Therapy  Daily Note  Time:   Time In: 1112  Time Out: 1141  Timed Code Treatment Minutes: 29 Minutes  Minutes: 29          Date: 2024  Patient Name: Harsha Jacobson,   Gender: male      Room: -15/015-A  MRN: [id number]  : 1940  (84 y.o.)  Referring Practitioner: Leandra Ward APRN - CNP  Diagnosis: elevated troponin  Additional Pertinent Hx: Patient is transferred to the emergency department from skilled nursing facility for complaint of chest pain.  Patient is really unable to provide any details regarding the chest pain due to dementia.  Patient's EKG is nonacute.  Troponins are found to be elevated.  Patient will be admitted on heparin drip and evaluated by cardiology.    Restrictions/Precautions:  Restrictions/Precautions: Fall Risk      Social/Functional History:  Type of Home: Facility  Home Equipment: Walker - Rolling           ADL Assistance: Needs assistance  Ambulation Assistance: Needs assistance  Transfer Assistance: Needs assistance    Active : No     Additional Comments: Pt is very poor historian, follwoing each statement by \"I think, what do I know?\". Unsure of accuracy of above information or PLOF.    SUBJECTIVE: Pt reclined in bedside chair, agreeable to OT session. Pt pleasant and cooperative throughout.     PAIN: 0/10:     Vitals: Nurse checked vitals prior to session    COGNITION: Slow Processing, Decreased Recall, Impaired Memory, Decreased Problem Solving, and Decreased Safety Awareness  **Pt oriented to person, place.     ADL:   Grooming: Stand By Assistance and with increased time for completion.  Washing/drying face, rinsing mouth while seated in chair  Bathing: Moderate Assistance, with verbal cues , and with increased time for completion.  . Assist for lower legs/feet and bottom/periarea. Pt required moderate cues for thoroughness with sponge bath and sequencing of task.   Upper Extremity Dressing: Minimal  Assistance.  Donning gown; assist to pull up to shoulders/tie  Footwear Management: Maximum Assistance.  socks .    IADL:   Not Tested    BALANCE:  Sitting Balance:  Stand By Assistance.    Standing Balance: Minimal Assistance, with verbal cues . ; cues for upright posture    BED MOBILITY:  Not Tested    TRANSFERS:  Sit to Stand:  Moderate Assistance, with increased time for completion, cues for hand placement, with verbal cues.    Stand to Sit: Minimal Assistance, with increased time for completion, cues for hand placement, with verbal cues.    **Completed X2 trials during ADL completion.     AM-[facility] Inpatient Daily Activity Raw Score: 14  AM-PAC Inpatient ADL T-Scale Score : 33.39  ADL Inpatient CMS 0-100% Score: 59.67    Modified [place] Scale:  Not Applicable    ASSESSMENT:     Activity Tolerance:  Patient tolerance of  treatment: Fair treatment tolerance, Reduced activity pace, and Need for increased rest breaks       Discharge Recommendations: ECF with OT  Equipment Recommendations: Equipment Needed: No  Plan: Times Per Week: 3-5x  Current Treatment Recommendations: Strengthening, Balance training, Functional mobility training, Endurance training, Safety education & training, Patient/Caregiver education & training, Equipment evaluation, education, & procurement, Cognitive reorientation, Self-Care / ADL    Education:  Learners: Patient  Plan of Care, ADL's, Orientation, and transfer training    Goals  Short Term Goals  Time Frame for Short Term Goals: by discharge  Short Term Goal 1: Pt will increase activity tolerance for functional mobility to/from BSC or chair, progressing to bathroom as able, with SBA in prep for ADL completion.  Short Term Goal 2: Pt will complete BADL tasks with minimal assistance and cuing prn for problem solving/sequencing to increase independence and ease with self care tasks.  Short Term Goal 3: Pt will complete functional transfers with SBA in prep for BSC/toilet transfers.  Long Term

## 2024-05-24 VITALS
DIASTOLIC BLOOD PRESSURE: 67 MMHG | RESPIRATION RATE: 16 BRPM | HEIGHT: 67 IN | WEIGHT: 176.6 LBS | HEART RATE: 98 BPM | SYSTOLIC BLOOD PRESSURE: 118 MMHG | BODY MASS INDEX: 27.72 KG/M2 | OXYGEN SATURATION: 90 % | TEMPERATURE: 98.1 F

## 2024-05-24 PROBLEM — E44.1 MILD MALNUTRITION (HCC): Status: ACTIVE | Noted: 2024-05-24

## 2024-05-24 LAB
GLUCOSE BLD STRIP.AUTO-MCNC: 116 MG/DL (ref 70–108)
GLUCOSE BLD STRIP.AUTO-MCNC: 196 MG/DL (ref 70–108)

## 2024-05-24 PROCEDURE — 6370000000 HC RX 637 (ALT 250 FOR IP): Performed by: PHYSICIAN ASSISTANT

## 2024-05-24 PROCEDURE — 99239 HOSP IP/OBS DSCHRG MGMT >30: CPT | Performed by: NURSE PRACTITIONER

## 2024-05-24 PROCEDURE — 6370000000 HC RX 637 (ALT 250 FOR IP): Performed by: NURSE PRACTITIONER

## 2024-05-24 PROCEDURE — 2580000003 HC RX 258: Performed by: PHYSICIAN ASSISTANT

## 2024-05-24 PROCEDURE — 82948 REAGENT STRIP/BLOOD GLUCOSE: CPT

## 2024-05-24 RX ORDER — METOPROLOL SUCCINATE 50 MG/1
50 TABLET, EXTENDED RELEASE ORAL DAILY
Qty: 30 TABLET | Refills: 0 | Status: SHIPPED | OUTPATIENT
Start: 2024-05-25

## 2024-05-24 RX ORDER — METOPROLOL SUCCINATE 50 MG/1
50 TABLET, EXTENDED RELEASE ORAL DAILY
Qty: 90 TABLET | OUTPATIENT
Start: 2024-05-24

## 2024-05-24 RX ORDER — RISPERIDONE 0.5 MG/1
0.5 TABLET ORAL 2 TIMES DAILY
Qty: 60 TABLET | Refills: 0 | Status: SHIPPED | OUTPATIENT
Start: 2024-05-24

## 2024-05-24 RX ORDER — RISPERIDONE 0.25 MG/1
0.5 TABLET ORAL 2 TIMES DAILY
Status: DISCONTINUED | OUTPATIENT
Start: 2024-05-24 | End: 2024-05-24 | Stop reason: HOSPADM

## 2024-05-24 RX ORDER — HALOPERIDOL 5 MG/ML
2 INJECTION INTRAMUSCULAR EVERY 6 HOURS PRN
Status: DISCONTINUED | OUTPATIENT
Start: 2024-05-24 | End: 2024-05-24 | Stop reason: HOSPADM

## 2024-05-24 RX ADMIN — MIDODRINE HYDROCHLORIDE 10 MG: 10 TABLET ORAL at 12:02

## 2024-05-24 RX ADMIN — METOPROLOL SUCCINATE 50 MG: 50 TABLET, EXTENDED RELEASE ORAL at 09:36

## 2024-05-24 RX ADMIN — ASPIRIN 81 MG 81 MG: 81 TABLET ORAL at 09:36

## 2024-05-24 RX ADMIN — EMPAGLIFLOZIN 10 MG: 10 TABLET, FILM COATED ORAL at 09:36

## 2024-05-24 RX ADMIN — PANTOPRAZOLE SODIUM 40 MG: 40 TABLET, DELAYED RELEASE ORAL at 06:16

## 2024-05-24 RX ADMIN — MIDODRINE HYDROCHLORIDE 10 MG: 10 TABLET ORAL at 09:35

## 2024-05-24 RX ADMIN — LEVOTHYROXINE SODIUM 100 MCG: 0.1 TABLET ORAL at 06:16

## 2024-05-24 RX ADMIN — CITALOPRAM HYDROBROMIDE 20 MG: 20 TABLET ORAL at 09:36

## 2024-05-24 RX ADMIN — RISPERIDONE 0.5 MG: 0.25 TABLET, FILM COATED ORAL at 12:02

## 2024-05-24 RX ADMIN — FLUDROCORTISONE ACETATE 0.1 MG: 0.1 TABLET ORAL at 09:36

## 2024-05-24 RX ADMIN — SODIUM CHLORIDE, PRESERVATIVE FREE 10 ML: 5 INJECTION INTRAVENOUS at 11:55

## 2024-05-24 RX ADMIN — CLOPIDOGREL BISULFATE 75 MG: 75 TABLET ORAL at 09:36

## 2024-05-24 RX ADMIN — FERROUS SULFATE TAB 325 MG (65 MG ELEMENTAL FE) 325 MG: 325 (65 FE) TAB at 09:36

## 2024-05-24 NOTE — PROGRESS NOTES
Physician Progress Note      PATIENT:               MADDISON TORRES  CSN #:                  [id number]  :                       1940  ADMIT DATE:       5/15/2024 8:52 PM  DISCH DATE:  RESPONDING  PROVIDER #:        Arelis Gray CNP          QUERY TEXT:    Patient admitted with chest pain due to costochondritis. Noted to have poor po   intake, weight loss, and dietician assessment with mild malnutrition   diagnosis. If possible, please document in progress notes and discharge   summary if you are evaluating and /or treating any of the following:    The medical record reflects the following:    Risk Factors: poor po intake, weight loss  Clinical Indicators: mild malnutrition per AND/ASPEN guidelines as evidenced   by Energy Intake:  75% or less of estimated energy requirements for 7 or more   days (per EMR), 9.3% weight loss in 9 months per EMR  Treatment: Dietician consult & Glucerna TID    ASPEN Criteria:    https://aspenjournals.onlinelibrary.sanchez.com/doi/full/10.1177/[email or web address]  5    Thank you!    Sergo Parra, JONATANN,RN, CRCR  RN Clinical   Options provided:  -- Mild protein calorie malnutrition  -- Other - I will add my own diagnosis  -- Disagree - Not applicable / Not valid  -- Disagree - Clinically unable to determine / Unknown  -- Refer to Clinical Documentation Reviewer    PROVIDER RESPONSE TEXT:    This patient has mild protein calorie malnutrition.    Query created by: Sergo Parra on 2024 11:52 AM      Electronically signed by:  Arelis Gray CNP 2024 12:08 PM

## 2024-05-24 NOTE — PLAN OF CARE
Care plan reviewed with patient. Patient  verbalize understanding of the plan of care and contribute to goal setting.     Problem: Discharge Planning  Goal: Discharge to home or other facility with appropriate resources  Outcome: Progressing     Problem: Safety - Adult  Goal: Free from fall injury  Outcome: Progressing     Problem: ABCDS Injury Assessment  Goal: Absence of physical injury  Outcome: Progressing     Problem: Skin/Tissue Integrity  Goal: Absence of new skin breakdown  Description: 1.  Monitor for areas of redness and/or skin breakdown  2.  Assess vascular access sites hourly  3.  Every 4-6 hours minimum:  Change oxygen saturation probe site  4.  Every 4-6 hours:  If on nasal continuous positive airway pressure, respiratory therapy assess nares and determine need for appliance change or resting period.  Outcome: Progressing     Problem: Pain  Goal: Verbalizes/displays adequate comfort level or baseline comfort level  Outcome: Progressing     Problem: Chronic Conditions and Co-morbidities  Goal: Patient's chronic conditions and co-morbidity symptoms are monitored and maintained or improved  Outcome: Progressing

## 2024-05-24 NOTE — PROGRESS NOTES
Attempted to call Luis Miguel albarran to give report. Was on hold arteaga over 10 minutes. Will attempt again when time allows.

## 2024-05-24 NOTE — PLAN OF CARE
Problem: Discharge Planning  Goal: Discharge to home or other facility with appropriate resources  5/24/2024 1049 by Renate Hugo RN  Outcome: Progressing     Problem: Safety - Adult  Goal: Free from fall injury  5/24/2024 1049 by Renate Hugo RN  Outcome: Progressing     Problem: ABCDS Injury Assessment  Goal: Absence of physical injury  5/24/2024 1049 by Renate Hugo RN  Outcome: Progressing     Problem: Skin/Tissue Integrity  Goal: Absence of new skin breakdown  Description: 1.  Monitor for areas of redness and/or skin breakdown  2.  Assess vascular access sites hourly  3.  Every 4-6 hours minimum:  Change oxygen saturation probe site  4.  Every 4-6 hours:  If on nasal continuous positive airway pressure, respiratory therapy assess nares and determine need for appliance change or resting period.  5/24/2024 1049 by Renate Hugo RN  Outcome: Progressing     Problem: Pain  Goal: Verbalizes/displays adequate comfort level or baseline comfort level  5/24/2024 1049 by Renate Hugo RN  Outcome: Progressing     Problem: Chronic Conditions and Co-morbidities  Goal: Patient's chronic conditions and co-morbidity symptoms are monitored and maintained or improved  5/24/2024 1049 by Renate Hugo RN  Outcome: Progressing     Problem: Nutrition Deficit:  Goal: Optimize nutritional status  5/24/2024 1049 by Renate Hugo RN  Outcome: Progressing   Care Plan reviewed with patient.  Patient verbalizes understanding to educator and is willing to contribute and participate in goal planning.

## 2024-05-24 NOTE — DISCHARGE SUMMARY
Hospital Medicine Discharge Summary      Patient Identification:   Harsha Jacobson   : 1940  MRN: [id number]   Account: [id number]      Patient's PCP: Yo Thomas MD    Admit Date: 5/15/2024     Discharge Date: 2024      Admitting Physician: Marcela Wyman, [facility]     Discharge Physician: Arelis Gray, APRN - CNP     Discharge Diagnoses and Hospital Course:    Presented from SNF with CP.    Atypical chest pain, resolved. Most probable due to costochondritis. Initially treated with IV Heparin which has been discontinued. Cardiology seen, recs for medical management. ECHO showed: Left Ventricle: Normal left ventricular systolic function with a visually estimated EF of 55 - 60%. Not well visualized. Left ventricle size is normal. Normal wall thickness. Mitral Valve: Not well visualized. Mildly thickened leaflet. Continue Rosuvastatin, ASA, Metoprolol, Clopidogrel   Mild protein calorie malnutrition. Supplements.   Elevated troponin. ACS ruled out.   Hospital acquired delirium, resolved. Mentation appears to be at baseline. Hx of cognitive impairment / dementia. Psych seen, recs for reduced PRN Haldol, Stop Atarax and start Risperdal 0.5 mg twice daily.    Type 2 diabetes. Continue basal bolus regimen. SGLT2. Hypoglycemic protocol. Carb controlled diet. Back to previous regimen on discharge.   GERD. PPI.  Depression. Continue Citalopram   HLP. Statin.   Obesity. BMI 30.37.  Physical deconditioning. PT/OT      CP resolved. Agitation improved. Mentation back to baseline. Stable for discharge back to SNF. Discharge was cleared by consultants.     The patient was seen and examined on day of discharge and this discharge summary is in conjunction with any daily progress note from day of discharge.        Exam:     Vitals:  Vitals:    24 1547 24 1945 24 0815 24 1057   BP: (!) 126/45 (!) 125/59 (!) 116/57 118/67   Pulse: 70 72 78 98   Resp: 18 18 16 16   Temp: 97.6 °F (36.4 °C)          Significant Diagnostic Studies    Radiology:   XR CHEST PORTABLE   Final Result   No acute findings.      This document has been electronically signed by: Donaldo Saldivar MD on    05/15/2024 09:47 PM             Consults:     IP CONSULT TO SOCIAL WORK  IP CONSULT TO CARDIOLOGY  IP CONSULT TO PSYCHIATRY    Disposition:    [x] Home       [] TCU       [] Rehab       [] Psych       [] SNF       [] Long Term Care Facility       [] Other-    Condition at Discharge: Stable    Code Status:  Full code    Patient Instructions:    Activity: activity as tolerated  Diet: Supplements      Follow-up visits:   [address] [address] [phone]             Discharge Medications:        Medication List        START taking these medications      risperiDONE 0.5 MG tablet  Commonly known as: RISPERDAL  Take 1 tablet by mouth 2 times daily            CHANGE how you take these medications      Jardiance 25 MG tablet  Generic drug: empagliflozin  What changed: Another medication with the same name was removed. Continue taking this medication, and follow the directions you see here.     metoprolol succinate 50 MG extended release tablet  Commonly known as: TOPROL XL  Take 1 tablet by mouth daily  What changed:   medication strength  how much to take     MULTIVITAMIN PO  What changed: Another medication with the same name was removed. Continue taking this medication, and follow the directions you see here.     Synthroid 100 MCG tablet  Generic drug: levothyroxine  What changed: Another medication with the same name was removed. Continue taking this medication, and follow the directions you see here.            CONTINUE taking these medications      acetaminophen 500 MG tablet  Commonly known as: TYLENOL     aspirin 81 MG tablet     * BD Pen Needle Cheryl 2nd Gen 32G X 4 MM Misc  Generic drug: Insulin Pen Needle     * B-D UF III MINI PEN NEEDLES 31G X 5 MM Misc  Generic drug: Insulin Pen

## 2024-05-24 NOTE — CARE COORDINATION
5/24/24, 11:32 AM EDT    DISCHARGE PLANNING EVALUATION    Marcela from [facility] left message, precert approved can accept today.    11:34 AM  Left Marcela message at [facility], plan discharge today with 4:00 pm transport. Provider wants to see patient prior to putting in discharge orders.  Will update. With any changes if needed.    Attempted to call patient wife Neva, no answer and voicemail is full.    2:13 PM  Attempted to call patient wife Neva, no answer and voicemail is full.    5/24/24, 2:32 PM EDT    Patient goals/plan/ treatment preferences discussed by  and .  Patient goals/plan/ treatment preferences reviewed with patient/ family.  Patient/ family verbalize understanding of discharge plan and are in agreement with goal/plan/treatment preferences.  Understanding was demonstrated using the teach back method.  AVS provided by RN at time of discharge, which includes all necessary medical information pertaining to the patients current course of illness, treatment, post-discharge goals of care, and treatment preferences.     Services At/After Discharge: Skilled Nursing Facility (SNF), Aide services, In ambulance, Nursing service, OT, and PT     Patient discharged to return to [facility] skilled medicare bed.  Marcela at [facility] aware of discharge and 4:00 transport.  Faxed AVS and MAR, RN aware to call report.  Unable to reach wife to inform of discharge, left message for Marcela at [facility] to inform wife.

## 2024-05-24 NOTE — PROGRESS NOTES
Comprehensive Nutrition Assessment    Type and Reason for Visit:  RD Nutrition Re-Screen/LOS    Nutrition Recommendations/Plan:   Consider a Multivitamin daily.  Started Glucerna TID.  Continue current diet.     Malnutrition Assessment:  Malnutrition Status:  Mild malnutrition (05/24/24 1005)    Context:  Acute Illness     Findings of the 6 clinical characteristics of malnutrition:  Energy Intake:  75% or less of estimated energy requirements for 7 or more days (per EMR; unable to wake patient this morning)  Weight Loss:   (-9.3% weight loss in 9 months per EMR)     Body Fat Loss:  No significant body fat loss     Muscle Mass Loss:  No significant muscle mass loss    Fluid Accumulation:  No significant fluid accumulation Extremities   Strength:  Not Performed    Nutrition Assessment: Pt. mildly malnourished AEB criteria as listed above. At risk for further nutrition compromise r/t admit d/t chest pain (resolved) 2/2 dementia, delirium (resolved), elevated troponin and underlying medical condition (Hx: CAD, HLD, Obesity, Dementia, Depression, DM, GERD, HLD).        Nutrition Related Findings:    Pt. Report/Treatments/Miscellaneous: Pt seen this morning- sleeping during visit and unable to wake with name was called loudly. RN unsure how patient has been eating. Per EMR, pt is consuming 50-75% of most meals. Will send ONS for patient to consume during LOS.  GI Status: Last BM x2 on 5/22.  Pertinent Labs: POC Glucose 116.  Pertinent Meds: Jardiance, Iron 325, Lantus, Humalog, Synthroid, Protonix.   Wound Type: None       Current Nutrition Intake & Therapies:    Average Meal Intake: 26-50%, 51-75%  Average Supplements Intake:  (Initiated today)  ADULT DIET; Regular  ADULT ORAL NUTRITION SUPPLEMENT; Breakfast, Lunch, Dinner; Diabetic Oral Supplement    Anthropometric Measures:  Height: 170.2 cm (5' 7\")  Ideal Body Weight (IBW): 148 lbs (67 kg)    Admission Body Weight: 88 kg (193 lb 14.4 oz) (5/16; no edema

## 2024-05-31 ENCOUNTER — OUTSIDE SERVICES (OUTPATIENT)
Dept: FAMILY MEDICINE CLINIC | Age: 84
End: 2024-05-31

## 2024-05-31 VITALS
DIASTOLIC BLOOD PRESSURE: 51 MMHG | OXYGEN SATURATION: 92 % | TEMPERATURE: 97.9 F | WEIGHT: 178.2 LBS | RESPIRATION RATE: 18 BRPM | HEART RATE: 104 BPM | SYSTOLIC BLOOD PRESSURE: 97 MMHG | BODY MASS INDEX: 27.91 KG/M2

## 2024-05-31 DIAGNOSIS — I10 PRIMARY HYPERTENSION: ICD-10-CM

## 2024-05-31 DIAGNOSIS — E44.1 MILD MALNUTRITION (HCC): ICD-10-CM

## 2024-05-31 DIAGNOSIS — R07.89 ATYPICAL CHEST PAIN: Primary | ICD-10-CM

## 2024-05-31 DIAGNOSIS — D64.9 ANEMIA, UNSPECIFIED TYPE: ICD-10-CM

## 2024-05-31 DIAGNOSIS — E03.9 HYPOTHYROIDISM, UNSPECIFIED TYPE: ICD-10-CM

## 2024-05-31 DIAGNOSIS — K21.00 GASTROESOPHAGEAL REFLUX DISEASE WITH ESOPHAGITIS WITHOUT HEMORRHAGE: ICD-10-CM

## 2024-05-31 DIAGNOSIS — I25.10 ASHD (ARTERIOSCLEROTIC HEART DISEASE): ICD-10-CM

## 2024-05-31 DIAGNOSIS — F03.90 DEMENTIA, UNSPECIFIED DEMENTIA SEVERITY, UNSPECIFIED DEMENTIA TYPE, UNSPECIFIED WHETHER BEHAVIORAL, PSYCHOTIC, OR MOOD DISTURBANCE OR ANXIETY (HCC): ICD-10-CM

## 2024-05-31 DIAGNOSIS — E11.9 TYPE 2 DIABETES MELLITUS WITHOUT COMPLICATION, WITH LONG-TERM CURRENT USE OF INSULIN (HCC): ICD-10-CM

## 2024-05-31 DIAGNOSIS — Z79.4 TYPE 2 DIABETES MELLITUS WITHOUT COMPLICATION, WITH LONG-TERM CURRENT USE OF INSULIN (HCC): ICD-10-CM

## 2024-05-31 DIAGNOSIS — E78.2 MIXED HYPERLIPIDEMIA: ICD-10-CM

## 2024-05-31 NOTE — PROGRESS NOTES
Harsha Jacobson is a 84 y.o. male who presents today for his medical conditions/complaints as noted below.   Chief Complaint   Patient presents with    Admission to [facility] 5/24/24       HPI:     Harsha Jacobson is an 85 yo male who was seen today for his admission to [facility] (admitted 5/26/24).  He has a h/o dementia, ASHD, HTN, T2DM, hypothyroidism, and GERD.  He was admitted here earlier this month with metabolic encephalopathy likely secondary to anemia.  On 5/15 he was sent to the ER with chest pain.  EKG showed no acute infarcts but his troponins were elevated so he was admitted and started on a Heparin drip.  He had an echocardiogram that showed normal LV SF and an EF of 55-60%.  He was continued on ASA, Plavis, Metoprolol, and statin.  Because of his dementia he is unable to care for himself and his wife is here with an ankle fracture so Harsha was admitted here for continued care.  He was evaluated by psychiatry who recommended Risperdal 0.5 mg BID.  Today he was seen at the nurses' station where he was pleasant but minimally conversant.  He did have a fall on 5/29 but denies any acute injuries as a result.      Past Medical History:   Diagnosis Date    Arthritis     CAD (coronary artery disease)     Dementia (HCC)     Depression     Diabetes mellitus (HCC)     GERD (gastroesophageal reflux disease)     Hyperlipidemia     Thyroid disease       Past Surgical History:   Procedure Laterality Date    BACK SURGERY  04/1991    lumbar laminectomy    CARDIAC CATHETERIZATION  12/2005    CARDIAC CATHETERIZATION  07/11/2018    CARDIOVASCULAR STRESS TEST  08/2015    CARPAL TUNNEL RELEASE Right 1970    CHOLECYSTECTOMY      COLONOSCOPY      2004, 2007, 2012,  2017    DEBRIDEMENT Left 12/2003    Ear    EYE SURGERY  2005    bilateral cataracts    IMPLANTATION VAGAL NERVE STIMULATOR N/A 12/7/2018    THORACIC LAMINECTOM PERMANENT SPINAL CORD STIMULATOR PADDLE AND BATTERY PLACEMENT performed by Rubio Felix MD

## 2024-06-09 ENCOUNTER — HOSPITAL ENCOUNTER (OUTPATIENT)
Age: 84
Setting detail: SPECIMEN
Discharge: HOME | End: 2024-06-09
Payer: MEDICARE

## 2024-06-09 LAB
BILIRUB UR QL STRIP: NEGATIVE
CLARITY UR: CLEAR
COLOR UR: YELLOW
EPI CELLS #/AREA URNS HPF: NORMAL /HPF (ref 0–5)
GLUCOSE UR STRIP-MCNC: ABNORMAL MG/DL
HGB UR QL STRIP.AUTO: NEGATIVE
KETONES UR STRIP-MCNC: NEGATIVE MG/DL
LEUKOCYTE ESTERASE UR QL STRIP: NEGATIVE
NITRITE UR QL STRIP: NEGATIVE
PH UR STRIP: 6 [PH] (ref 5–9)
PROT UR STRIP-MCNC: NEGATIVE MG/DL
RBC #/AREA URNS HPF: NORMAL /HPF (ref 0–2)
SP GR UR STRIP: 1.01 (ref 1.01–1.02)
UROBILINOGEN UR STRIP-ACNC: NORMAL EU/DL (ref 0–1)
WBC #/AREA URNS HPF: NORMAL /HPF (ref 0–5)

## 2024-06-09 PROCEDURE — 87086 URINE CULTURE/COLONY COUNT: CPT

## 2024-06-09 PROCEDURE — 81001 URINALYSIS AUTO W/SCOPE: CPT

## 2024-06-10 ENCOUNTER — HOSPITAL ENCOUNTER (OUTPATIENT)
Age: 84
Setting detail: SPECIMEN
Discharge: HOME | End: 2024-06-10

## 2024-06-10 LAB
ANION GAP SERPL CALCULATED.3IONS-SCNC: 10 MMOL/L (ref 9–17)
BUN SERPL-MCNC: 10 MG/DL (ref 8–23)
BUN/CREAT SERPL: 13 (ref 9–20)
CALCIUM SERPL-MCNC: 8.7 MG/DL (ref 8.6–10.4)
CHLORIDE SERPL-SCNC: 103 MMOL/L (ref 98–107)
CO2 SERPL-SCNC: 29 MMOL/L (ref 20–31)
CREAT SERPL-MCNC: 0.8 MG/DL (ref 0.7–1.2)
ERYTHROCYTE [DISTWIDTH] IN BLOOD BY AUTOMATED COUNT: 15 % (ref 11.8–14.4)
GFR, ESTIMATED: 86 ML/MIN/1.73M2
GLUCOSE SERPL-MCNC: 94 MG/DL (ref 70–99)
HCT VFR BLD AUTO: 38.4 % (ref 40.7–50.3)
HGB BLD-MCNC: 12.1 G/DL (ref 13–17)
MCH RBC QN AUTO: 29 PG (ref 25.2–33.5)
MCHC RBC AUTO-ENTMCNC: 31.5 G/DL (ref 28.4–34.8)
MCV RBC AUTO: 92.1 FL (ref 82.6–102.9)
NRBC BLD-RTO: 0 PER 100 WBC
PLATELET # BLD AUTO: 189 K/UL (ref 138–453)
PMV BLD AUTO: 11.3 FL (ref 8.1–13.5)
POTASSIUM SERPL-SCNC: 4.1 MMOL/L (ref 3.7–5.3)
RBC # BLD AUTO: 4.17 M/UL (ref 4.21–5.77)
SODIUM SERPL-SCNC: 142 MMOL/L (ref 135–144)
WBC OTHER # BLD: 6.2 K/UL (ref 3.5–11.3)

## 2024-06-10 PROCEDURE — 85027 COMPLETE CBC AUTOMATED: CPT

## 2024-06-10 PROCEDURE — 80048 BASIC METABOLIC PNL TOTAL CA: CPT

## 2024-06-10 PROCEDURE — 36415 COLL VENOUS BLD VENIPUNCTURE: CPT

## 2024-06-10 PROCEDURE — P9604 ONE-WAY ALLOW PRORATED TRIP: HCPCS

## 2024-06-11 LAB
MICROORGANISM SPEC CULT: NORMAL
SPECIMEN DESCRIPTION: NORMAL

## 2024-06-15 PROBLEM — R79.89 ELEVATED TROPONIN: Status: RESOLVED | Noted: 2024-05-16 | Resolved: 2024-06-15

## 2024-06-21 ENCOUNTER — HOSPITAL ENCOUNTER (OUTPATIENT)
Age: 84
Setting detail: SPECIMEN
Discharge: HOME | End: 2024-06-21

## 2024-06-21 LAB — VALPROATE SERPL-MCNC: 39 UG/ML (ref 50–125)

## 2024-06-21 PROCEDURE — 36415 COLL VENOUS BLD VENIPUNCTURE: CPT

## 2024-06-21 PROCEDURE — 80164 ASSAY DIPROPYLACETIC ACD TOT: CPT

## 2024-06-21 PROCEDURE — P9604 ONE-WAY ALLOW PRORATED TRIP: HCPCS

## 2024-06-30 ENCOUNTER — HOSPITAL ENCOUNTER (OUTPATIENT)
Age: 84
Setting detail: SPECIMEN
Discharge: HOME | End: 2024-06-30

## 2024-06-30 LAB
BACTERIA URNS QL MICRO: ABNORMAL
BILIRUB UR QL STRIP: NEGATIVE
CLARITY UR: CLEAR
COLOR UR: YELLOW
EPI CELLS #/AREA URNS HPF: ABNORMAL /HPF (ref 0–5)
GLUCOSE UR STRIP-MCNC: ABNORMAL MG/DL
HGB UR QL STRIP.AUTO: NEGATIVE
KETONES UR STRIP-MCNC: NEGATIVE MG/DL
LEUKOCYTE ESTERASE UR QL STRIP: NEGATIVE
NITRITE UR QL STRIP: NEGATIVE
PH UR STRIP: 5.5 [PH] (ref 5–9)
PROT UR STRIP-MCNC: NEGATIVE MG/DL
RBC #/AREA URNS HPF: ABNORMAL /HPF (ref 0–2)
SP GR UR STRIP: 1.01 (ref 1.01–1.02)
UROBILINOGEN UR STRIP-ACNC: NORMAL EU/DL (ref 0–1)
WBC #/AREA URNS HPF: ABNORMAL /HPF (ref 0–5)

## 2024-07-01 ENCOUNTER — HOSPITAL ENCOUNTER (OUTPATIENT)
Age: 84
Setting detail: SPECIMEN
Discharge: HOME | End: 2024-07-01

## 2024-07-01 LAB
MICROORGANISM SPEC CULT: NORMAL
SPECIMEN DESCRIPTION: NORMAL

## 2024-07-08 ENCOUNTER — HOSPITAL ENCOUNTER (OUTPATIENT)
Age: 84
Setting detail: SPECIMEN
Discharge: HOME | End: 2024-07-08

## 2024-07-08 LAB
ALBUMIN SERPL-MCNC: 2.9 G/DL (ref 3.5–5.2)
ALBUMIN/GLOB SERPL: 1.5 {RATIO} (ref 1–2.5)
ALP SERPL-CCNC: 67 U/L (ref 40–129)
ALT SERPL-CCNC: 14 U/L (ref 5–41)
ANION GAP SERPL CALCULATED.3IONS-SCNC: 6 MMOL/L (ref 9–17)
AST SERPL-CCNC: 19 U/L
BASOPHILS # BLD: 0.04 K/UL (ref 0–0.2)
BASOPHILS NFR BLD: 1 % (ref 0–2)
BILIRUB SERPL-MCNC: 0.4 MG/DL (ref 0.3–1.2)
BUN SERPL-MCNC: 10 MG/DL (ref 8–23)
BUN/CREAT SERPL: 11 (ref 9–20)
CALCIUM SERPL-MCNC: 8.2 MG/DL (ref 8.6–10.4)
CHLORIDE SERPL-SCNC: 105 MMOL/L (ref 98–107)
CO2 SERPL-SCNC: 28 MMOL/L (ref 20–31)
CREAT SERPL-MCNC: 0.9 MG/DL (ref 0.7–1.2)
EOSINOPHIL # BLD: 0.23 K/UL (ref 0–0.44)
EOSINOPHILS RELATIVE PERCENT: 5 % (ref 1–4)
ERYTHROCYTE [DISTWIDTH] IN BLOOD BY AUTOMATED COUNT: 16.4 % (ref 11.8–14.4)
GFR, ESTIMATED: 83 ML/MIN/1.73M2
GLUCOSE SERPL-MCNC: 103 MG/DL (ref 70–99)
HCT VFR BLD AUTO: 34.1 % (ref 40.7–50.3)
HGB BLD-MCNC: 11.1 G/DL (ref 13–17)
IMM GRANULOCYTES # BLD AUTO: 0.03 K/UL (ref 0–0.3)
IMM GRANULOCYTES NFR BLD: 1 %
LYMPHOCYTES NFR BLD: 1.37 K/UL (ref 1.1–3.7)
LYMPHOCYTES RELATIVE PERCENT: 27 % (ref 24–43)
MCH RBC QN AUTO: 29.8 PG (ref 25.2–33.5)
MCHC RBC AUTO-ENTMCNC: 32.6 G/DL (ref 28.4–34.8)
MCV RBC AUTO: 91.4 FL (ref 82.6–102.9)
MONOCYTES NFR BLD: 0.76 K/UL (ref 0.1–1.2)
MONOCYTES NFR BLD: 15 % (ref 3–12)
NEUTROPHILS NFR BLD: 51 % (ref 36–65)
NEUTS SEG NFR BLD: 2.73 K/UL (ref 1.5–8.1)
NRBC BLD-RTO: 0 PER 100 WBC
PLATELET # BLD AUTO: 165 K/UL (ref 138–453)
PMV BLD AUTO: 10.5 FL (ref 8.1–13.5)
POTASSIUM SERPL-SCNC: 4.1 MMOL/L (ref 3.7–5.3)
PROT SERPL-MCNC: 4.9 G/DL (ref 6.4–8.3)
RBC # BLD AUTO: 3.73 M/UL (ref 4.21–5.77)
SODIUM SERPL-SCNC: 139 MMOL/L (ref 135–144)
WBC OTHER # BLD: 5.2 K/UL (ref 3.5–11.3)

## 2024-07-08 PROCEDURE — 36415 COLL VENOUS BLD VENIPUNCTURE: CPT

## 2024-07-08 PROCEDURE — 80053 COMPREHEN METABOLIC PANEL: CPT

## 2024-07-08 PROCEDURE — 85025 COMPLETE CBC W/AUTO DIFF WBC: CPT

## 2024-07-14 ENCOUNTER — HOSPITAL ENCOUNTER (OUTPATIENT)
Age: 84
Setting detail: SPECIMEN
Discharge: HOME | End: 2024-07-14
Payer: MEDICARE

## 2024-07-14 LAB
C DIFF GDH + TOXINS A+B STL QL IA.RAPID: NEGATIVE
SPECIMEN DESCRIPTION: NORMAL

## 2024-07-14 PROCEDURE — 87324 CLOSTRIDIUM AG IA: CPT

## 2024-07-14 PROCEDURE — 87449 NOS EACH ORGANISM AG IA: CPT

## 2024-07-17 ENCOUNTER — HOSPITAL ENCOUNTER (EMERGENCY)
Age: 84
Discharge: NURSING, REHAB OR LONG-TERM CARE | End: 2024-07-17
Attending: EMERGENCY MEDICINE
Payer: MEDICARE

## 2024-07-17 ENCOUNTER — APPOINTMENT (OUTPATIENT)
Dept: CT IMAGING | Age: 84
End: 2024-07-17
Payer: MEDICARE

## 2024-07-17 ENCOUNTER — APPOINTMENT (OUTPATIENT)
Dept: GENERAL RADIOLOGY | Age: 84
End: 2024-07-17
Payer: MEDICARE

## 2024-07-17 VITALS
HEART RATE: 90 BPM | DIASTOLIC BLOOD PRESSURE: 68 MMHG | RESPIRATION RATE: 16 BRPM | SYSTOLIC BLOOD PRESSURE: 140 MMHG | OXYGEN SATURATION: 97 % | TEMPERATURE: 99.6 F

## 2024-07-17 DIAGNOSIS — R41.82 ALTERED MENTAL STATUS, UNSPECIFIED ALTERED MENTAL STATUS TYPE: Primary | ICD-10-CM

## 2024-07-17 LAB
ALBUMIN SERPL-MCNC: 3.6 G/DL (ref 3.5–5.2)
ALBUMIN/GLOB SERPL: 1.3 {RATIO} (ref 1–2.5)
ALP SERPL-CCNC: 75 U/L (ref 40–129)
ALT SERPL-CCNC: 19 U/L (ref 5–41)
ANION GAP SERPL CALCULATED.3IONS-SCNC: 13 MMOL/L (ref 9–17)
AST SERPL-CCNC: 25 U/L
BASOPHILS # BLD: 0.05 K/UL (ref 0–0.2)
BASOPHILS NFR BLD: 1 % (ref 0–2)
BILIRUB SERPL-MCNC: 0.8 MG/DL (ref 0.3–1.2)
BILIRUB UR QL STRIP: NEGATIVE
BUN SERPL-MCNC: 28 MG/DL (ref 8–23)
BUN/CREAT SERPL: 31 (ref 9–20)
CALCIUM SERPL-MCNC: 8.9 MG/DL (ref 8.6–10.4)
CHLORIDE SERPL-SCNC: 108 MMOL/L (ref 98–107)
CLARITY UR: CLEAR
CO2 SERPL-SCNC: 29 MMOL/L (ref 20–31)
COLOR UR: YELLOW
CREAT SERPL-MCNC: 0.9 MG/DL (ref 0.7–1.2)
EOSINOPHIL # BLD: <0.03 K/UL (ref 0–0.44)
EOSINOPHILS RELATIVE PERCENT: 0 % (ref 1–4)
EPI CELLS #/AREA URNS HPF: NORMAL /HPF (ref 0–5)
ERYTHROCYTE [DISTWIDTH] IN BLOOD BY AUTOMATED COUNT: 16.7 % (ref 11.8–14.4)
GFR, ESTIMATED: 83 ML/MIN/1.73M2
GLUCOSE SERPL-MCNC: 241 MG/DL (ref 70–99)
GLUCOSE UR STRIP-MCNC: ABNORMAL MG/DL
HCT VFR BLD AUTO: 43.3 % (ref 40.7–50.3)
HGB BLD-MCNC: 14.2 G/DL (ref 13–17)
HGB UR QL STRIP.AUTO: NEGATIVE
IMM GRANULOCYTES # BLD AUTO: 0.04 K/UL (ref 0–0.3)
IMM GRANULOCYTES NFR BLD: 1 %
KETONES UR STRIP-MCNC: ABNORMAL MG/DL
LACTATE BLDV-SCNC: 1.6 MMOL/L (ref 0.5–2.2)
LEUKOCYTE ESTERASE UR QL STRIP: NEGATIVE
LYMPHOCYTES NFR BLD: 1.42 K/UL (ref 1.1–3.7)
LYMPHOCYTES RELATIVE PERCENT: 17 % (ref 24–43)
MCH RBC QN AUTO: 30.2 PG (ref 25.2–33.5)
MCHC RBC AUTO-ENTMCNC: 32.8 G/DL (ref 28.4–34.8)
MCV RBC AUTO: 92.1 FL (ref 82.6–102.9)
MONOCYTES NFR BLD: 0.93 K/UL (ref 0.1–1.2)
MONOCYTES NFR BLD: 11 % (ref 3–12)
NEUTROPHILS NFR BLD: 71 % (ref 36–65)
NEUTS SEG NFR BLD: 6.06 K/UL (ref 1.5–8.1)
NITRITE UR QL STRIP: NEGATIVE
NRBC BLD-RTO: 0 PER 100 WBC
PH UR STRIP: 6 [PH] (ref 5–9)
PLATELET # BLD AUTO: 198 K/UL (ref 138–453)
PMV BLD AUTO: 10.9 FL (ref 8.1–13.5)
POTASSIUM SERPL-SCNC: 4.2 MMOL/L (ref 3.7–5.3)
PROT SERPL-MCNC: 6.3 G/DL (ref 6.4–8.3)
PROT UR STRIP-MCNC: NEGATIVE MG/DL
RBC # BLD AUTO: 4.7 M/UL (ref 4.21–5.77)
RBC #/AREA URNS HPF: NORMAL /HPF (ref 0–2)
SODIUM SERPL-SCNC: 150 MMOL/L (ref 135–144)
SP GR UR STRIP: 1.02 (ref 1.01–1.02)
UROBILINOGEN UR STRIP-ACNC: NORMAL EU/DL (ref 0–1)
WBC #/AREA URNS HPF: NORMAL /HPF (ref 0–5)
WBC OTHER # BLD: 8.5 K/UL (ref 3.5–11.3)

## 2024-07-17 PROCEDURE — 96360 HYDRATION IV INFUSION INIT: CPT

## 2024-07-17 PROCEDURE — 96361 HYDRATE IV INFUSION ADD-ON: CPT

## 2024-07-17 PROCEDURE — 85025 COMPLETE CBC W/AUTO DIFF WBC: CPT

## 2024-07-17 PROCEDURE — 80053 COMPREHEN METABOLIC PANEL: CPT

## 2024-07-17 PROCEDURE — 71045 X-RAY EXAM CHEST 1 VIEW: CPT

## 2024-07-17 PROCEDURE — 2580000003 HC RX 258: Performed by: EMERGENCY MEDICINE

## 2024-07-17 PROCEDURE — 99284 EMERGENCY DEPT VISIT MOD MDM: CPT

## 2024-07-17 PROCEDURE — 81001 URINALYSIS AUTO W/SCOPE: CPT

## 2024-07-17 PROCEDURE — 70450 CT HEAD/BRAIN W/O DYE: CPT

## 2024-07-17 PROCEDURE — 83605 ASSAY OF LACTIC ACID: CPT

## 2024-07-17 PROCEDURE — 87040 BLOOD CULTURE FOR BACTERIA: CPT

## 2024-07-17 RX ORDER — 0.9 % SODIUM CHLORIDE 0.9 %
1000 INTRAVENOUS SOLUTION INTRAVENOUS ONCE
Status: COMPLETED | OUTPATIENT
Start: 2024-07-17 | End: 2024-07-17

## 2024-07-17 RX ORDER — MEDROXYPROGESTERONE ACETATE 5 MG/1
5 TABLET ORAL DAILY
COMMUNITY

## 2024-07-17 RX ORDER — VALACYCLOVIR HYDROCHLORIDE 1 G/1
1000 TABLET, FILM COATED ORAL 2 TIMES DAILY
COMMUNITY

## 2024-07-17 RX ORDER — DIVALPROEX SODIUM 250 MG/1
250 TABLET, DELAYED RELEASE ORAL DAILY
COMMUNITY

## 2024-07-17 RX ORDER — TRAZODONE HYDROCHLORIDE 100 MG/1
100 TABLET ORAL NIGHTLY
COMMUNITY

## 2024-07-17 RX ORDER — OLANZAPINE 5 MG/1
5 TABLET ORAL 2 TIMES DAILY
COMMUNITY

## 2024-07-17 RX ORDER — 0.9 % SODIUM CHLORIDE 0.9 %
500 INTRAVENOUS SOLUTION INTRAVENOUS ONCE
Status: COMPLETED | OUTPATIENT
Start: 2024-07-17 | End: 2024-07-17

## 2024-07-17 RX ORDER — LORAZEPAM 1 MG/1
1 TABLET ORAL NIGHTLY PRN
COMMUNITY

## 2024-07-17 RX ORDER — BUSPIRONE HYDROCHLORIDE 5 MG/1
5 TABLET ORAL 3 TIMES DAILY
COMMUNITY

## 2024-07-17 RX ORDER — ATORVASTATIN CALCIUM 40 MG/1
40 TABLET, FILM COATED ORAL DAILY
COMMUNITY

## 2024-07-17 RX ORDER — BACLOFEN 10 MG/1
5 TABLET ORAL EVERY 8 HOURS PRN
COMMUNITY

## 2024-07-17 RX ORDER — VALPROIC ACID 250 MG/1
250 CAPSULE, LIQUID FILLED ORAL 2 TIMES DAILY
COMMUNITY

## 2024-07-17 RX ORDER — OMEPRAZOLE 20 MG/1
20 CAPSULE, DELAYED RELEASE ORAL 2 TIMES DAILY
COMMUNITY

## 2024-07-17 RX ADMIN — SODIUM CHLORIDE 1000 ML: 9 INJECTION, SOLUTION INTRAVENOUS at 03:32

## 2024-07-17 RX ADMIN — SODIUM CHLORIDE 500 ML: 9 INJECTION, SOLUTION INTRAVENOUS at 05:46

## 2024-07-17 NOTE — ED NOTES
Breakfast try delivered to patient. Patient was fed one bit of egg and he spit it out and said he does not want it. Patient to two drinks of orange juice without difficulty.

## 2024-07-17 NOTE — ED PROVIDER NOTES
HPI:  7/17/24,   Time: 3:05 AM EDT         Harsha Jacobson is a 84 y.o. male presenting to the ED for less responsive than usual, beginning last few days ago.  The complaint has been constant, moderate in severity, and worsened by nothing.  No known fever and no chest pain or difficulty breathing but history is limited due to the patient's altered mental status    ROS:   Pertinent positives and negatives are stated within HPI, all other systems reviewed and are negative.  --------------------------------------------- PAST HISTORY ---------------------------------------------  Past Medical History:  has a past medical history of Arthritis, CAD (coronary artery disease), Dementia (HCC), Depression, Diabetes mellitus (HCC), GERD (gastroesophageal reflux disease), Hyperlipidemia, and Thyroid disease.    Past Surgical History:  has a past surgical history that includes Cholecystectomy; Carpal tunnel release (Right, 1970); Tooth Extraction (1970); Rotator cuff repair (Left, 1998); Retinopathy surgery (Bilateral, 1997); Wound debridement (Left, 12/2003); cardiovascular stress test (08/2015); Colonoscopy; Upper gastrointestinal endoscopy (2012); Nerve Block Lumb Facet Level 1 Bilateral (Bilateral, 11/27/2017); Nerve Surgery (Bilateral, 02/06/2018); pr njx dx/ther agt pvrt facet jt lmbr/sac 1 level (Bilateral, 2/6/2018); other surgical history (Right, 03/05/2018); pr unlisted procedure nervous system (Right, 3/5/2018); other surgical history (Left, 03/27/2018); pr unlisted procedure nervous system (Left, 3/27/2018); eye surgery (2005); back surgery (04/1991); Cardiac catheterization (12/2005); Cardiac catheterization (07/11/2018); pr njx dx/ther sbst intrlmnr lmbr/sac w/img gdn (N/A, 7/19/2018); pr office/outpt visit,procedure only (N/A, 10/12/2018); and IMPLANTATION VAGAL NERVE STIMULATOR (N/A, 12/7/2018).    Social History:  reports that he has been smoking pipe and cigars. He has never used smokeless tobacco. He reports  INSTRUCTIONS TO FOLLOW AFTER SINUS SURGERY  DR. McCOUL - [facility]    Office hours:  Weekdays 8:00 am to 5:00 pm.  Please call [phone] and ask to speak with his nurse, Skyla.    After-hours & weekends:  Please call [phone] and ask to speak with the ENT resident doctor.    Your first office visit with Dr. Botello after surgery should have been already scheduled.  If you dont know when it is, call Dr. Marsh nurse Skyla at [phone].    Please call IMMMEDIATELY if you have:  - Temperature of 101° F or greater  - Any unusual, painful swelling  - Any active bleeding that saturates more than a 4x4 gauze  - Any thick drainage green or yellow drainage  - Changes in vision or swelling around the eye  - Pain not relieved by your prescribed pain medication    ACTIVITY:    Sleep on your back with the head of the bead elevated, up on 2-3 pillows, or in a recliner for the first 3 to 5 days. This will help with swelling.     After surgery you may have a lot of nasal drainage. This is normal. Do not wipe, touch, or dab your nose. You may breathe through your nose if youre able but avoid inhaling forcibly. Let all drainage fall on your mustache dressing and change it as needed.    You may wake up after surgery with thick white stockings on. Wear them until you are walking around more. It is important to walk around often while at home to keep your blood circulating and prevent blood clots.    If you use CPAP or BiPAP to sleep at night, you should wait at least 48 hours before resuming use.  Dr. Botello will advise you when it is safe to do this.    You may shower 24 hours after surgery.    RESTRICTED ACTIVITIES AFTER SURGERY:    Do NOT blow your nose for 2 weeks. If you have to sneeze or cough, do so with your mouth open.     AVOID all heavy lifting, straining or bending for 2 weeks.     AVOID any sexual activity for 2 weeks after surgery.    AVOID semi-contact sports or vigorous exercising for 3-4 weeks.   Rosmery will let you know when you are cleared to resume exercise.    AVOID flying or swimming for 2 weeks.      Do NOT operate a motor vehicle or any type of heavy machinery within 24 hours of taking pain medication.    DO NOT smoke or be around smokers.    AVOID irritating substances that might make you sneeze, such as dust, chalk, harsh chemicals, and allergic triggers.  This might also include spicy foods.    DRESSINGS:    Change the gauze mustache dressing under your nose as needed. (If unsure what this dressing is or how to do this, ask your doctor or nurse before you leave the hospital.) You may have pinkish-red drainage for 2-3 days.    Usually there is no gauze packing placed inside the nose.  If packing is necessary, you will be informed by your surgeon.  Do not touch or pull at the packing. The packing will be removed by your doctor at your first visit after surgery.     You may also have a dissolvable stent or dissolvable sponge placed into the sinuses during surgery.  These usually do not need to be removed unless you are told otherwise by Dr. Botello.  You may notice small fragments of these items come out of your nose in the weeks following surgery.    MEDICATIONS:    After surgery, you will be sent home with a prescription for pain medication and an anti-nausea medication.  Antibiotics are usually not necessary.    Most people need prescription pain medication for the first few days after surgery.  If you find that this is not necessary, you may use over-the-counter Tylenol (acetaminophen) instead.    Avoid Aspirin, ibuprofen, Motrin, Advil, Aleve and Vitamin E for 1 week before surgery and 1 week after surgery. There are many other medications to avoid as well due to the fact they act as blood thinners.      Some people have problems with bowel movements after surgery. If you have NOT had a bowel movement 3-5 days after surgery, go to your local pharmacy and purchase an over the counter stool softener  such as COLACE. You can also ask the pharmacist for his or her recommendation. If you still do not have a bowel movement after starting the softener, please call Dr. Marsh office.    You will need these over-the-counter medications after surgery:    SALINE SINUS RINSE (Arsenio Med brand):  You will use this to rinse out your nose and sinuses after surgery.  Begin doing this the day after surgery, unless instructed otherwise by Dr. Botello.  You should do this 2 times a day, following the instructions on the box.  AFRIN (regular strength): Only use if you have nasal congestion or bleeding. Use 2 times per day for 3 days, stop for 1 day, continue 2 times per day for 3 days, then stop completely.  NOTE:  You may not need to do this at all.    DIET:    Avoid hot and spicy foods for 1 week after surgery.    Begin with bland foods the evening after surgery and advance to your regular diet as tolerated.  It is not necessary to take only soft food unless you are recovering from tonsil surgery.    Drink plenty of fluids (water is best).     Avoid alcoholic and caffeinated beverages for 1 week after surgery because they can cause you to become dehydrated.

## 2024-07-18 ENCOUNTER — HOSPITAL ENCOUNTER (OUTPATIENT)
Age: 84
Setting detail: SPECIMEN
Discharge: HOME | End: 2024-07-18

## 2024-07-18 LAB
CHOLEST SERPL-MCNC: 107 MG/DL (ref 0–199)
CHOLESTEROL/HDL RATIO: 3
HDLC SERPL-MCNC: 43 MG/DL
LDLC SERPL CALC-MCNC: 38 MG/DL (ref 0–100)
TRIGL SERPL-MCNC: 134 MG/DL
VLDLC SERPL CALC-MCNC: 27 MG/DL

## 2024-07-18 PROCEDURE — 80061 LIPID PANEL: CPT

## 2024-07-18 PROCEDURE — 36415 COLL VENOUS BLD VENIPUNCTURE: CPT

## 2024-07-18 PROCEDURE — P9603 ONE-WAY ALLOW PRORATED MILES: HCPCS

## 2024-07-21 LAB
MICROORGANISM SPEC CULT: NORMAL
MICROORGANISM SPEC CULT: NORMAL
SERVICE CMNT-IMP: NORMAL
SERVICE CMNT-IMP: NORMAL
SPECIMEN DESCRIPTION: NORMAL
SPECIMEN DESCRIPTION: NORMAL

## 2024-07-22 DIAGNOSIS — R41.0 ACUTE DELIRIUM: Primary | ICD-10-CM

## 2024-07-22 RX ORDER — LORAZEPAM 2 MG/ML
INJECTION INTRAMUSCULAR
Qty: 4 ML | Refills: 0 | Status: SHIPPED | OUTPATIENT
Start: 2024-07-22 | End: 2024-07-23

## 2024-07-22 NOTE — PROGRESS NOTES
Hospice Certification of Terminal Illness      85 yo with terminal dx of cerebral atherosclerosis.  He has vascular dementia and is reported to have increased lethargy, nausea and behaviors with marked decline in function over months.  PPS is  20% with performance status markedly decreased to dependent mechanical lift transfer and total care from semi-independent and ambulatory 6 months ago.  Change in wt with current 168#, declined from 5/30 (about 7 weeks ago) at 178# and from his remote healthy weight of 189#.  Loss of about 10# over 7 weeks would extrapolate to over 10% of body weight in 6 months.  Comorbid Conditions include CAD, depression, hyperlipidemia, anxiety, dementia, arthritis  Tobacco use hx of pipe/cigars, GFR 83    DNRCC    I certify this pt has a life expectancy of 6 months or less if the disease follows it's normal expected course.    Erinn Kruse MD  Board Certified Hospice and Palliative Medicine  Hospice Medical Director Certified

## 2024-07-23 NOTE — TELEPHONE ENCOUNTER
OARRS reviewed. UDS: + for  Tramadol consistent. Narcan offered: yes  Last seen: 7/16/2020.  Follow-up:   Future Appointments   Date Time Provider Paul Beyer   10/21/2020  3:00 PM DIOGO Page - CNP [facility] - [facility] [[phone]]

## 2024-09-25 NOTE — CARE COORDINATION
Called report to MARNIE Garcia at [facility] at 1402. Pt has a 1500 pickup time.   DISASTER CHARTING    12/3/20, 2:30 PM EST    DISCHARGE ONGOING EVALUATION:     [facility] day: 3  Location: 4A-11/011-A Reason for admit: Syncope and collapse [R55]  Syncope and collapse [R55]   Barriers to Discharge: PT/OT, EEG done, Neurology following, IV fluids, diabetes management, Midodrine TID. PCP: Vonda Hilliard  Patient Goals/Plan/Treatment Preferences: Plan is to return home with wife, new HH. SW following.

## (undated) DEVICE — GLOVE ORANGE PI 7 1/2   MSG9075

## (undated) DEVICE — 35CM LONG TUNNELING TOOL

## (undated) DEVICE — O.R. CABLE 1X16, 61CM AND EXTENSION

## (undated) DEVICE — SHEET,DRAPE,3/4,53X77,STERILE: Brand: MEDLINE

## (undated) DEVICE — GLOVE ORANGE PI 8   MSG9080

## (undated) DEVICE — PROBE STIM 3 MM FOR PEDCL SCREW DISP

## (undated) DEVICE — TOWEL,OR,DSP,ST,BLUE,DLX,4/PK,20PK/CS: Brand: MEDLINE

## (undated) DEVICE — BLADE CLIPPER GEN PURP NS

## (undated) DEVICE — DIFFUSER: Brand: CORE, MAESTRO

## (undated) DEVICE — SYRINGE MED 10ML LUERLOCK TIP W/O SFTY DISP

## (undated) DEVICE — Device

## (undated) DEVICE — HYPODERMIC SAFETY NEEDLE: Brand: MAGELLAN

## (undated) DEVICE — SUTURE ABSRB L45CM L36MM SZ 2-0 OS-6 VLT POLYGLACTIN 910 J710T

## (undated) DEVICE — GAUZE,SPONGE,4"X4",12PLY,STERILE,LF,2'S: Brand: MEDLINE

## (undated) DEVICE — OIL CARTRIDGE: Brand: CORE, MAESTRO

## (undated) DEVICE — SOLUTION IV 1000ML LAC RINGERS PH 6.5 INJ USP VIAFLX PLAS

## (undated) DEVICE — YANKAUER,BULB TIP,W/O VENT,RIGID,STERILE: Brand: MEDLINE

## (undated) DEVICE — MICRO TIP WIPE: Brand: DEVON

## (undated) DEVICE — BANDAGE ADH W1XL3IN NAT FAB WVN FLX DURABLE N ADH PD SEAL

## (undated) DEVICE — SPONGE GZ W4XL4IN COT 12 PLY TYP VII WVN C FLD DSGN

## (undated) DEVICE — 3 ML SYRINGE LUER-LOCK TIP: Brand: MONOJECT

## (undated) DEVICE — 6 ML SYRINGE LUER-LOCK TIP: Brand: MONOJECT

## (undated) DEVICE — SUTURE VCRL SZ 0 L45CM ABSRB VLT OS-6 L36.4MM 1/2 CIR REV J711T

## (undated) DEVICE — 4-PORT MANIFOLD: Brand: NEPTUNE 2

## (undated) DEVICE — 1010 S-DRAPE TOWEL DRAPE 10/BX: Brand: STERI-DRAPE™

## (undated) DEVICE — PATIENT RETURN ELECTRODE, SINGLE-USE, CONTACT QUALITY MONITORING, ADULT, WITH 9FT CORD, FOR PATIENTS WEIGING OVER 33LBS. (15KG): Brand: MEGADYNE

## (undated) DEVICE — KIT CHARGING CHRG BASE STN PWR SUPL CHRG BELT PRECIS SPECTR

## (undated) DEVICE — NEEDLE SPNL 22GA L3.5IN BLK HUB S STL REG WALL FIT STYL W/

## (undated) DEVICE — GLOVE SURG SZ 65 THK91MIL LTX FREE SYN POLYISOPRENE

## (undated) DEVICE — GLOVE ORANGE PI 8 1/2   MSG9085

## (undated) DEVICE — CODMAN® SURGICAL PATTIES 1/2" X1 1/2" (1.27CM X 3.81CM): Brand: CODMAN®

## (undated) DEVICE — CARBIDE MATCH HEAD

## (undated) DEVICE — GOWN,SIRUS,NON REINFRCD,LARGE,SET IN SL: Brand: MEDLINE

## (undated) DEVICE — KIT PT TRL HANDHELD COMB THER WAVEFORM AUTOMATION FOR SPNL

## (undated) DEVICE — CONMED DISPOSABLE BIPOLAR CABLE, 10' (3.05M): Brand: CONMED

## (undated) DEVICE — NEEDLE SYR 18GA L1.5IN RED PLAS HUB S STL BLNT FILL W/O

## (undated) DEVICE — CABLE SURG L60CM EXTN NEUROMODULATION PRECIS SPECTR

## (undated) DEVICE — CODMAN® SURGICAL PATTIES 1/2" X 1/2" (1.27CM X 1.27CM): Brand: CODMAN®

## (undated) DEVICE — NEEDLE SPNL L3.5IN PNK HUB S STL REG WALL FIT STYL W/ QNCKE

## (undated) DEVICE — Z DISCONTINUED BY MEDLINE USE 2711682 TRAY SKIN PREP DRY W/ PREM GLV

## (undated) DEVICE — ROYAL SILK SURGICAL GOWN, XXL: Brand: CONVERTORS

## (undated) DEVICE — REMOTE CONTROL KIT: Brand: FREELINK™

## (undated) DEVICE — 3M™ STERI-STRIP™ COMPOUND BENZOIN TINCTURE 40 BAGS/CARTON 4 CARTONS/CASE C1544: Brand: 3M™ STERI-STRIP™

## (undated) DEVICE — 3M™ STERI-DRAPE™ INSTRUMENT POUCH 1018: Brand: STERI-DRAPE™

## (undated) DEVICE — BIPOLAR SEALER 23-112-1 AQM 6.0: Brand: AQUAMANTYS ®

## (undated) DEVICE — DRAPE C ARM W36XL30IN RECTANG BND BG AND TAPE

## (undated) DEVICE — GAUZE,SPONGE,8"X4",12PLY,XRAY,STRL,LF: Brand: MEDLINE

## (undated) DEVICE — SOLUTION IV 1000ML 0.9% SOD CHL PH 5 INJ USP VIAFLX PLAS

## (undated) DEVICE — CHLORAPREP 26ML CLEAR

## (undated) DEVICE — GOWN,SIRUS,NONRNF,SETINSLV,XL,20/CS: Brand: MEDLINE

## (undated) DEVICE — SOLUTION SURG PREP POV IOD 7.5% 4 OZ

## (undated) DEVICE — 35 ML SYRINGE LUER-LOCK TIP: Brand: MONOJECT

## (undated) DEVICE — SUTURE VCRL SZ 3-0 L18IN ABSRB VLT L26MM SH 1/2 CIR J774D

## (undated) DEVICE — INTENDED FOR TISSUE SEPARATION, AND OTHER PROCEDURES THAT REQUIRE A SHARP SURGICAL BLADE TO PUNCTURE OR CUT.: Brand: BARD-PARKER ® CARBON RIB-BACK BLADES

## (undated) DEVICE — SYRINGE IRRIG 60ML SFT PLIABLE BLB EZ TO GRP 1 HND USE W/

## (undated) DEVICE — WAX SURG 2.5GM HEMSTAT BNE BEESWAX PARAFFIN ISO PALMITATE

## (undated) DEVICE — WRENCH SURG L76CM SPNL CRD HEX STIM SYS SURG EQUIP PRECIS

## (undated) DEVICE — TOTAL TRAY, DB, 100% SILI FOLEY, 16FR 10: Brand: MEDLINE

## (undated) DEVICE — TUBING, SUCTION, 1/4" X 20', STRAIGHT: Brand: MEDLINE INDUSTRIES, INC.

## (undated) DEVICE — PREP SOL PVP IODINE 4%  4 OZ/BTL